# Patient Record
Sex: FEMALE | Race: BLACK OR AFRICAN AMERICAN | Employment: PART TIME | URBAN - METROPOLITAN AREA
[De-identification: names, ages, dates, MRNs, and addresses within clinical notes are randomized per-mention and may not be internally consistent; named-entity substitution may affect disease eponyms.]

---

## 2017-04-03 RX ORDER — METOPROLOL TARTRATE 50 MG/1
50 TABLET, FILM COATED ORAL 2 TIMES DAILY
COMMUNITY

## 2017-04-03 RX ORDER — TRAMADOL HYDROCHLORIDE 50 MG/1
50 TABLET ORAL EVERY 6 HOURS PRN
COMMUNITY
End: 2017-04-06

## 2017-04-06 ENCOUNTER — HOSPITAL ENCOUNTER (OUTPATIENT)
Dept: RADIATION ONCOLOGY | Age: 62
Discharge: HOME | End: 2017-04-06
Payer: COMMERCIAL

## 2017-04-06 VITALS
RESPIRATION RATE: 16 BRPM | WEIGHT: 229 LBS | HEART RATE: 74 BPM | TEMPERATURE: 97.1 F | SYSTOLIC BLOOD PRESSURE: 135 MMHG | BODY MASS INDEX: 38.15 KG/M2 | OXYGEN SATURATION: 98 % | DIASTOLIC BLOOD PRESSURE: 83 MMHG | HEIGHT: 65 IN

## 2017-04-06 PROCEDURE — 99212 OFFICE O/P EST SF 10 MIN: CPT | Performed by: RADIOLOGY

## 2018-04-26 ENCOUNTER — HOSPITAL ENCOUNTER (OUTPATIENT)
Dept: RADIATION ONCOLOGY | Age: 63
Discharge: HOME | End: 2018-04-26
Payer: COMMERCIAL

## 2018-04-26 VITALS
HEIGHT: 65 IN | DIASTOLIC BLOOD PRESSURE: 88 MMHG | SYSTOLIC BLOOD PRESSURE: 145 MMHG | WEIGHT: 233 LBS | TEMPERATURE: 97.9 F | HEART RATE: 80 BPM | OXYGEN SATURATION: 97 % | RESPIRATION RATE: 16 BRPM | BODY MASS INDEX: 38.82 KG/M2

## 2018-04-26 PROCEDURE — 99212 OFFICE O/P EST SF 10 MIN: CPT | Performed by: RADIOLOGY

## 2023-10-18 PROBLEM — M19.049 CMC ARTHRITIS: Status: ACTIVE | Noted: 2023-10-18

## 2023-10-18 RX ORDER — ONDANSETRON 4 MG/1
1 TABLET, ORALLY DISINTEGRATING ORAL EVERY 8 HOURS PRN
COMMUNITY
Start: 2021-11-21 | End: 2023-10-30 | Stop reason: ALTCHOICE

## 2023-10-18 RX ORDER — OXYCODONE AND ACETAMINOPHEN 5; 325 MG/1; MG/1
1 TABLET ORAL EVERY 6 HOURS PRN
COMMUNITY
Start: 2021-11-21 | End: 2023-10-24 | Stop reason: ALTCHOICE

## 2023-10-23 PROBLEM — E78.5 HYPERLIPIDEMIA: Status: ACTIVE | Noted: 2023-10-23

## 2023-10-23 PROBLEM — G47.00 INSOMNIA: Status: ACTIVE | Noted: 2023-10-23

## 2023-10-23 PROBLEM — I10 ESSENTIAL HYPERTENSION: Status: ACTIVE | Noted: 2023-10-23

## 2023-10-23 PROBLEM — F43.21 ADJUSTMENT DISORDER WITH DEPRESSED MOOD: Status: ACTIVE | Noted: 2023-10-23

## 2023-10-23 RX ORDER — EZETIMIBE 10 MG/1
10 TABLET ORAL DAILY
COMMUNITY
Start: 2023-08-06

## 2023-10-23 RX ORDER — FLUTICASONE PROPIONATE 50 MCG
SPRAY, SUSPENSION (ML) NASAL
COMMUNITY
Start: 2023-10-03

## 2023-10-23 RX ORDER — METFORMIN HYDROCHLORIDE 500 MG/1
500 TABLET ORAL
COMMUNITY
Start: 2023-10-03

## 2023-10-23 RX ORDER — DULOXETIN HYDROCHLORIDE 60 MG/1
60 CAPSULE, DELAYED RELEASE ORAL DAILY
COMMUNITY
Start: 2023-10-03

## 2023-10-23 RX ORDER — TRAZODONE HYDROCHLORIDE 50 MG/1
TABLET ORAL
COMMUNITY
Start: 2023-10-03

## 2023-10-23 RX ORDER — METOPROLOL TARTRATE 50 MG/1
50 TABLET ORAL
COMMUNITY
Start: 2023-10-03

## 2023-10-23 RX ORDER — NICOTINE 11MG/24HR
PATCH, TRANSDERMAL 24 HOURS TRANSDERMAL
COMMUNITY
Start: 2023-10-03

## 2023-10-24 ENCOUNTER — OFFICE VISIT (OUTPATIENT)
Dept: GASTROENTEROLOGY | Facility: CLINIC | Age: 68
End: 2023-10-24
Payer: MEDICARE

## 2023-10-24 VITALS
OXYGEN SATURATION: 95 % | BODY MASS INDEX: 40.45 KG/M2 | HEART RATE: 68 BPM | DIASTOLIC BLOOD PRESSURE: 86 MMHG | HEIGHT: 65 IN | SYSTOLIC BLOOD PRESSURE: 135 MMHG | RESPIRATION RATE: 18 BRPM | WEIGHT: 242.8 LBS

## 2023-10-24 DIAGNOSIS — Z12.11 COLON CANCER SCREENING: Primary | ICD-10-CM

## 2023-10-24 PROCEDURE — 3075F SYST BP GE 130 - 139MM HG: CPT | Performed by: NURSE PRACTITIONER

## 2023-10-24 PROCEDURE — 99202 OFFICE O/P NEW SF 15 MIN: CPT | Performed by: NURSE PRACTITIONER

## 2023-10-24 PROCEDURE — 1159F MED LIST DOCD IN RCRD: CPT | Performed by: NURSE PRACTITIONER

## 2023-10-24 PROCEDURE — 1160F RVW MEDS BY RX/DR IN RCRD: CPT | Performed by: NURSE PRACTITIONER

## 2023-10-24 PROCEDURE — 1036F TOBACCO NON-USER: CPT | Performed by: NURSE PRACTITIONER

## 2023-10-24 PROCEDURE — 3079F DIAST BP 80-89 MM HG: CPT | Performed by: NURSE PRACTITIONER

## 2023-10-24 NOTE — ASSESSMENT & PLAN NOTE
-Colonoscopy for CRC screening.  The procedure was discussed at length, including all possible risks.  Importance of the bowel prep was stressed to the patient for optimal results.  Sent to [facility] from [facility] to schedule a colonoscopy.  No red flag symptoms noted.  No increased risk factors.

## 2023-10-24 NOTE — PROGRESS NOTES
Gastroenterology Office Visit     History of Present Illness:   Myrna Calhoun is a 68 y.o. female who presents to GI clinic for colon cancer screening.  Patient presents to the GI clinic to schedule a screening colonoscopy.  No previous endoscopic investigations.  She denies any family history of colon cancer or IBD.  She denies bowel habit changes or change in stool caliber.  She has no complaints of constipation or diarrhea.  No melena or hematochezia.  Weight and appetite are stable.  She is a non-smoker and no NSAID use.  Occasional alcohol.  She denies GERD or dysphagia.  She denies any cardiac or pulmonary history.  She does not take any blood thinners or antiplatelets.    Last colonoscopy: N/A  Last endosopy:N/A    No history of abdominal surgeries    Review of Systems  Review of Systems   All other systems reviewed and are negative.      Past Medical History   has a past medical history of Breast cancer (CMS/HCC) (2019), Diabetes (CMS/[facility]), and Hypertension.     Past Surgical History  Past Surgical History:   Procedure Laterality Date    TOTAL KNEE ARTHROPLASTY Bilateral        Social History   reports that she has never smoked. She has never used smokeless tobacco. She reports that she does not currently use alcohol. She reports that she does not use drugs.     Family History  family history includes No Known Problems in her mother.   No family history of stomach or colon cancer    Allergies  Allergies   Allergen Reactions    Simvastatin Cough       Medications  Current Outpatient Medications   Medication Instructions    DULoxetine (CYMBALTA) 60 mg, oral, Daily    ezetimibe (ZETIA) 10 mg, oral, Daily    fluticasone (Flonase) 50 mcg/actuation nasal spray SPRAY 1 - 2 SPRAY BY INTRANASAL ROUTE EVERY DAY IN EACH NOSTRIL AS NEEDED    metFORMIN (GLUCOPHAGE) 500 mg, oral, 2 times daily with meals    metoprolol tartrate (LOPRESSOR) 50 mg, oral, 2 times daily with meals    ondansetron ODT (Zofran-ODT) 4 mg  disintegrating tablet 1 tablet, oral, Every 8 hours PRN    traZODone (Desyrel) 50 mg tablet TAKE 1-2 TABLETS BY ORAL ROUTE EVERY EVENING AS NEEDED    Vitamin D3 50 mcg (2,000 unit) capsule TAKE 1 CAPSULE BY ORAL ROUTE EVERY DAY WITH A MEAL (ABSORBED WITH FAT)        Objective   Visit Vitals  /86 (BP Location: Left arm, Patient Position: Sitting, BP Cuff Size: Large adult)   Pulse 68   Resp 18        Physical Exam  Vitals reviewed.   Constitutional:       General: She is awake. She is not in acute distress.     Appearance: Normal appearance. She is obese. She is not ill-appearing, toxic-appearing or diaphoretic.   HENT:      Head: Normocephalic and atraumatic.   Eyes:      Extraocular Movements: Extraocular movements intact.      Pupils: Pupils are equal, round, and reactive to light.   Cardiovascular:      Rate and Rhythm: Normal rate and regular rhythm.      Heart sounds: Normal heart sounds. No murmur heard.  Pulmonary:      Effort: Pulmonary effort is normal. No respiratory distress.      Breath sounds: Normal breath sounds.   Abdominal:      General: Abdomen is protuberant. Bowel sounds are normal.      Palpations: Abdomen is soft. There is no hepatomegaly.      Tenderness: There is no abdominal tenderness.   Musculoskeletal:         General: Normal range of motion.      Cervical back: Normal range of motion.      Right lower leg: No edema.      Left lower leg: No edema.   Skin:     General: Skin is warm and dry.      Coloration: Skin is not jaundiced or pale.   Neurological:      General: No focal deficit present.      Mental Status: She is alert and oriented to person, place, and time.      Motor: No weakness.      Gait: Gait normal.   Psychiatric:         Mood and Affect: Mood normal.         Behavior: Behavior normal. Behavior is cooperative.         Thought Content: Thought content normal.         Judgment: Judgment normal.       Assessment/Plan   Myrna Calhoun is a 68 y.o. female who presents to  GI clinic for    Colon cancer screening  -Colonoscopy for CRC screening.  The procedure was discussed at length, including all possible risks.  Importance of the bowel prep was stressed to the patient for optimal results.  Sent to [facility] from [facility] to schedule a colonoscopy.  No red flag symptoms noted.  No increased risk factors.       Anali Edouard, APRN-CNP

## 2023-10-30 ENCOUNTER — ANESTHESIA EVENT (OUTPATIENT)
Dept: GASTROENTEROLOGY | Facility: EXTERNAL LOCATION | Age: 68
End: 2023-10-30

## 2023-10-30 ENCOUNTER — HOSPITAL ENCOUNTER (OUTPATIENT)
Dept: GASTROENTEROLOGY | Facility: EXTERNAL LOCATION | Age: 68
Discharge: HOME | End: 2023-10-30
Payer: MEDICARE

## 2023-10-30 ENCOUNTER — ANESTHESIA (OUTPATIENT)
Dept: GASTROENTEROLOGY | Facility: EXTERNAL LOCATION | Age: 68
End: 2023-10-30

## 2023-10-30 VITALS
DIASTOLIC BLOOD PRESSURE: 89 MMHG | SYSTOLIC BLOOD PRESSURE: 134 MMHG | HEIGHT: 65 IN | HEART RATE: 90 BPM | WEIGHT: 242 LBS | BODY MASS INDEX: 40.32 KG/M2 | RESPIRATION RATE: 18 BRPM | TEMPERATURE: 97.3 F | OXYGEN SATURATION: 97 %

## 2023-10-30 DIAGNOSIS — Z12.11 COLON CANCER SCREENING: ICD-10-CM

## 2023-10-30 PROCEDURE — 88305 TISSUE EXAM BY PATHOLOGIST: CPT | Performed by: PATHOLOGY

## 2023-10-30 PROCEDURE — 88305 TISSUE EXAM BY PATHOLOGIST: CPT | Mod: TC | Performed by: STUDENT IN AN ORGANIZED HEALTH CARE EDUCATION/TRAINING PROGRAM

## 2023-10-30 PROCEDURE — 45385 COLONOSCOPY W/LESION REMOVAL: CPT | Performed by: STUDENT IN AN ORGANIZED HEALTH CARE EDUCATION/TRAINING PROGRAM

## 2023-10-30 PROCEDURE — 88305 TISSUE EXAM BY PATHOLOGIST: CPT | Mod: TC,SUR | Performed by: STUDENT IN AN ORGANIZED HEALTH CARE EDUCATION/TRAINING PROGRAM

## 2023-10-30 RX ORDER — LIDOCAINE HYDROCHLORIDE 20 MG/ML
INJECTION, SOLUTION INFILTRATION; PERINEURAL AS NEEDED
Status: DISCONTINUED | OUTPATIENT
Start: 2023-10-30 | End: 2023-10-30

## 2023-10-30 RX ORDER — PROPOFOL 10 MG/ML
INJECTION, EMULSION INTRAVENOUS AS NEEDED
Status: DISCONTINUED | OUTPATIENT
Start: 2023-10-30 | End: 2023-10-30

## 2023-10-30 RX ORDER — SODIUM CHLORIDE 9 MG/ML
20 INJECTION, SOLUTION INTRAVENOUS CONTINUOUS
Status: DISCONTINUED | OUTPATIENT
Start: 2023-10-30 | End: 2023-10-31 | Stop reason: HOSPADM

## 2023-10-30 RX ADMIN — PROPOFOL 70 MG: 10 INJECTION, EMULSION INTRAVENOUS at 11:42

## 2023-10-30 RX ADMIN — PROPOFOL 30 MG: 10 INJECTION, EMULSION INTRAVENOUS at 11:55

## 2023-10-30 RX ADMIN — PROPOFOL 30 MG: 10 INJECTION, EMULSION INTRAVENOUS at 11:58

## 2023-10-30 RX ADMIN — PROPOFOL 30 MG: 10 INJECTION, EMULSION INTRAVENOUS at 11:45

## 2023-10-30 RX ADMIN — LIDOCAINE HYDROCHLORIDE 3 ML: 20 INJECTION, SOLUTION INFILTRATION; PERINEURAL at 11:42

## 2023-10-30 RX ADMIN — PROPOFOL 30 MG: 10 INJECTION, EMULSION INTRAVENOUS at 11:49

## 2023-10-30 RX ADMIN — SODIUM CHLORIDE: 9 INJECTION, SOLUTION INTRAVENOUS at 11:38

## 2023-10-30 SDOH — HEALTH STABILITY: MENTAL HEALTH: CURRENT SMOKER: 0

## 2023-10-30 ASSESSMENT — PAIN SCALES - GENERAL
PAINLEVEL_OUTOF10: 0 - NO PAIN
PAIN_LEVEL: 0
PAINLEVEL_OUTOF10: 0 - NO PAIN

## 2023-10-30 ASSESSMENT — PAIN - FUNCTIONAL ASSESSMENT
PAIN_FUNCTIONAL_ASSESSMENT: 0-10

## 2023-10-30 ASSESSMENT — COLUMBIA-SUICIDE SEVERITY RATING SCALE - C-SSRS
1. IN THE PAST MONTH, HAVE YOU WISHED YOU WERE DEAD OR WISHED YOU COULD GO TO SLEEP AND NOT WAKE UP?: NO
6. HAVE YOU EVER DONE ANYTHING, STARTED TO DO ANYTHING, OR PREPARED TO DO ANYTHING TO END YOUR LIFE?: NO
2. HAVE YOU ACTUALLY HAD ANY THOUGHTS OF KILLING YOURSELF?: NO

## 2023-10-30 NOTE — ANESTHESIA PREPROCEDURE EVALUATION
Patient: Myrna Calhoun    Procedure Information       Date/Time: 10/30/23 1000    Scheduled providers: Oneal Valles DO    Procedure: COLONOSCOPY    Location: [facility]            Relevant Problems   Cardiovascular   (+) Essential hypertension   (+) Hyperlipidemia       Clinical information reviewed:   Tobacco  Allergies  Meds  Problems  Med Hx  Surg Hx   Fam Hx  Soc   Hx        NPO Detail:  NPO/Void Status  Date of Last Liquid: 10/30/23  Time of Last Liquid: 0400  Date of Last Solid: 10/28/23  Time of Last Solid: 1600  Last Intake Type: Clear fluids         Physical Exam    Airway  Mallampati: III     Cardiovascular - normal exam  Rhythm: regular  Rate: normal     Dental - normal exam     Pulmonary - normal exam  Breath sounds clear to auscultation     Abdominal   (+) obese  Abdomen: soft         Anesthesia Plan    ASA 3     MAC     The patient is not a current smoker.    intravenous induction   Anesthetic plan and risks discussed with patient.  Use of blood products discussed with who consented to blood products.    Plan discussed with CRNA.

## 2023-10-30 NOTE — ANESTHESIA POSTPROCEDURE EVALUATION
Patient: Myrna Calhoun    Procedure Summary       Date: 10/30/23 Room / Location: [facility]    Anesthesia Start: 1138 Anesthesia Stop: 1205    Procedure: COLONOSCOPY Diagnosis: Colon cancer screening    Scheduled Providers: Oneal Valles DO Responsible Provider: TATIANA Raza    Anesthesia Type: MAC ASA Status: 3            Anesthesia Type: MAC    Vitals Value Taken Time   /75 `   ` 36.3 10/30/23 1205   Pulse 94 10/30/23 1205   Resp 14 10/30/23 1205   SpO2 96 10/30/23 1205       Anesthesia Post Evaluation    Patient location during evaluation: bedside  Patient participation: complete - patient participated  Level of consciousness: awake  Pain score: 0  Pain management: adequate  Airway patency: patent  Cardiovascular status: acceptable  Respiratory status: acceptable  Hydration status: acceptable      There were no known notable events for this encounter.

## 2023-10-30 NOTE — SIGNIFICANT EVENT
Physician at bedside to discuss procedure results with patient  Family updated   Tolerating PO fluids

## 2023-10-30 NOTE — DISCHARGE INSTRUCTIONS
The anesthetics, sedatives and pain killers which were given to you will be acting in your body for the next 24 hours. This may cause you to feel sleepy. This feeling will slowly wear off. For the next 24 hours. Resume to normal activities tomorrow  FOR THE REST OF TODAY, DO NOT:  Drive a car  Operate machinery or power tools  Drink any form of alcohol, beer or wine.  Do not take any over the counter medications with alcohol in them or that can make you groggy  Make any important decisions or sign and legal documents.    **You may eat anything as long as your physician has not warned you to stay away from certain foods. However, it is better to start with liquids,  then progress to softer foods, and gradually work up to solid foods.    **We strongly suggest that a responsible adult be with you for the rest of the day and also the night. This is for your protection and safety since you may not be as alert as usual. You should be especially careful climbing stairs.     **If you experience bleeding in your spit or stool, fever and chills, shortness of breath, chest pain, or extreme abdominal pain, uncontrollable nausea and vomiting go to the nearest Emergency Room.    **Resume home medications unless otherwise instructed      [facility] Phone Number [phone]   [facility] [phone] If you need to speak with a doctor or it's after 5pm on a weekday or all weekend

## 2023-10-30 NOTE — H&P
Outpatient Hospital Procedure H&P    Patient Profile-Procedures  Name Myrna Calhoun  Date of Birth 1955  MRN [id number]  Address   [address] [id number] [address] [address]    Primary Phone Number [phone]  Secondary Phone Number    Vaolrie Valentino    Procedure(s):  Colonoscopy  Primary contact name and number   Extended Emergency Contact Information  Primary Emergency Contact: January Gray  Home Phone: [phone]  Relation: Sibling    General Health  Weight   Vitals:    10/30/23 1103   Weight: 110 kg (242 lb)     BMI Body mass index is 40.27 kg/m².    Allergies  Allergies   Allergen Reactions    Simvastatin Cough       Past Medical History   Past Medical History:   Diagnosis Date    Breast cancer (CMS/[facility]) 2019    Diabetes (CMS/[facility])     Hyperlipidemia     Hypertension        Provider assessment  Diagnosis: Colon cancer screening  Medication Reviewed - yes  Prior to Admission medications    Medication Sig Start Date End Date Taking? Authorizing Provider   DULoxetine (Cymbalta) 60 mg DR capsule Take 1 capsule (60 mg) by mouth once daily. 10/3/23   Historical Provider, MD   ezetimibe (Zetia) 10 mg tablet Take 1 tablet (10 mg) by mouth once daily. 8/6/23   Historical Provider, MD   fluticasone (Flonase) 50 mcg/actuation nasal spray SPRAY 1 - 2 SPRAY BY INTRANASAL ROUTE EVERY DAY IN EACH NOSTRIL AS NEEDED 10/3/23   Historical Provider, MD   metFORMIN (Glucophage) 500 mg tablet Take 1 tablet (500 mg) by mouth 2 times a day with meals. 10/3/23   Historical Provider, MD   metoprolol tartrate (Lopressor) 50 mg tablet Take 1 tablet by mouth 2 times a day with meals. 10/3/23   Historical Provider, MD   ondansetron ODT (Zofran-ODT) 4 mg disintegrating tablet Take 1 tablet (4 mg) by mouth every 8 hours if needed for nausea or vomiting. 11/21/21   Historical Provider, MD   traZODone (Desyrel) 50 mg tablet TAKE 1-2 TABLETS BY ORAL ROUTE EVERY EVENING AS NEEDED 10/3/23   Historical  Provider, MD   Vitamin D3 50 mcg (2,000 unit) capsule TAKE 1 CAPSULE BY ORAL ROUTE EVERY DAY WITH A MEAL (ABSORBED WITH FAT) 10/3/23   Historical Provider, MD   oxyCODONE-acetaminophen (Percocet) 5-325 mg tablet Take 1 tablet by mouth every 6 hours if needed for severe pain (7 - 10). 11/21/21 10/24/23  Historical Provider, MD       Physical Exam  Vitals:    10/30/23 1108   BP: (!) 169/96   Pulse: 90   Resp: 20   Temp:    SpO2: 96%       General: A&Ox3, NAD.  HEENT: AT/NC.   CV: RRR. No murmur.  Resp: CTA bilaterally. No wheezing, rhonchi or rales.   GI: Soft, NT/ND. BSx4.  Extrem: No edema. Pulses intact.  Skin: No Jaundice.   Neuro: No focal deficits.   Psych: Normal mood and affect.        Oropharyngeal Classification III (soft and hard palate and base of uvula visible)  ASA PS Classification 3  Sedation Plan: Deep Sedation.  Procedure Plan - pre-procedural (re)assesment completed by physician:  discharge/transfer patient when discharge criteria met    Oneal Valles DO  10/30/2023 11:38 AM

## 2023-11-10 LAB
LABORATORY COMMENT REPORT: NORMAL
PATH REPORT.FINAL DX SPEC: NORMAL
PATH REPORT.GROSS SPEC: NORMAL
PATH REPORT.TOTAL CANCER: NORMAL

## 2024-02-05 ENCOUNTER — HOSPITAL ENCOUNTER (EMERGENCY)
Facility: HOSPITAL | Age: 69
Discharge: HOME | End: 2024-02-06
Attending: STUDENT IN AN ORGANIZED HEALTH CARE EDUCATION/TRAINING PROGRAM
Payer: MEDICARE

## 2024-02-05 DIAGNOSIS — R10.2 SUPRAPUBIC ABDOMINAL PAIN: Primary | ICD-10-CM

## 2024-02-05 DIAGNOSIS — R30.0 DYSURIA: ICD-10-CM

## 2024-02-05 LAB
ALBUMIN SERPL BCP-MCNC: 4.2 G/DL (ref 3.4–5)
ALP SERPL-CCNC: 55 U/L (ref 33–136)
ALT SERPL W P-5'-P-CCNC: 17 U/L (ref 7–45)
ANION GAP SERPL CALC-SCNC: 11 MMOL/L (ref 10–20)
AST SERPL W P-5'-P-CCNC: 18 U/L (ref 9–39)
BASOPHILS # BLD AUTO: 0.02 X10*3/UL (ref 0–0.1)
BASOPHILS NFR BLD AUTO: 0.4 %
BILIRUB SERPL-MCNC: 0.5 MG/DL (ref 0–1.2)
BUN SERPL-MCNC: 24 MG/DL (ref 6–23)
CALCIUM SERPL-MCNC: 9.5 MG/DL (ref 8.6–10.3)
CARDIAC TROPONIN I PNL SERPL HS: 7 NG/L (ref 0–13)
CHLORIDE SERPL-SCNC: 103 MMOL/L (ref 98–107)
CO2 SERPL-SCNC: 27 MMOL/L (ref 21–32)
CREAT SERPL-MCNC: 1.33 MG/DL (ref 0.5–1.05)
EGFRCR SERPLBLD CKD-EPI 2021: 44 ML/MIN/1.73M*2
EOSINOPHIL # BLD AUTO: 0.06 X10*3/UL (ref 0–0.7)
EOSINOPHIL NFR BLD AUTO: 1.1 %
ERYTHROCYTE [DISTWIDTH] IN BLOOD BY AUTOMATED COUNT: 13.4 % (ref 11.5–14.5)
GLUCOSE SERPL-MCNC: 95 MG/DL (ref 74–99)
HCT VFR BLD AUTO: 43.2 % (ref 36–46)
HGB BLD-MCNC: 14.3 G/DL (ref 12–16)
IMM GRANULOCYTES # BLD AUTO: 0.01 X10*3/UL (ref 0–0.7)
IMM GRANULOCYTES NFR BLD AUTO: 0.2 % (ref 0–0.9)
LACTATE SERPL-SCNC: 2.5 MMOL/L (ref 0.4–2)
LIPASE SERPL-CCNC: 27 U/L (ref 9–82)
LYMPHOCYTES # BLD AUTO: 2.48 X10*3/UL (ref 1.2–4.8)
LYMPHOCYTES NFR BLD AUTO: 46.4 %
MCH RBC QN AUTO: 32.3 PG (ref 26–34)
MCHC RBC AUTO-ENTMCNC: 33.1 G/DL (ref 32–36)
MCV RBC AUTO: 98 FL (ref 80–100)
MONOCYTES # BLD AUTO: 0.57 X10*3/UL (ref 0.1–1)
MONOCYTES NFR BLD AUTO: 10.7 %
NEUTROPHILS # BLD AUTO: 2.21 X10*3/UL (ref 1.2–7.7)
NEUTROPHILS NFR BLD AUTO: 41.2 %
NRBC BLD-RTO: 0 /100 WBCS (ref 0–0)
PLATELET # BLD AUTO: 342 X10*3/UL (ref 150–450)
POTASSIUM SERPL-SCNC: 4.1 MMOL/L (ref 3.5–5.3)
PROT SERPL-MCNC: 7.5 G/DL (ref 6.4–8.2)
RBC # BLD AUTO: 4.43 X10*6/UL (ref 4–5.2)
SODIUM SERPL-SCNC: 137 MMOL/L (ref 136–145)
WBC # BLD AUTO: 5.4 X10*3/UL (ref 4.4–11.3)

## 2024-02-05 PROCEDURE — 80053 COMPREHEN METABOLIC PANEL: CPT | Performed by: STUDENT IN AN ORGANIZED HEALTH CARE EDUCATION/TRAINING PROGRAM

## 2024-02-05 PROCEDURE — 99284 EMERGENCY DEPT VISIT MOD MDM: CPT | Mod: 25 | Performed by: STUDENT IN AN ORGANIZED HEALTH CARE EDUCATION/TRAINING PROGRAM

## 2024-02-05 PROCEDURE — 2500000004 HC RX 250 GENERAL PHARMACY W/ HCPCS (ALT 636 FOR OP/ED): Performed by: STUDENT IN AN ORGANIZED HEALTH CARE EDUCATION/TRAINING PROGRAM

## 2024-02-05 PROCEDURE — 96375 TX/PRO/DX INJ NEW DRUG ADDON: CPT

## 2024-02-05 PROCEDURE — 96361 HYDRATE IV INFUSION ADD-ON: CPT

## 2024-02-05 PROCEDURE — 99285 EMERGENCY DEPT VISIT HI MDM: CPT | Mod: 25

## 2024-02-05 PROCEDURE — 96374 THER/PROPH/DIAG INJ IV PUSH: CPT

## 2024-02-05 PROCEDURE — 36415 COLL VENOUS BLD VENIPUNCTURE: CPT | Performed by: STUDENT IN AN ORGANIZED HEALTH CARE EDUCATION/TRAINING PROGRAM

## 2024-02-05 PROCEDURE — 83690 ASSAY OF LIPASE: CPT | Performed by: STUDENT IN AN ORGANIZED HEALTH CARE EDUCATION/TRAINING PROGRAM

## 2024-02-05 PROCEDURE — 85025 COMPLETE CBC W/AUTO DIFF WBC: CPT | Performed by: STUDENT IN AN ORGANIZED HEALTH CARE EDUCATION/TRAINING PROGRAM

## 2024-02-05 PROCEDURE — 84484 ASSAY OF TROPONIN QUANT: CPT | Performed by: STUDENT IN AN ORGANIZED HEALTH CARE EDUCATION/TRAINING PROGRAM

## 2024-02-05 PROCEDURE — 83605 ASSAY OF LACTIC ACID: CPT | Performed by: STUDENT IN AN ORGANIZED HEALTH CARE EDUCATION/TRAINING PROGRAM

## 2024-02-05 RX ORDER — ONDANSETRON HYDROCHLORIDE 2 MG/ML
4 INJECTION, SOLUTION INTRAVENOUS ONCE
Status: COMPLETED | OUTPATIENT
Start: 2024-02-05 | End: 2024-02-05

## 2024-02-05 RX ORDER — MORPHINE SULFATE 4 MG/ML
4 INJECTION, SOLUTION INTRAMUSCULAR; INTRAVENOUS ONCE
Status: COMPLETED | OUTPATIENT
Start: 2024-02-05 | End: 2024-02-05

## 2024-02-05 RX ADMIN — ONDANSETRON 4 MG: 2 INJECTION INTRAMUSCULAR; INTRAVENOUS at 23:29

## 2024-02-05 RX ADMIN — MORPHINE SULFATE 4 MG: 4 INJECTION, SOLUTION INTRAMUSCULAR; INTRAVENOUS at 23:29

## 2024-02-05 ASSESSMENT — LIFESTYLE VARIABLES
HAVE YOU EVER FELT YOU SHOULD CUT DOWN ON YOUR DRINKING: NO
HAVE PEOPLE ANNOYED YOU BY CRITICIZING YOUR DRINKING: NO
EVER HAD A DRINK FIRST THING IN THE MORNING TO STEADY YOUR NERVES TO GET RID OF A HANGOVER: NO
EVER FELT BAD OR GUILTY ABOUT YOUR DRINKING: NO

## 2024-02-05 ASSESSMENT — PAIN - FUNCTIONAL ASSESSMENT: PAIN_FUNCTIONAL_ASSESSMENT: 0-10

## 2024-02-05 ASSESSMENT — PAIN SCALES - GENERAL: PAINLEVEL_OUTOF10: 10 - WORST POSSIBLE PAIN

## 2024-02-06 ENCOUNTER — APPOINTMENT (OUTPATIENT)
Dept: CARDIOLOGY | Facility: HOSPITAL | Age: 69
End: 2024-02-06
Payer: MEDICARE

## 2024-02-06 ENCOUNTER — APPOINTMENT (OUTPATIENT)
Dept: RADIOLOGY | Facility: HOSPITAL | Age: 69
End: 2024-02-06
Payer: MEDICARE

## 2024-02-06 VITALS
WEIGHT: 235 LBS | SYSTOLIC BLOOD PRESSURE: 154 MMHG | TEMPERATURE: 96.8 F | BODY MASS INDEX: 39.15 KG/M2 | DIASTOLIC BLOOD PRESSURE: 83 MMHG | OXYGEN SATURATION: 96 % | RESPIRATION RATE: 18 BRPM | HEART RATE: 70 BPM | HEIGHT: 65 IN

## 2024-02-06 LAB
ANION GAP SERPL CALC-SCNC: 11 MMOL/L (ref 10–20)
APPEARANCE UR: ABNORMAL
BACTERIA #/AREA URNS AUTO: ABNORMAL /HPF
BILIRUB UR STRIP.AUTO-MCNC: NEGATIVE MG/DL
BUN SERPL-MCNC: 23 MG/DL (ref 6–23)
CALCIUM SERPL-MCNC: 8.9 MG/DL (ref 8.6–10.3)
CARDIAC TROPONIN I PNL SERPL HS: 7 NG/L (ref 0–13)
CHLORIDE SERPL-SCNC: 105 MMOL/L (ref 98–107)
CO2 SERPL-SCNC: 25 MMOL/L (ref 21–32)
COLOR UR: YELLOW
CREAT SERPL-MCNC: 1.27 MG/DL (ref 0.5–1.05)
EGFRCR SERPLBLD CKD-EPI 2021: 46 ML/MIN/1.73M*2
GLUCOSE SERPL-MCNC: 104 MG/DL (ref 74–99)
GLUCOSE UR STRIP.AUTO-MCNC: NEGATIVE MG/DL
HYALINE CASTS #/AREA URNS AUTO: ABNORMAL /LPF
KETONES UR STRIP.AUTO-MCNC: ABNORMAL MG/DL
LACTATE SERPL-SCNC: 1.8 MMOL/L (ref 0.4–2)
LEUKOCYTE ESTERASE UR QL STRIP.AUTO: ABNORMAL
MAGNESIUM SERPL-MCNC: 2 MG/DL (ref 1.6–2.4)
MUCOUS THREADS #/AREA URNS AUTO: ABNORMAL /LPF
NITRITE UR QL STRIP.AUTO: NEGATIVE
PH UR STRIP.AUTO: 5 [PH]
POTASSIUM SERPL-SCNC: 4 MMOL/L (ref 3.5–5.3)
PROT UR STRIP.AUTO-MCNC: ABNORMAL MG/DL
RBC # UR STRIP.AUTO: NEGATIVE /UL
RBC #/AREA URNS AUTO: ABNORMAL /HPF
SODIUM SERPL-SCNC: 137 MMOL/L (ref 136–145)
SP GR UR STRIP.AUTO: 1.03
SQUAMOUS #/AREA URNS AUTO: ABNORMAL /HPF
UROBILINOGEN UR STRIP.AUTO-MCNC: <2 MG/DL
WBC #/AREA URNS AUTO: ABNORMAL /HPF

## 2024-02-06 PROCEDURE — 2500000004 HC RX 250 GENERAL PHARMACY W/ HCPCS (ALT 636 FOR OP/ED): Performed by: STUDENT IN AN ORGANIZED HEALTH CARE EDUCATION/TRAINING PROGRAM

## 2024-02-06 PROCEDURE — 74176 CT ABD & PELVIS W/O CONTRAST: CPT | Performed by: RADIOLOGY

## 2024-02-06 PROCEDURE — 81001 URINALYSIS AUTO W/SCOPE: CPT | Performed by: STUDENT IN AN ORGANIZED HEALTH CARE EDUCATION/TRAINING PROGRAM

## 2024-02-06 PROCEDURE — 80048 BASIC METABOLIC PNL TOTAL CA: CPT | Performed by: STUDENT IN AN ORGANIZED HEALTH CARE EDUCATION/TRAINING PROGRAM

## 2024-02-06 PROCEDURE — 36415 COLL VENOUS BLD VENIPUNCTURE: CPT | Performed by: STUDENT IN AN ORGANIZED HEALTH CARE EDUCATION/TRAINING PROGRAM

## 2024-02-06 PROCEDURE — 83735 ASSAY OF MAGNESIUM: CPT | Performed by: STUDENT IN AN ORGANIZED HEALTH CARE EDUCATION/TRAINING PROGRAM

## 2024-02-06 PROCEDURE — 74176 CT ABD & PELVIS W/O CONTRAST: CPT

## 2024-02-06 PROCEDURE — 93005 ELECTROCARDIOGRAM TRACING: CPT

## 2024-02-06 PROCEDURE — 84484 ASSAY OF TROPONIN QUANT: CPT | Performed by: STUDENT IN AN ORGANIZED HEALTH CARE EDUCATION/TRAINING PROGRAM

## 2024-02-06 PROCEDURE — 83605 ASSAY OF LACTIC ACID: CPT | Performed by: STUDENT IN AN ORGANIZED HEALTH CARE EDUCATION/TRAINING PROGRAM

## 2024-02-06 RX ORDER — OXYCODONE AND ACETAMINOPHEN 5; 325 MG/1; MG/1
1 TABLET ORAL EVERY 6 HOURS PRN
Qty: 12 TABLET | Refills: 0 | Status: SHIPPED | OUTPATIENT
Start: 2024-02-06 | End: 2024-02-09

## 2024-02-06 RX ORDER — CEPHALEXIN 500 MG/1
500 CAPSULE ORAL 2 TIMES DAILY
Qty: 14 CAPSULE | Refills: 0 | Status: SHIPPED | OUTPATIENT
Start: 2024-02-06 | End: 2024-02-13

## 2024-02-06 RX ADMIN — SODIUM CHLORIDE, POTASSIUM CHLORIDE, SODIUM LACTATE AND CALCIUM CHLORIDE 1000 ML: 600; 310; 30; 20 INJECTION, SOLUTION INTRAVENOUS at 00:07

## 2024-02-06 ASSESSMENT — PAIN - FUNCTIONAL ASSESSMENT: PAIN_FUNCTIONAL_ASSESSMENT: 0-10

## 2024-02-06 ASSESSMENT — PAIN SCALES - GENERAL: PAINLEVEL_OUTOF10: 2

## 2024-02-06 NOTE — ED PROVIDER NOTES
"HPI   Chief Complaint   Patient presents with    Abdominal Pain       Patient presents to the emergency department complaints of abdominal pain.  She states that she has had it for multiple weeks now but it worsened approximately last Monday.  She states that 24 hours ago she was pain-free but then she woke up this morning again with abdominal pain.  She states \"it is all over but it hurts mostly in the center and below \".  Her only abdominal/pelvic or surgical history is tubal ligation.  Has a known history of breast cancer in the past for which she had mastectomy and radiation.  Denies nausea or vomiting or diarrhea.  Denies dysuria or blood in the urine and stool.  No complaints of fever, chills or sweats.  Denies chest pain or shortness of breath.  No history of MI, CVA, DVT or PE.  No history of cardiac stenting.  Has not tried any medications for her symptoms stating \"I am on a few prescription medicines and I did not know if I could take anything \".                          [place] Coma Scale Score: 15                  Patient History   Past Medical History:   Diagnosis Date    Breast cancer (CMS/[facility]) 2019    Diabetes (CMS/[facility])     Hyperlipidemia     Hypertension      Past Surgical History:   Procedure Laterality Date    TOTAL KNEE ARTHROPLASTY Bilateral      Family History   Problem Relation Name Age of Onset    No Known Problems Mother       Social History     Tobacco Use    Smoking status: Never    Smokeless tobacco: Never   Vaping Use    Vaping Use: Never used   Substance Use Topics    Alcohol use: Not Currently    Drug use: Never       Physical Exam   ED Triage Vitals [02/05/24 2142]   Temperature Heart Rate Respirations BP   36 °C (96.8 °F) 79 18 (!) 142/95      Pulse Ox Temp src Heart Rate Source Patient Position   99 % -- -- --      BP Location FiO2 (%)     -- --       Physical Exam  Constitutional:       Appearance: She is normal weight.   HENT:      Head: Normocephalic and atraumatic.      Right " Ear: External ear normal.      Left Ear: External ear normal.      Nose: Nose normal.   Eyes:      Extraocular Movements: Extraocular movements intact.      Pupils: Pupils are equal, round, and reactive to light.   Cardiovascular:      Rate and Rhythm: Normal rate and regular rhythm.      Pulses: Normal pulses.   Abdominal:      Tenderness: There is abdominal tenderness in the right lower quadrant, suprapubic area and left lower quadrant. There is no right CVA tenderness, left CVA tenderness or guarding. Negative signs include Turner's sign.   Musculoskeletal:      Right lower leg: No edema.      Left lower leg: No edema.   Skin:     Capillary Refill: Capillary refill takes less than 2 seconds.   Neurological:      General: No focal deficit present.      Mental Status: She is alert and oriented to person, place, and time. Mental status is at baseline.         ED Course & MDM   Diagnoses as of 02/06/24 0238   Suprapubic abdominal pain   Dysuria       Medical Decision Making  Will obtain EKG in addition to labs, urinalysis and a CT of the abdomen pelvis.  Given her previous history of breast cancer, will evaluate for potential metastatic spread as a etiology of her chronic abdominal pain    Morphine and Zofran 4 mg IV for symptomatic control in the meantime.    Pt noted to have Cr elevation from baseline. It is trending down on rpt BMP with also resolution of lactic acid elevation. Normal gap and bicarb   Normal wbc  Trop 7 and 7    EKG interpreted by myself did show runs of trigeminy with heart rate 80, , QRS 82 QTc 495.  Otherwise, shows a sinus rhythm.  Normal axis.  No STEMI criteria noted.  Morphology is similar to previous EKG besides frequent PVCs patient is not complaining of any symptoms related to.  Irregular heartbeat or palpitations.  Added on magnesium level that was unremarkable at 2.  Potassium was 4.    Advised the patient to have her BMP rechecked within the next 24 hours to ensure that her  kidney function continues to improve.  She acknowledges understanding and feels comfortable to current plan of care for discharge tonight.    UA does show 1+ bacteria with moderate leukocyte esterase   Will start on keflex    Procedure  Procedures     Amina Peacock MD  02/06/24 5517

## 2024-02-08 LAB
ATRIAL RATE: 80 BPM
P AXIS: 55 DEGREES
P OFFSET: 181 MS
P ONSET: 125 MS
PR INTERVAL: 188 MS
Q ONSET: 219 MS
QRS COUNT: 13 BEATS
QRS DURATION: 82 MS
QT INTERVAL: 430 MS
QTC CALCULATION(BAZETT): 495 MS
QTC FREDERICIA: 473 MS
R AXIS: 4 DEGREES
T AXIS: 72 DEGREES
T OFFSET: 434 MS
VENTRICULAR RATE: 80 BPM

## 2024-06-10 ENCOUNTER — APPOINTMENT (OUTPATIENT)
Dept: CARDIOLOGY | Facility: HOSPITAL | Age: 69
End: 2024-06-10
Payer: MEDICARE

## 2024-06-10 ENCOUNTER — APPOINTMENT (OUTPATIENT)
Dept: RADIOLOGY | Facility: HOSPITAL | Age: 69
End: 2024-06-10
Payer: MEDICARE

## 2024-06-10 ENCOUNTER — HOSPITAL ENCOUNTER (INPATIENT)
Facility: HOSPITAL | Age: 69
LOS: 2 days | Discharge: HOME | End: 2024-06-13
Attending: STUDENT IN AN ORGANIZED HEALTH CARE EDUCATION/TRAINING PROGRAM | Admitting: HOSPITALIST
Payer: MEDICARE

## 2024-06-10 ENCOUNTER — HOSPITAL ENCOUNTER (EMERGENCY)
Facility: HOSPITAL | Age: 69
Discharge: STILL ADMITTED OR ADMINISTRATIVE | End: 2024-06-10
Attending: STUDENT IN AN ORGANIZED HEALTH CARE EDUCATION/TRAINING PROGRAM
Payer: MEDICARE

## 2024-06-10 VITALS
OXYGEN SATURATION: 96 % | WEIGHT: 227.07 LBS | HEART RATE: 110 BPM | BODY MASS INDEX: 34.41 KG/M2 | RESPIRATION RATE: 4 BRPM | TEMPERATURE: 97.2 F | DIASTOLIC BLOOD PRESSURE: 99 MMHG | SYSTOLIC BLOOD PRESSURE: 148 MMHG | HEIGHT: 68 IN

## 2024-06-10 DIAGNOSIS — I50.31 ACUTE DIASTOLIC CONGESTIVE HEART FAILURE (MULTI): ICD-10-CM

## 2024-06-10 DIAGNOSIS — F10.929 ALCOHOLIC INTOXICATION WITH COMPLICATION (CMS-HCC): ICD-10-CM

## 2024-06-10 DIAGNOSIS — R55 SYNCOPE AND COLLAPSE: ICD-10-CM

## 2024-06-10 DIAGNOSIS — E87.6 HYPOKALEMIA: ICD-10-CM

## 2024-06-10 DIAGNOSIS — R79.89 ELEVATED LACTIC ACID LEVEL: ICD-10-CM

## 2024-06-10 DIAGNOSIS — R41.89 UNRESPONSIVE: Primary | ICD-10-CM

## 2024-06-10 DIAGNOSIS — I49.3 PVC (PREMATURE VENTRICULAR CONTRACTION): ICD-10-CM

## 2024-06-10 LAB
ALBUMIN SERPL BCP-MCNC: 3.1 G/DL (ref 3.4–5)
ALP SERPL-CCNC: 41 U/L (ref 33–136)
ALT SERPL W P-5'-P-CCNC: 36 U/L (ref 7–45)
AMMONIA PLAS-SCNC: 36 UMOL/L (ref 16–53)
AMPHETAMINES UR QL SCN: NORMAL
ANION GAP BLDA CALCULATED.4IONS-SCNC: 15 MMO/L (ref 10–25)
ANION GAP SERPL CALC-SCNC: 12 MMOL/L (ref 10–20)
APAP SERPL-MCNC: <10 UG/ML
APPEARANCE UR: ABNORMAL
AST SERPL W P-5'-P-CCNC: 35 U/L (ref 9–39)
BARBITURATES UR QL SCN: NORMAL
BASE EXCESS BLDA CALC-SCNC: -6.4 MMOL/L (ref -2–3)
BASE EXCESS BLDA CALC-SCNC: -6.4 MMOL/L (ref -2–3)
BASOPHILS # BLD AUTO: 0.02 X10*3/UL (ref 0–0.1)
BASOPHILS NFR BLD AUTO: 0.3 %
BENZODIAZ UR QL SCN: NORMAL
BILIRUB SERPL-MCNC: 0.4 MG/DL (ref 0–1.2)
BILIRUB UR STRIP.AUTO-MCNC: NEGATIVE MG/DL
BNP SERPL-MCNC: 144 PG/ML (ref 0–99)
BODY TEMPERATURE: ABNORMAL
BODY TEMPERATURE: ABNORMAL
BUN SERPL-MCNC: 16 MG/DL (ref 6–23)
BZE UR QL SCN: NORMAL
CA-I BLDA-SCNC: 1.18 MMOL/L (ref 1.1–1.33)
CALCIUM SERPL-MCNC: 6.7 MG/DL (ref 8.6–10.3)
CANNABINOIDS UR QL SCN: NORMAL
CARDIAC TROPONIN I PNL SERPL HS: 8 NG/L (ref 0–13)
CARDIAC TROPONIN I PNL SERPL HS: 8 NG/L (ref 0–13)
CHLORIDE BLDA-SCNC: 107 MMOL/L (ref 98–107)
CHLORIDE SERPL-SCNC: 116 MMOL/L (ref 98–107)
CO2 SERPL-SCNC: 18 MMOL/L (ref 21–32)
COLOR UR: YELLOW
CREAT SERPL-MCNC: 0.72 MG/DL (ref 0.5–1.05)
CRITICAL CALL TIME: 2240
CRITICAL CALL TIME: 2240
CRITICAL CALLED BY: ABNORMAL
CRITICAL CALLED BY: ABNORMAL
CRITICAL CALLED TO: ABNORMAL
CRITICAL CALLED TO: ABNORMAL
CRITICAL READ BACK: ABNORMAL
CRITICAL READ BACK: ABNORMAL
EGFRCR SERPLBLD CKD-EPI 2021: >90 ML/MIN/1.73M*2
EOSINOPHIL # BLD AUTO: 0.03 X10*3/UL (ref 0–0.7)
EOSINOPHIL NFR BLD AUTO: 0.5 %
ERYTHROCYTE [DISTWIDTH] IN BLOOD BY AUTOMATED COUNT: 13.7 % (ref 11.5–14.5)
ETHANOL SERPL-MCNC: 167 MG/DL
FENTANYL+NORFENTANYL UR QL SCN: NORMAL
GLUCOSE BLDA-MCNC: 145 MG/DL (ref 74–99)
GLUCOSE SERPL-MCNC: 124 MG/DL (ref 74–99)
GLUCOSE UR STRIP.AUTO-MCNC: NEGATIVE MG/DL
HCO3 BLDA-SCNC: 20.6 MMOL/L (ref 22–26)
HCO3 BLDA-SCNC: 20.6 MMOL/L (ref 22–26)
HCT VFR BLD AUTO: 41.2 % (ref 36–46)
HCT VFR BLD EST: 37 % (ref 36–46)
HGB BLD-MCNC: 13.3 G/DL (ref 12–16)
HGB BLDA-MCNC: 12.3 G/DL (ref 12–16)
HYALINE CASTS #/AREA URNS AUTO: ABNORMAL /LPF
IMM GRANULOCYTES # BLD AUTO: 0.02 X10*3/UL (ref 0–0.7)
IMM GRANULOCYTES NFR BLD AUTO: 0.3 % (ref 0–0.9)
INHALED O2 CONCENTRATION: 60 %
INHALED O2 CONCENTRATION: 60 %
INR PPP: 1 (ref 0.9–1.1)
KETONES UR STRIP.AUTO-MCNC: NEGATIVE MG/DL
LACTATE BLDA-SCNC: 4.4 MMOL/L (ref 0.4–2)
LACTATE SERPL-SCNC: 4.2 MMOL/L (ref 0.4–2)
LEUKOCYTE ESTERASE UR QL STRIP.AUTO: NEGATIVE
LYMPHOCYTES # BLD AUTO: 3.66 X10*3/UL (ref 1.2–4.8)
LYMPHOCYTES NFR BLD AUTO: 59.3 %
MAGNESIUM SERPL-MCNC: 1.69 MG/DL (ref 1.6–2.4)
MCH RBC QN AUTO: 32.4 PG (ref 26–34)
MCHC RBC AUTO-ENTMCNC: 32.3 G/DL (ref 32–36)
MCV RBC AUTO: 100 FL (ref 80–100)
METHADONE UR QL SCN: NORMAL
MONOCYTES # BLD AUTO: 0.65 X10*3/UL (ref 0.1–1)
MONOCYTES NFR BLD AUTO: 10.5 %
MUCOUS THREADS #/AREA URNS AUTO: ABNORMAL /LPF
NEUTROPHILS # BLD AUTO: 1.79 X10*3/UL (ref 1.2–7.7)
NEUTROPHILS NFR BLD AUTO: 29.1 %
NITRITE UR QL STRIP.AUTO: NEGATIVE
NRBC BLD-RTO: 0 /100 WBCS (ref 0–0)
OPIATES UR QL SCN: NORMAL
OXYCODONE+OXYMORPHONE UR QL SCN: NORMAL
OXYHGB MFR BLDA: 96.8 % (ref 94–98)
OXYHGB MFR BLDA: 96.8 % (ref 94–98)
PCO2 BLDA: 46 MM HG (ref 38–42)
PCO2 BLDA: 46 MM HG (ref 38–42)
PCP UR QL SCN: NORMAL
PH BLDA: 7.26 PH (ref 7.38–7.42)
PH BLDA: 7.26 PH (ref 7.38–7.42)
PH UR STRIP.AUTO: 5 [PH]
PLATELET # BLD AUTO: 296 X10*3/UL (ref 150–450)
PO2 BLDA: 98 MM HG (ref 85–95)
PO2 BLDA: 98 MM HG (ref 85–95)
POTASSIUM BLDA-SCNC: 2.9 MMOL/L (ref 3.5–5.3)
POTASSIUM SERPL-SCNC: 2.5 MMOL/L (ref 3.5–5.3)
PROT SERPL-MCNC: 5.5 G/DL (ref 6.4–8.2)
PROT UR STRIP.AUTO-MCNC: ABNORMAL MG/DL
PROTHROMBIN TIME: 11.7 SECONDS (ref 9.8–12.8)
RBC # BLD AUTO: 4.11 X10*6/UL (ref 4–5.2)
RBC # UR STRIP.AUTO: ABNORMAL /UL
RBC #/AREA URNS AUTO: ABNORMAL /HPF
SALICYLATES SERPL-MCNC: <3 MG/DL
SAO2 % BLDA: 97 % (ref 94–100)
SAO2 % BLDA: 97 % (ref 94–100)
SARS-COV-2 RNA RESP QL NAA+PROBE: NOT DETECTED
SODIUM BLDA-SCNC: 140 MMOL/L (ref 136–145)
SODIUM SERPL-SCNC: 143 MMOL/L (ref 136–145)
SP GR UR STRIP.AUTO: 1.02
SQUAMOUS #/AREA URNS AUTO: ABNORMAL /HPF
UROBILINOGEN UR STRIP.AUTO-MCNC: <2 MG/DL
WBC # BLD AUTO: 6.2 X10*3/UL (ref 4.4–11.3)
WBC #/AREA URNS AUTO: ABNORMAL /HPF

## 2024-06-10 PROCEDURE — 83735 ASSAY OF MAGNESIUM: CPT | Performed by: STUDENT IN AN ORGANIZED HEALTH CARE EDUCATION/TRAINING PROGRAM

## 2024-06-10 PROCEDURE — 84484 ASSAY OF TROPONIN QUANT: CPT | Mod: 91 | Performed by: STUDENT IN AN ORGANIZED HEALTH CARE EDUCATION/TRAINING PROGRAM

## 2024-06-10 PROCEDURE — 80053 COMPREHEN METABOLIC PANEL: CPT | Performed by: STUDENT IN AN ORGANIZED HEALTH CARE EDUCATION/TRAINING PROGRAM

## 2024-06-10 PROCEDURE — 84132 ASSAY OF SERUM POTASSIUM: CPT | Mod: 91 | Performed by: STUDENT IN AN ORGANIZED HEALTH CARE EDUCATION/TRAINING PROGRAM

## 2024-06-10 PROCEDURE — 82746 ASSAY OF FOLIC ACID SERUM: CPT | Performed by: HOSPITALIST

## 2024-06-10 PROCEDURE — 83605 ASSAY OF LACTIC ACID: CPT | Performed by: STUDENT IN AN ORGANIZED HEALTH CARE EDUCATION/TRAINING PROGRAM

## 2024-06-10 PROCEDURE — 94002 VENT MGMT INPAT INIT DAY: CPT

## 2024-06-10 PROCEDURE — 4500999001 HC ED NO CHARGE

## 2024-06-10 PROCEDURE — 80143 DRUG ASSAY ACETAMINOPHEN: CPT | Performed by: STUDENT IN AN ORGANIZED HEALTH CARE EDUCATION/TRAINING PROGRAM

## 2024-06-10 PROCEDURE — 51702 INSERT TEMP BLADDER CATH: CPT

## 2024-06-10 PROCEDURE — 87635 SARS-COV-2 COVID-19 AMP PRB: CPT | Performed by: STUDENT IN AN ORGANIZED HEALTH CARE EDUCATION/TRAINING PROGRAM

## 2024-06-10 PROCEDURE — 82805 BLOOD GASES W/O2 SATURATION: CPT | Performed by: STUDENT IN AN ORGANIZED HEALTH CARE EDUCATION/TRAINING PROGRAM

## 2024-06-10 PROCEDURE — 31500 INSERT EMERGENCY AIRWAY: CPT | Performed by: STUDENT IN AN ORGANIZED HEALTH CARE EDUCATION/TRAINING PROGRAM

## 2024-06-10 PROCEDURE — 80143 DRUG ASSAY ACETAMINOPHEN: CPT | Mod: 91 | Performed by: HOSPITALIST

## 2024-06-10 PROCEDURE — 70450 CT HEAD/BRAIN W/O DYE: CPT | Performed by: RADIOLOGY

## 2024-06-10 PROCEDURE — 36415 COLL VENOUS BLD VENIPUNCTURE: CPT | Performed by: STUDENT IN AN ORGANIZED HEALTH CARE EDUCATION/TRAINING PROGRAM

## 2024-06-10 PROCEDURE — 85025 COMPLETE CBC W/AUTO DIFF WBC: CPT | Performed by: STUDENT IN AN ORGANIZED HEALTH CARE EDUCATION/TRAINING PROGRAM

## 2024-06-10 PROCEDURE — 71045 X-RAY EXAM CHEST 1 VIEW: CPT | Performed by: SURGERY

## 2024-06-10 PROCEDURE — 99291 CRITICAL CARE FIRST HOUR: CPT | Mod: CS | Performed by: STUDENT IN AN ORGANIZED HEALTH CARE EDUCATION/TRAINING PROGRAM

## 2024-06-10 PROCEDURE — 96361 HYDRATE IV INFUSION ADD-ON: CPT | Mod: 59

## 2024-06-10 PROCEDURE — 71045 X-RAY EXAM CHEST 1 VIEW: CPT

## 2024-06-10 PROCEDURE — 85610 PROTHROMBIN TIME: CPT | Performed by: STUDENT IN AN ORGANIZED HEALTH CARE EDUCATION/TRAINING PROGRAM

## 2024-06-10 PROCEDURE — 72125 CT NECK SPINE W/O DYE: CPT

## 2024-06-10 PROCEDURE — 80179 DRUG ASSAY SALICYLATE: CPT | Mod: 91 | Performed by: HOSPITALIST

## 2024-06-10 PROCEDURE — 82140 ASSAY OF AMMONIA: CPT | Performed by: STUDENT IN AN ORGANIZED HEALTH CARE EDUCATION/TRAINING PROGRAM

## 2024-06-10 PROCEDURE — 80307 DRUG TEST PRSMV CHEM ANLYZR: CPT | Performed by: STUDENT IN AN ORGANIZED HEALTH CARE EDUCATION/TRAINING PROGRAM

## 2024-06-10 PROCEDURE — 96365 THER/PROPH/DIAG IV INF INIT: CPT | Mod: 59

## 2024-06-10 PROCEDURE — 2500000005 HC RX 250 GENERAL PHARMACY W/O HCPCS: Performed by: STUDENT IN AN ORGANIZED HEALTH CARE EDUCATION/TRAINING PROGRAM

## 2024-06-10 PROCEDURE — 2500000004 HC RX 250 GENERAL PHARMACY W/ HCPCS (ALT 636 FOR OP/ED): Performed by: STUDENT IN AN ORGANIZED HEALTH CARE EDUCATION/TRAINING PROGRAM

## 2024-06-10 PROCEDURE — 93005 ELECTROCARDIOGRAM TRACING: CPT

## 2024-06-10 PROCEDURE — 81001 URINALYSIS AUTO W/SCOPE: CPT | Performed by: STUDENT IN AN ORGANIZED HEALTH CARE EDUCATION/TRAINING PROGRAM

## 2024-06-10 PROCEDURE — 0BH17EZ INSERTION OF ENDOTRACHEAL AIRWAY INTO TRACHEA, VIA NATURAL OR ARTIFICIAL OPENING: ICD-10-PCS | Performed by: INTERNAL MEDICINE

## 2024-06-10 PROCEDURE — 83880 ASSAY OF NATRIURETIC PEPTIDE: CPT | Performed by: STUDENT IN AN ORGANIZED HEALTH CARE EDUCATION/TRAINING PROGRAM

## 2024-06-10 PROCEDURE — 82607 VITAMIN B-12: CPT | Performed by: HOSPITALIST

## 2024-06-10 PROCEDURE — 84132 ASSAY OF SERUM POTASSIUM: CPT | Performed by: STUDENT IN AN ORGANIZED HEALTH CARE EDUCATION/TRAINING PROGRAM

## 2024-06-10 PROCEDURE — 5A1935Z RESPIRATORY VENTILATION, LESS THAN 24 CONSECUTIVE HOURS: ICD-10-PCS | Performed by: INTERNAL MEDICINE

## 2024-06-10 PROCEDURE — 84484 ASSAY OF TROPONIN QUANT: CPT | Performed by: STUDENT IN AN ORGANIZED HEALTH CARE EDUCATION/TRAINING PROGRAM

## 2024-06-10 PROCEDURE — 70450 CT HEAD/BRAIN W/O DYE: CPT

## 2024-06-10 PROCEDURE — 96366 THER/PROPH/DIAG IV INF ADDON: CPT | Mod: 59

## 2024-06-10 PROCEDURE — 72125 CT NECK SPINE W/O DYE: CPT | Performed by: RADIOLOGY

## 2024-06-10 PROCEDURE — 96374 THER/PROPH/DIAG INJ IV PUSH: CPT | Mod: 59

## 2024-06-10 RX ORDER — FENTANYL CITRATE-0.9 % NACL/PF 10 MCG/ML
0-300 PLASTIC BAG, INJECTION (ML) INTRAVENOUS CONTINUOUS
Status: DISCONTINUED | OUTPATIENT
Start: 2024-06-10 | End: 2024-06-11

## 2024-06-10 RX ORDER — ROCURONIUM BROMIDE 10 MG/ML
100 INJECTION, SOLUTION INTRAVENOUS ONCE
Status: DISCONTINUED | OUTPATIENT
Start: 2024-06-10 | End: 2024-06-11 | Stop reason: HOSPADM

## 2024-06-10 RX ORDER — ROCURONIUM BROMIDE 10 MG/ML
100 INJECTION, SOLUTION INTRAVENOUS ONCE
Status: COMPLETED | OUTPATIENT
Start: 2024-06-10 | End: 2024-06-10

## 2024-06-10 RX ORDER — ETOMIDATE 2 MG/ML
20 INJECTION INTRAVENOUS ONCE
Status: DISCONTINUED | OUTPATIENT
Start: 2024-06-10 | End: 2024-06-11 | Stop reason: HOSPADM

## 2024-06-10 RX ORDER — POTASSIUM CHLORIDE 14.9 MG/ML
20 INJECTION INTRAVENOUS
Status: DISCONTINUED | OUTPATIENT
Start: 2024-06-10 | End: 2024-06-11

## 2024-06-10 RX ORDER — PROPOFOL 10 MG/ML
5-50 INJECTION, EMULSION INTRAVENOUS CONTINUOUS
Status: DISCONTINUED | OUTPATIENT
Start: 2024-06-10 | End: 2024-06-11

## 2024-06-10 RX ORDER — ETOMIDATE 2 MG/ML
20 INJECTION INTRAVENOUS ONCE
Status: COMPLETED | OUTPATIENT
Start: 2024-06-10 | End: 2024-06-10

## 2024-06-10 RX ORDER — MAGNESIUM SULFATE HEPTAHYDRATE 40 MG/ML
2 INJECTION, SOLUTION INTRAVENOUS ONCE
Status: COMPLETED | OUTPATIENT
Start: 2024-06-10 | End: 2024-06-11

## 2024-06-10 RX ADMIN — ROCURONIUM BROMIDE 100 MG: 50 INJECTION, SOLUTION INTRAVENOUS at 21:23

## 2024-06-10 RX ADMIN — POTASSIUM CHLORIDE 20 MEQ: 14.9 INJECTION, SOLUTION INTRAVENOUS at 22:45

## 2024-06-10 RX ADMIN — SODIUM CHLORIDE 1000 ML: 9 INJECTION, SOLUTION INTRAVENOUS at 22:07

## 2024-06-10 RX ADMIN — ETOMIDATE 20 MG: 20 INJECTION, SOLUTION INTRAVENOUS at 21:23

## 2024-06-10 ASSESSMENT — PAIN - FUNCTIONAL ASSESSMENT
PAIN_FUNCTIONAL_ASSESSMENT: UNABLE TO SELF-REPORT
PAIN_FUNCTIONAL_ASSESSMENT: UNABLE TO SELF-REPORT

## 2024-06-10 NOTE — Clinical Note
Patient Clipped and Prepped: right radial. Prepped with ChloraPrep, a minimum of 3 minute dry time, longer if needed, no pooling noted, patient draped in sterile fashion. Right arm noted with edema per physician

## 2024-06-11 ENCOUNTER — APPOINTMENT (OUTPATIENT)
Dept: RADIOLOGY | Facility: HOSPITAL | Age: 69
End: 2024-06-11
Payer: MEDICARE

## 2024-06-11 ENCOUNTER — APPOINTMENT (OUTPATIENT)
Dept: CARDIOLOGY | Facility: HOSPITAL | Age: 69
End: 2024-06-11
Payer: MEDICARE

## 2024-06-11 PROBLEM — I50.31 ACUTE DIASTOLIC CONGESTIVE HEART FAILURE (MULTI): Status: ACTIVE | Noted: 2024-06-10

## 2024-06-11 PROBLEM — E78.5 HYPERLIPIDEMIA: Status: RESOLVED | Noted: 2023-10-23 | Resolved: 2024-06-11

## 2024-06-11 PROBLEM — I49.3 PVC (PREMATURE VENTRICULAR CONTRACTION): Status: ACTIVE | Noted: 2024-06-10

## 2024-06-11 PROBLEM — R55 SYNCOPE AND COLLAPSE: Status: ACTIVE | Noted: 2024-06-10

## 2024-06-11 PROBLEM — R41.89 UNRESPONSIVE: Status: ACTIVE | Noted: 2024-06-11

## 2024-06-11 LAB
ALBUMIN SERPL BCP-MCNC: 4 G/DL (ref 3.4–5)
ALP SERPL-CCNC: 48 U/L (ref 33–136)
ALT SERPL W P-5'-P-CCNC: 59 U/L (ref 7–45)
ANION GAP BLDA CALCULATED.4IONS-SCNC: 18 MMO/L (ref 10–25)
ANION GAP BLDV CALCULATED.4IONS-SCNC: 18 MMOL/L (ref 10–25)
ANION GAP SERPL CALC-SCNC: 21 MMOL/L (ref 10–20)
AORTIC VALVE MEAN GRADIENT: 6 MMHG
AORTIC VALVE PEAK VELOCITY: 1.61 M/S
APAP SERPL-MCNC: <10 UG/ML
AST SERPL W P-5'-P-CCNC: 59 U/L (ref 9–39)
AV PEAK GRADIENT: 10.4 MMHG
AVA (PEAK VEL): 2.48 CM2
AVA (VTI): 2.55 CM2
BASE EXCESS BLDA CALC-SCNC: -7.3 MMOL/L (ref -2–3)
BASE EXCESS BLDV CALC-SCNC: -5.2 MMOL/L (ref -2–3)
BASOPHILS # BLD AUTO: 0.02 X10*3/UL (ref 0–0.1)
BASOPHILS NFR BLD AUTO: 0.3 %
BILIRUB SERPL-MCNC: 1.2 MG/DL (ref 0–1.2)
BODY TEMPERATURE: ABNORMAL
BODY TEMPERATURE: ABNORMAL
BUN SERPL-MCNC: 19 MG/DL (ref 6–23)
CA-I BLDA-SCNC: 1.15 MMOL/L (ref 1.1–1.33)
CA-I BLDV-SCNC: 1.02 MMOL/L (ref 1.1–1.33)
CALCIUM SERPL-MCNC: 8.7 MG/DL (ref 8.6–10.3)
CHLORIDE BLDA-SCNC: 106 MMOL/L (ref 98–107)
CHLORIDE BLDV-SCNC: 103 MMOL/L (ref 98–107)
CHLORIDE SERPL-SCNC: 107 MMOL/L (ref 98–107)
CHOLEST SERPL-MCNC: 177 MG/DL (ref 0–199)
CHOLESTEROL/HDL RATIO: 3.9
CK SERPL-CCNC: 351 U/L (ref 0–215)
CO2 SERPL-SCNC: 15 MMOL/L (ref 21–32)
CREAT SERPL-MCNC: 0.95 MG/DL (ref 0.5–1.05)
CRITICAL CALL TIME: 412
CRITICAL CALL TIME: 939
CRITICAL CALLED BY: ABNORMAL
CRITICAL CALLED BY: ABNORMAL
CRITICAL CALLED TO: ABNORMAL
CRITICAL CALLED TO: ABNORMAL
CRITICAL READ BACK: ABNORMAL
CRITICAL READ BACK: ABNORMAL
EGFRCR SERPLBLD CKD-EPI 2021: 65 ML/MIN/1.73M*2
EJECTION FRACTION APICAL 4 CHAMBER: 43.1
EOSINOPHIL # BLD AUTO: 0 X10*3/UL (ref 0–0.7)
EOSINOPHIL NFR BLD AUTO: 0 %
ERYTHROCYTE [DISTWIDTH] IN BLOOD BY AUTOMATED COUNT: 13.9 % (ref 11.5–14.5)
FOLATE SERPL-MCNC: 8 NG/ML
GLUCOSE BLD MANUAL STRIP-MCNC: 100 MG/DL (ref 74–99)
GLUCOSE BLD MANUAL STRIP-MCNC: 111 MG/DL (ref 74–99)
GLUCOSE BLD MANUAL STRIP-MCNC: 121 MG/DL (ref 74–99)
GLUCOSE BLD MANUAL STRIP-MCNC: 170 MG/DL (ref 74–99)
GLUCOSE BLD MANUAL STRIP-MCNC: 80 MG/DL (ref 74–99)
GLUCOSE BLD MANUAL STRIP-MCNC: 91 MG/DL (ref 74–99)
GLUCOSE BLDA-MCNC: 134 MG/DL (ref 74–99)
GLUCOSE BLDV-MCNC: 121 MG/DL (ref 74–99)
GLUCOSE SERPL-MCNC: 102 MG/DL (ref 74–99)
HCO3 BLDA-SCNC: 18.8 MMOL/L (ref 22–26)
HCO3 BLDV-SCNC: 20.6 MMOL/L (ref 22–26)
HCT VFR BLD AUTO: 42.8 % (ref 36–46)
HCT VFR BLD EST: 38 % (ref 36–46)
HCT VFR BLD EST: 40 % (ref 36–46)
HDLC SERPL-MCNC: 45.5 MG/DL
HGB BLD-MCNC: 13.5 G/DL (ref 12–16)
HGB BLDA-MCNC: 12.7 G/DL (ref 12–16)
HGB BLDV-MCNC: 13.4 G/DL (ref 12–16)
HOLD SPECIMEN: NORMAL
HOLD SPECIMEN: NORMAL
IMM GRANULOCYTES # BLD AUTO: 0.03 X10*3/UL (ref 0–0.7)
IMM GRANULOCYTES NFR BLD AUTO: 0.4 % (ref 0–0.9)
INHALED O2 CONCENTRATION: 40 %
INHALED O2 CONCENTRATION: 50 %
LACTATE BLDA-SCNC: 4.4 MMOL/L (ref 0.4–2)
LACTATE BLDV-SCNC: 6.2 MMOL/L (ref 0.4–2)
LACTATE SERPL-SCNC: 2.3 MMOL/L (ref 0.4–2)
LACTATE SERPL-SCNC: 2.4 MMOL/L (ref 0.4–2)
LACTATE SERPL-SCNC: 5.4 MMOL/L (ref 0.4–2)
LACTATE SERPL-SCNC: 6.1 MMOL/L (ref 0.4–2)
LDLC SERPL CALC-MCNC: 70 MG/DL
LEFT ATRIUM VOLUME AREA LENGTH INDEX BSA: 26.6 ML/M2
LEFT VENTRICLE INTERNAL DIMENSION DIASTOLE: 5.2 CM (ref 3.5–6)
LEFT VENTRICULAR OUTFLOW TRACT DIAMETER: 2 CM
LV EJECTION FRACTION BIPLANE: 45 %
LYMPHOCYTES # BLD AUTO: 1.48 X10*3/UL (ref 1.2–4.8)
LYMPHOCYTES NFR BLD AUTO: 20.2 %
MAGNESIUM SERPL-MCNC: 3.01 MG/DL (ref 1.6–2.4)
MCH RBC QN AUTO: 32.4 PG (ref 26–34)
MCHC RBC AUTO-ENTMCNC: 31.5 G/DL (ref 32–36)
MCV RBC AUTO: 103 FL (ref 80–100)
MITRAL VALVE E/A RATIO: 0.97
MITRAL VALVE E/E' RATIO: 7.88
MONOCYTES # BLD AUTO: 0.45 X10*3/UL (ref 0.1–1)
MONOCYTES NFR BLD AUTO: 6.1 %
MRSA DNA SPEC QL NAA+PROBE: NOT DETECTED
NEUTROPHILS # BLD AUTO: 5.36 X10*3/UL (ref 1.2–7.7)
NEUTROPHILS NFR BLD AUTO: 73 %
NON HDL CHOLESTEROL: 132 MG/DL (ref 0–149)
NRBC BLD-RTO: 0 /100 WBCS (ref 0–0)
OXYHGB MFR BLDA: 97 % (ref 94–98)
OXYHGB MFR BLDV: 61.9 % (ref 45–75)
PCO2 BLDA: 39 MM HG (ref 38–42)
PCO2 BLDV: 40 MM HG (ref 41–51)
PEEP CMH2O: 5 CM H2O
PEEP CMH2O: 5 CM H2O
PH BLDA: 7.29 PH (ref 7.38–7.42)
PH BLDV: 7.32 PH (ref 7.33–7.43)
PHOSPHATE SERPL-MCNC: 3.5 MG/DL (ref 2.5–4.9)
PLATELET # BLD AUTO: 238 X10*3/UL (ref 150–450)
PO2 BLDA: 119 MM HG (ref 85–95)
PO2 BLDV: 38 MM HG (ref 35–45)
POTASSIUM BLDA-SCNC: 4.7 MMOL/L (ref 3.5–5.3)
POTASSIUM BLDV-SCNC: 4.7 MMOL/L (ref 3.5–5.3)
POTASSIUM SERPL-SCNC: 3 MMOL/L (ref 3.5–5.3)
POTASSIUM SERPL-SCNC: 4.6 MMOL/L (ref 3.5–5.3)
PROCALCITONIN SERPL-MCNC: 0.14 NG/ML
PROT SERPL-MCNC: 7.2 G/DL (ref 6.4–8.2)
RBC # BLD AUTO: 4.17 X10*6/UL (ref 4–5.2)
RIGHT VENTRICLE FREE WALL PEAK S': 12.7 CM/S
RIGHT VENTRICLE PEAK SYSTOLIC PRESSURE: 15.3 MMHG
SALICYLATES SERPL-MCNC: <3 MG/DL
SAO2 % BLDA: 98 % (ref 94–100)
SAO2 % BLDV: 63 % (ref 45–75)
SODIUM BLDA-SCNC: 138 MMOL/L (ref 136–145)
SODIUM BLDV-SCNC: 137 MMOL/L (ref 136–145)
SODIUM SERPL-SCNC: 138 MMOL/L (ref 136–145)
TIDAL VOLUME: 400 ML
TIDAL VOLUME: 400 ML
TRICUSPID ANNULAR PLANE SYSTOLIC EXCURSION: 2.2 CM
TRIGL SERPL-MCNC: 309 MG/DL (ref 0–149)
TSH SERPL-ACNC: 2.61 MIU/L (ref 0.44–3.98)
VANCOMYCIN SERPL-MCNC: 8.3 UG/ML (ref 5–20)
VENTILATOR MODE: ABNORMAL
VENTILATOR RATE: 18 BPM
VENTILATOR RATE: 18 BPM
VIT B12 SERPL-MCNC: 374 PG/ML (ref 211–911)
VLDL: 62 MG/DL (ref 0–40)
WBC # BLD AUTO: 7.3 X10*3/UL (ref 4.4–11.3)

## 2024-06-11 PROCEDURE — 83735 ASSAY OF MAGNESIUM: CPT | Performed by: HOSPITALIST

## 2024-06-11 PROCEDURE — 96374 THER/PROPH/DIAG INJ IV PUSH: CPT

## 2024-06-11 PROCEDURE — 93306 TTE W/DOPPLER COMPLETE: CPT | Performed by: INTERNAL MEDICINE

## 2024-06-11 PROCEDURE — 2500000004 HC RX 250 GENERAL PHARMACY W/ HCPCS (ALT 636 FOR OP/ED): Performed by: HOSPITALIST

## 2024-06-11 PROCEDURE — 80061 LIPID PANEL: CPT | Performed by: NURSE PRACTITIONER

## 2024-06-11 PROCEDURE — 71045 X-RAY EXAM CHEST 1 VIEW: CPT | Performed by: RADIOLOGY

## 2024-06-11 PROCEDURE — 36415 COLL VENOUS BLD VENIPUNCTURE: CPT | Performed by: HOSPITALIST

## 2024-06-11 PROCEDURE — 74177 CT ABD & PELVIS W/CONTRAST: CPT

## 2024-06-11 PROCEDURE — 84100 ASSAY OF PHOSPHORUS: CPT | Performed by: HOSPITALIST

## 2024-06-11 PROCEDURE — 71045 X-RAY EXAM CHEST 1 VIEW: CPT

## 2024-06-11 PROCEDURE — 85025 COMPLETE CBC W/AUTO DIFF WBC: CPT | Performed by: HOSPITALIST

## 2024-06-11 PROCEDURE — 83605 ASSAY OF LACTIC ACID: CPT | Mod: 91,MUE | Performed by: HOSPITALIST

## 2024-06-11 PROCEDURE — 83605 ASSAY OF LACTIC ACID: CPT | Mod: 91

## 2024-06-11 PROCEDURE — 2020000001 HC ICU ROOM DAILY

## 2024-06-11 PROCEDURE — 87040 BLOOD CULTURE FOR BACTERIA: CPT | Mod: ELYLAB

## 2024-06-11 PROCEDURE — 82947 ASSAY GLUCOSE BLOOD QUANT: CPT | Mod: 91

## 2024-06-11 PROCEDURE — 2500000004 HC RX 250 GENERAL PHARMACY W/ HCPCS (ALT 636 FOR OP/ED)

## 2024-06-11 PROCEDURE — 93306 TTE W/DOPPLER COMPLETE: CPT

## 2024-06-11 PROCEDURE — 93880 EXTRACRANIAL BILAT STUDY: CPT

## 2024-06-11 PROCEDURE — C9113 INJ PANTOPRAZOLE SODIUM, VIA: HCPCS | Performed by: HOSPITALIST

## 2024-06-11 PROCEDURE — 93880 EXTRACRANIAL BILAT STUDY: CPT | Performed by: RADIOLOGY

## 2024-06-11 PROCEDURE — 2550000001 HC RX 255 CONTRASTS: Performed by: STUDENT IN AN ORGANIZED HEALTH CARE EDUCATION/TRAINING PROGRAM

## 2024-06-11 PROCEDURE — 36600 WITHDRAWAL OF ARTERIAL BLOOD: CPT

## 2024-06-11 PROCEDURE — 87641 MR-STAPH DNA AMP PROBE: CPT

## 2024-06-11 PROCEDURE — 2500000004 HC RX 250 GENERAL PHARMACY W/ HCPCS (ALT 636 FOR OP/ED): Performed by: STUDENT IN AN ORGANIZED HEALTH CARE EDUCATION/TRAINING PROGRAM

## 2024-06-11 PROCEDURE — 2500000005 HC RX 250 GENERAL PHARMACY W/O HCPCS: Performed by: HOSPITALIST

## 2024-06-11 PROCEDURE — 99291 CRITICAL CARE FIRST HOUR: CPT | Performed by: HOSPITALIST

## 2024-06-11 PROCEDURE — 2500000002 HC RX 250 W HCPCS SELF ADMINISTERED DRUGS (ALT 637 FOR MEDICARE OP, ALT 636 FOR OP/ED): Performed by: HOSPITALIST

## 2024-06-11 PROCEDURE — 84132 ASSAY OF SERUM POTASSIUM: CPT | Performed by: HOSPITALIST

## 2024-06-11 PROCEDURE — 2500000001 HC RX 250 WO HCPCS SELF ADMINISTERED DRUGS (ALT 637 FOR MEDICARE OP)

## 2024-06-11 PROCEDURE — 99223 1ST HOSP IP/OBS HIGH 75: CPT | Performed by: INTERNAL MEDICINE

## 2024-06-11 PROCEDURE — 74177 CT ABD & PELVIS W/CONTRAST: CPT | Performed by: RADIOLOGY

## 2024-06-11 PROCEDURE — 2500000001 HC RX 250 WO HCPCS SELF ADMINISTERED DRUGS (ALT 637 FOR MEDICARE OP): Performed by: NURSE PRACTITIONER

## 2024-06-11 PROCEDURE — 84443 ASSAY THYROID STIM HORMONE: CPT | Performed by: HOSPITALIST

## 2024-06-11 PROCEDURE — 84145 PROCALCITONIN (PCT): CPT | Mod: ELYLAB

## 2024-06-11 PROCEDURE — 84132 ASSAY OF SERUM POTASSIUM: CPT | Mod: 91 | Performed by: HOSPITALIST

## 2024-06-11 PROCEDURE — 71260 CT THORAX DX C+: CPT | Performed by: RADIOLOGY

## 2024-06-11 PROCEDURE — 82550 ASSAY OF CK (CPK): CPT | Performed by: STUDENT IN AN ORGANIZED HEALTH CARE EDUCATION/TRAINING PROGRAM

## 2024-06-11 PROCEDURE — 94003 VENT MGMT INPAT SUBQ DAY: CPT

## 2024-06-11 PROCEDURE — 80202 ASSAY OF VANCOMYCIN: CPT | Performed by: STUDENT IN AN ORGANIZED HEALTH CARE EDUCATION/TRAINING PROGRAM

## 2024-06-11 RX ORDER — PANTOPRAZOLE SODIUM 40 MG/10ML
40 INJECTION, POWDER, LYOPHILIZED, FOR SOLUTION INTRAVENOUS DAILY
Status: DISCONTINUED | OUTPATIENT
Start: 2024-06-11 | End: 2024-06-13 | Stop reason: HOSPADM

## 2024-06-11 RX ORDER — DEXTROSE, SODIUM CHLORIDE, SODIUM LACTATE, POTASSIUM CHLORIDE, AND CALCIUM CHLORIDE 5; .6; .31; .03; .02 G/100ML; G/100ML; G/100ML; G/100ML; G/100ML
50 INJECTION, SOLUTION INTRAVENOUS CONTINUOUS
Status: DISCONTINUED | OUTPATIENT
Start: 2024-06-11 | End: 2024-06-12

## 2024-06-11 RX ORDER — MAGNESIUM SULFATE HEPTAHYDRATE 40 MG/ML
2 INJECTION, SOLUTION INTRAVENOUS ONCE
Status: COMPLETED | OUTPATIENT
Start: 2024-06-11 | End: 2024-06-11

## 2024-06-11 RX ORDER — ASPIRIN 81 MG/1
81 TABLET ORAL DAILY
Status: DISCONTINUED | OUTPATIENT
Start: 2024-06-11 | End: 2024-06-13 | Stop reason: HOSPADM

## 2024-06-11 RX ORDER — SODIUM CHLORIDE 0.9 % (FLUSH) 0.9 %
10 SYRINGE (ML) INJECTION ONCE
Status: COMPLETED | OUTPATIENT
Start: 2024-06-11 | End: 2024-06-11

## 2024-06-11 RX ORDER — ACETAMINOPHEN 160 MG/5ML
650 SOLUTION ORAL EVERY 6 HOURS PRN
Status: DISCONTINUED | OUTPATIENT
Start: 2024-06-11 | End: 2024-06-13 | Stop reason: HOSPADM

## 2024-06-11 RX ORDER — ATORVASTATIN CALCIUM 20 MG/1
40 TABLET, FILM COATED ORAL NIGHTLY
Status: DISCONTINUED | OUTPATIENT
Start: 2024-06-11 | End: 2024-06-13 | Stop reason: HOSPADM

## 2024-06-11 RX ORDER — VANCOMYCIN HYDROCHLORIDE 1 G/200ML
1000 INJECTION, SOLUTION INTRAVENOUS EVERY 12 HOURS
Status: DISCONTINUED | OUTPATIENT
Start: 2024-06-11 | End: 2024-06-12

## 2024-06-11 RX ORDER — POTASSIUM CHLORIDE 14.9 MG/ML
20 INJECTION INTRAVENOUS ONCE
Status: COMPLETED | OUTPATIENT
Start: 2024-06-11 | End: 2024-06-11

## 2024-06-11 RX ORDER — DEXMEDETOMIDINE HYDROCHLORIDE 4 UG/ML
.1-1.5 INJECTION, SOLUTION INTRAVENOUS CONTINUOUS
Status: DISCONTINUED | OUTPATIENT
Start: 2024-06-11 | End: 2024-06-11

## 2024-06-11 RX ORDER — INSULIN LISPRO 100 [IU]/ML
0-5 INJECTION, SOLUTION INTRAVENOUS; SUBCUTANEOUS EVERY 4 HOURS
Status: DISCONTINUED | OUTPATIENT
Start: 2024-06-11 | End: 2024-06-11

## 2024-06-11 RX ORDER — DEXTROSE 50 % IN WATER (D50W) INTRAVENOUS SYRINGE
12.5
Status: DISCONTINUED | OUTPATIENT
Start: 2024-06-11 | End: 2024-06-13 | Stop reason: HOSPADM

## 2024-06-11 RX ORDER — ENOXAPARIN SODIUM 100 MG/ML
40 INJECTION SUBCUTANEOUS EVERY 24 HOURS
Status: DISCONTINUED | OUTPATIENT
Start: 2024-06-11 | End: 2024-06-13 | Stop reason: HOSPADM

## 2024-06-11 RX ORDER — SODIUM CHLORIDE, SODIUM LACTATE, POTASSIUM CHLORIDE, CALCIUM CHLORIDE 600; 310; 30; 20 MG/100ML; MG/100ML; MG/100ML; MG/100ML
125 INJECTION, SOLUTION INTRAVENOUS CONTINUOUS
Status: DISCONTINUED | OUTPATIENT
Start: 2024-06-11 | End: 2024-06-11

## 2024-06-11 RX ORDER — VANCOMYCIN HYDROCHLORIDE 1 G/20ML
INJECTION, POWDER, LYOPHILIZED, FOR SOLUTION INTRAVENOUS DAILY PRN
Status: DISCONTINUED | OUTPATIENT
Start: 2024-06-11 | End: 2024-06-12

## 2024-06-11 RX ORDER — METOPROLOL TARTRATE 25 MG/1
25 TABLET, FILM COATED ORAL 2 TIMES DAILY
Status: DISCONTINUED | OUTPATIENT
Start: 2024-06-11 | End: 2024-06-12

## 2024-06-11 RX ORDER — DEXTROSE 50 % IN WATER (D50W) INTRAVENOUS SYRINGE
25
Status: DISCONTINUED | OUTPATIENT
Start: 2024-06-11 | End: 2024-06-13 | Stop reason: HOSPADM

## 2024-06-11 RX ORDER — INSULIN LISPRO 100 [IU]/ML
0-10 INJECTION, SOLUTION INTRAVENOUS; SUBCUTANEOUS
Status: DISCONTINUED | OUTPATIENT
Start: 2024-06-12 | End: 2024-06-13 | Stop reason: HOSPADM

## 2024-06-11 RX ORDER — DEXMEDETOMIDINE HYDROCHLORIDE 4 UG/ML
INJECTION, SOLUTION INTRAVENOUS
Status: COMPLETED
Start: 2024-06-11 | End: 2024-06-11

## 2024-06-11 RX ADMIN — DEXMEDETOMIDINE HYDROCHLORIDE 0.2 MCG/KG/HR: 4 INJECTION, SOLUTION INTRAVENOUS at 10:15

## 2024-06-11 RX ADMIN — MAGNESIUM SULFATE HEPTAHYDRATE 2 G: 40 INJECTION, SOLUTION INTRAVENOUS at 17:38

## 2024-06-11 RX ADMIN — INSULIN LISPRO 1 UNITS: 100 INJECTION, SOLUTION INTRAVENOUS; SUBCUTANEOUS at 17:08

## 2024-06-11 RX ADMIN — MAGNESIUM SULFATE HEPTAHYDRATE 2 G: 40 INJECTION, SOLUTION INTRAVENOUS at 00:03

## 2024-06-11 RX ADMIN — SODIUM CHLORIDE, POTASSIUM CHLORIDE, SODIUM LACTATE AND CALCIUM CHLORIDE 125 ML/HR: 600; 310; 30; 20 INJECTION, SOLUTION INTRAVENOUS at 18:07

## 2024-06-11 RX ADMIN — Medication 3 L/MIN: at 14:00

## 2024-06-11 RX ADMIN — PERFLUTREN 3 ML OF DILUTION: 6.52 INJECTION, SUSPENSION INTRAVENOUS at 09:25

## 2024-06-11 RX ADMIN — PANTOPRAZOLE SODIUM 40 MG: 40 INJECTION, POWDER, FOR SOLUTION INTRAVENOUS at 08:58

## 2024-06-11 RX ADMIN — MAGNESIUM SULFATE HEPTAHYDRATE 2 G: 40 INJECTION, SOLUTION INTRAVENOUS at 01:22

## 2024-06-11 RX ADMIN — SODIUM CHLORIDE, POTASSIUM CHLORIDE, SODIUM LACTATE AND CALCIUM CHLORIDE 125 ML/HR: 600; 310; 30; 20 INJECTION, SOLUTION INTRAVENOUS at 02:40

## 2024-06-11 RX ADMIN — Medication 3 L/MIN: at 16:00

## 2024-06-11 RX ADMIN — ASPIRIN 81 MG: 81 TABLET, COATED ORAL at 15:27

## 2024-06-11 RX ADMIN — Medication 50 PERCENT: at 02:00

## 2024-06-11 RX ADMIN — ATORVASTATIN CALCIUM 40 MG: 20 TABLET, FILM COATED ORAL at 20:19

## 2024-06-11 RX ADMIN — ACETAMINOPHEN 650 MG: 325 SOLUTION ORAL at 13:29

## 2024-06-11 RX ADMIN — Medication 2 L/MIN: at 18:00

## 2024-06-11 RX ADMIN — POTASSIUM CHLORIDE 20 MEQ: 14.9 INJECTION, SOLUTION INTRAVENOUS at 02:52

## 2024-06-11 RX ADMIN — SODIUM CHLORIDE, SODIUM LACTATE, POTASSIUM CHLORIDE, CALCIUM CHLORIDE AND DEXTROSE MONOHYDRATE 50 ML/HR: 5; 600; 310; 30; 20 INJECTION, SOLUTION INTRAVENOUS at 21:30

## 2024-06-11 RX ADMIN — METOPROLOL TARTRATE 25 MG: 25 TABLET, FILM COATED ORAL at 15:27

## 2024-06-11 RX ADMIN — PIPERACILLIN SODIUM AND TAZOBACTAM SODIUM 3.38 G: 3; .375 INJECTION, SOLUTION INTRAVENOUS at 14:33

## 2024-06-11 RX ADMIN — PROPOFOL 25 MCG/KG/MIN: 10 INJECTION, EMULSION INTRAVENOUS at 04:53

## 2024-06-11 RX ADMIN — IOHEXOL 75 ML: 350 INJECTION, SOLUTION INTRAVENOUS at 10:49

## 2024-06-11 RX ADMIN — PROPOFOL 20 MCG/KG/MIN: 10 INJECTION, EMULSION INTRAVENOUS at 11:04

## 2024-06-11 RX ADMIN — ENOXAPARIN SODIUM 40 MG: 40 INJECTION SUBCUTANEOUS at 05:16

## 2024-06-11 RX ADMIN — POTASSIUM CHLORIDE 20 MEQ: 14.9 INJECTION, SOLUTION INTRAVENOUS at 01:10

## 2024-06-11 RX ADMIN — SODIUM CHLORIDE, POTASSIUM CHLORIDE, SODIUM LACTATE AND CALCIUM CHLORIDE 1000 ML: 600; 310; 30; 20 INJECTION, SOLUTION INTRAVENOUS at 12:18

## 2024-06-11 RX ADMIN — Medication 10 ML: at 14:33

## 2024-06-11 RX ADMIN — SODIUM CHLORIDE, PRESERVATIVE FREE 10 ML: 5 INJECTION INTRAVENOUS at 20:21

## 2024-06-11 RX ADMIN — PIPERACILLIN SODIUM AND TAZOBACTAM SODIUM 3.38 G: 3; .375 INJECTION, SOLUTION INTRAVENOUS at 20:20

## 2024-06-11 RX ADMIN — VANCOMYCIN HYDROCHLORIDE 1000 MG: 1 INJECTION, SOLUTION INTRAVENOUS at 15:28

## 2024-06-11 RX ADMIN — DEXMEDETOMIDINE HYDROCHLORIDE 0.2 MCG/KG/HR: 400 INJECTION INTRAVENOUS at 10:15

## 2024-06-11 RX ADMIN — Medication 2 L/MIN: at 20:00

## 2024-06-11 SDOH — SOCIAL STABILITY: SOCIAL INSECURITY: HAVE YOU HAD THOUGHTS OF HARMING ANYONE ELSE?: NO

## 2024-06-11 SDOH — SOCIAL STABILITY: SOCIAL INSECURITY: WERE YOU ABLE TO COMPLETE ALL THE BEHAVIORAL HEALTH SCREENINGS?: YES

## 2024-06-11 SDOH — SOCIAL STABILITY: SOCIAL INSECURITY: ARE THERE ANY APPARENT SIGNS OF INJURIES/BEHAVIORS THAT COULD BE RELATED TO ABUSE/NEGLECT?: NO

## 2024-06-11 SDOH — SOCIAL STABILITY: SOCIAL INSECURITY: HAS ANYONE EVER THREATENED TO HURT YOUR FAMILY OR YOUR PETS?: NO

## 2024-06-11 SDOH — SOCIAL STABILITY: SOCIAL INSECURITY: ARE YOU OR HAVE YOU BEEN THREATENED OR ABUSED PHYSICALLY, EMOTIONALLY, OR SEXUALLY BY ANYONE?: NO

## 2024-06-11 SDOH — SOCIAL STABILITY: SOCIAL INSECURITY: DO YOU FEEL UNSAFE GOING BACK TO THE PLACE WHERE YOU ARE LIVING?: NO

## 2024-06-11 SDOH — SOCIAL STABILITY: SOCIAL INSECURITY: DO YOU FEEL ANYONE HAS EXPLOITED OR TAKEN ADVANTAGE OF YOU FINANCIALLY OR OF YOUR PERSONAL PROPERTY?: NO

## 2024-06-11 SDOH — SOCIAL STABILITY: SOCIAL INSECURITY: ABUSE: ADULT

## 2024-06-11 SDOH — SOCIAL STABILITY: SOCIAL INSECURITY: DOES ANYONE TRY TO KEEP YOU FROM HAVING/CONTACTING OTHER FRIENDS OR DOING THINGS OUTSIDE YOUR HOME?: NO

## 2024-06-11 ASSESSMENT — COGNITIVE AND FUNCTIONAL STATUS - GENERAL
WALKING IN HOSPITAL ROOM: TOTAL
STANDING UP FROM CHAIR USING ARMS: TOTAL
MOBILITY SCORE: 24
DRESSING REGULAR LOWER BODY CLOTHING: TOTAL
DAILY ACTIVITIY SCORE: 24
HELP NEEDED FOR BATHING: TOTAL
MOVING TO AND FROM BED TO CHAIR: TOTAL
MOVING FROM LYING ON BACK TO SITTING ON SIDE OF FLAT BED WITH BEDRAILS: TOTAL
PERSONAL GROOMING: TOTAL
CLIMB 3 TO 5 STEPS WITH RAILING: TOTAL
DAILY ACTIVITIY SCORE: 6
PATIENT BASELINE BEDBOUND: NO
EATING MEALS: TOTAL
TOILETING: TOTAL
TURNING FROM BACK TO SIDE WHILE IN FLAT BAD: TOTAL
MOBILITY SCORE: 6
DRESSING REGULAR UPPER BODY CLOTHING: TOTAL

## 2024-06-11 ASSESSMENT — PATIENT HEALTH QUESTIONNAIRE - PHQ9
SUM OF ALL RESPONSES TO PHQ9 QUESTIONS 1 & 2: 3
1. LITTLE INTEREST OR PLEASURE IN DOING THINGS: NOT AT ALL
2. FEELING DOWN, DEPRESSED OR HOPELESS: NEARLY EVERY DAY

## 2024-06-11 ASSESSMENT — ACTIVITIES OF DAILY LIVING (ADL)
BATHING: INDEPENDENT
TOILETING: INDEPENDENT
JUDGMENT_ADEQUATE_SAFELY_COMPLETE_DAILY_ACTIVITIES: YES
FEEDING YOURSELF: INDEPENDENT
HEARING - RIGHT EAR: FUNCTIONAL
PATIENT'S MEMORY ADEQUATE TO SAFELY COMPLETE DAILY ACTIVITIES?: YES
ADEQUATE_TO_COMPLETE_ADL: YES
HEARING - LEFT EAR: FUNCTIONAL
WALKS IN HOME: INDEPENDENT
GROOMING: INDEPENDENT
LACK_OF_TRANSPORTATION: NO
DRESSING YOURSELF: INDEPENDENT

## 2024-06-11 ASSESSMENT — LIFESTYLE VARIABLES
AUDIT-C TOTAL SCORE: 1
HOW OFTEN DO YOU HAVE A DRINK CONTAINING ALCOHOL: MONTHLY OR LESS
HOW OFTEN DO YOU HAVE 6 OR MORE DRINKS ON ONE OCCASION: NEVER
AUDIT-C TOTAL SCORE: 1
SKIP TO QUESTIONS 9-10: 1
HOW MANY STANDARD DRINKS CONTAINING ALCOHOL DO YOU HAVE ON A TYPICAL DAY: 1 OR 2

## 2024-06-11 ASSESSMENT — PAIN - FUNCTIONAL ASSESSMENT
PAIN_FUNCTIONAL_ASSESSMENT: CPOT (CRITICAL CARE PAIN OBSERVATION TOOL)
PAIN_FUNCTIONAL_ASSESSMENT: CPOT (CRITICAL CARE PAIN OBSERVATION TOOL)
PAIN_FUNCTIONAL_ASSESSMENT: 0-10
PAIN_FUNCTIONAL_ASSESSMENT: CPOT (CRITICAL CARE PAIN OBSERVATION TOOL)
PAIN_FUNCTIONAL_ASSESSMENT: CPOT (CRITICAL CARE PAIN OBSERVATION TOOL)
PAIN_FUNCTIONAL_ASSESSMENT: 0-10
PAIN_FUNCTIONAL_ASSESSMENT: CPOT (CRITICAL CARE PAIN OBSERVATION TOOL)
PAIN_FUNCTIONAL_ASSESSMENT: 0-10

## 2024-06-11 ASSESSMENT — PAIN SCALES - GENERAL
PAINLEVEL_OUTOF10: 0 - NO PAIN

## 2024-06-11 NOTE — CONSULTS
Inpatient consult to Cardiology  Consult performed by: ANDREW Rogers  Consult ordered by: BRE Gong-CNP  Reason for consult: chf                            Date:   6/11/2024  Patient name:  Myrna Calhoun  Date of admission:  6/10/2024  9:40 PM  MRN:   [id number]  YOB: 1955  Time of Consult:  12:18 PM    Consulting Cardiologist: BRE Simms, CNP  Primary Cardiologist:  None  Referring Provider: Dr. Oseguera      Admission Diagnosis:     Unresponsive      History of Present Illness:      69-year-old female with past medical history of bilateral IDC status post bilateral lumpectomy with SLNB and XRT treated with anastrozole, obesity, hypertension, hyperlipidemia, diabetes mellitus type 2 and osteoarthritis who presented to [facility] emergency department early this morning unresponsive.  There is next history and what really happened but seems as though the patient was out with some friends and then drove home and when she got out of the car was walking in the grass and she collapsed.  Unclear whether or not she got dizzy or tripped.  EMS was called and they initially started bag mask ventilations and she was given a total of 6 mg of Narcan with no response.  In the emergency department on arrival her GCS was 3 she was intubated.  She did have nonpurposeful movements in the emergency department.  Initial lab work showed a glucose of 124, potassium with just 2.5 but hemolyzed and repeat came back at 3.0, chloride 116, bicarb 18, albumin 3.1, lactate 4.2, mag 1.69, troponin 8, glucose 124.  Urinalysis negative.  Toxicology screen negative.  She remains intubated and sedated currently.  Carotid ultrasound negative.  CT chest abdomen pelvis showed bilateral airspace opacities dependent portion of the lower chest compatible with pneumonia potentially aspiration and potential gallbladder sludge.  CT head and C-spine negative  for any acute intracranial process.  Echocardiogram was performed which showed mildly decreased LV function with a 45% estimated ejection fraction, trace tricuspid regurgitation.  No wall motion abnormalities were identified.  Bubble study is negative.  The patient was admitted to the ICU intubated and sedated and general cardiology was consulted for decreased EF.  Today the patient is alert and following commands with plans for extubation.    Allergies:     Allergies   Allergen Reactions    Simvastatin Cough         Past Medical History:     Past Medical History:   Diagnosis Date    Adjustment disorder with depressed mood 10/23/2023    Diabetes (Multi)     Hyperlipidemia     Hypertension     Invasive ductal carcinoma of breast, female (Multi) 2016    Bilateral       Past Surgical History:     Past Surgical History:   Procedure Laterality Date    BREAST LUMPECTOMY Bilateral 2016    COLONOSCOPY  10/2023    SENTINEL LYMPH NODE BIOPSY Bilateral 03/2016    TOTAL KNEE ARTHROPLASTY Bilateral     TUBAL LIGATION         Family History:     Family History   Problem Relation Name Age of Onset    No Known Problems Mother      Hypertension Sister         Social History:     Social History     Tobacco Use    Smoking status: Never    Smokeless tobacco: Never   Vaping Use    Vaping status: Never Used   Substance Use Topics    Alcohol use: Yes     Comment: rare, social    Drug use: Never       CURRENT INPATIENT MEDICATIONS    enoxaparin, 40 mg, subcutaneous, q24h  insulin lispro, 0-5 Units, subcutaneous, q4h  lactated Ringer's, 1,000 mL, intravenous, Once  oxygen, , inhalation, Continuous - Inhalation  pantoprazole, 40 mg, intravenous, Daily      dexmedeTOMIDine, 0.1-1.5 mcg/kg/hr, Last Rate: 0.31 mcg/kg/hr (06/11/24 1200)  lactated Ringer's, 125 mL/hr, Last Rate: Stopped (06/11/24 0710)  propofol, 5-50 mcg/kg/min, Last Rate: 15 mcg/kg/min (06/11/24 1130)      Current Outpatient Medications   Medication Instructions    DULoxetine  (CYMBALTA) 60 mg, oral, Daily    ezetimibe (ZETIA) 10 mg, oral, Daily    fluticasone (Flonase) 50 mcg/actuation nasal spray Administer 1 spray into each nostril once daily as needed for allergies.    metFORMIN (GLUCOPHAGE) 500 mg, oral, 2 times daily, With morning and evening meals    metoprolol tartrate (LOPRESSOR) 50 mg, oral, 2 times daily, With meals    traZODone (Desyrel) 50 mg tablet Take 1 tablet (50 mg) by mouth as needed at bedtime.    Vitamin D3 50 mcg (2,000 unit) capsule Take 1 capsule (50 mcg) by mouth early in the morning.. With a meal        Review of Systems:      12 point review of systems was obtained in detail and is negative other than that detailed above.    Vital Signs:     Vitals:    06/11/24 1051 06/11/24 1059 06/11/24 1100 06/11/24 1137   BP: 100/54 99/52  106/61   BP Location:       Patient Position:       Pulse: 84 82 84 86   Resp: 12 (!) 33 26 23   Temp:   36.4 °C (97.5 °F)    TempSrc:   Temporal    SpO2: 99% 98% 98% 98%   Weight:       Height:           Intake/Output Summary (Last 24 hours) at 6/11/2024 1218  Last data filed at 6/11/2024 1100  Gross per 24 hour   Intake 806.54 ml   Output 1415 ml   Net -608.46 ml       Wt Readings from Last 4 Encounters:   06/11/24 109 kg (239 lb 13.8 oz)   02/05/24 107 kg (235 lb)   10/30/23 110 kg (242 lb)   10/24/23 110 kg (242 lb 12.8 oz)       Physical Examination:     Physical Exam  Vitals and nursing note reviewed.   Constitutional:       Appearance: Normal appearance.      Interventions: She is intubated.   HENT:      Head: Normocephalic.      Mouth/Throat:      Mouth: Mucous membranes are moist.      Comments: ETT  Eyes:      Pupils: Pupils are equal, round, and reactive to light.   Cardiovascular:      Rate and Rhythm: Normal rate and regular rhythm.      Heart sounds: Normal heart sounds, S1 normal and S2 normal.      Comments: Occasional PVCs  Pulmonary:      Effort: Pulmonary effort is normal. She is intubated.      Breath sounds: Decreased  air movement present.   Abdominal:      Palpations: Abdomen is soft.   Skin:     General: Skin is warm and dry.      Capillary Refill: Capillary refill takes less than 2 seconds.   Neurological:      Mental Status: She is alert.      Comments: Follows commands   Psychiatric:         Mood and Affect: Mood normal.         Behavior: Behavior is cooperative.           Lab:     CBC:   Results from last 7 days   Lab Units 06/11/24  0354 06/10/24  2147   WBC AUTO x10*3/uL 7.3 6.2   RBC AUTO x10*6/uL 4.17 4.11   HEMOGLOBIN g/dL 13.5 13.3   HEMATOCRIT % 42.8 41.2   MCV fL 103* 100   MCH pg 32.4 32.4   MCHC g/dL 31.5* 32.3   RDW % 13.9 13.7   PLATELETS AUTO x10*3/uL 238 296     CMP:    Results from last 7 days   Lab Units 06/11/24  0354 06/10/24  2238 06/10/24  2147   SODIUM mmol/L 138  --  143   POTASSIUM mmol/L 4.6 3.0* 2.5*   CHLORIDE mmol/L 107  --  116*   CO2 mmol/L 15*  --  18*   BUN mg/dL 19  --  16   CREATININE mg/dL 0.95  --  0.72   GLUCOSE mg/dL 102*  --  124*   PROTEIN TOTAL g/dL 7.2  --  5.5*   CALCIUM mg/dL 8.7  --  6.7*   BILIRUBIN TOTAL mg/dL 1.2  --  0.4   ALK PHOS U/L 48  --  41   AST U/L 59*  --  35   ALT U/L 59*  --  36     BMP:    Results from last 7 days   Lab Units 06/11/24  0354 06/10/24  2238 06/10/24  2147   SODIUM mmol/L 138  --  143   POTASSIUM mmol/L 4.6 3.0* 2.5*   CHLORIDE mmol/L 107  --  116*   CO2 mmol/L 15*  --  18*   BUN mg/dL 19  --  16   CREATININE mg/dL 0.95  --  0.72   CALCIUM mg/dL 8.7  --  6.7*   GLUCOSE mg/dL 102*  --  124*     Magnesium:  Results from last 7 days   Lab Units 06/11/24  0354 06/10/24  2147   MAGNESIUM mg/dL 3.01* 1.69     Troponin:    Results from last 7 days   Lab Units 06/10/24  2238 06/10/24  2147   TROPHS ng/L 8 8     BNP:   Results from last 7 days   Lab Units 06/10/24  2147   BNP pg/mL 144*     Lipid Panel:         Diagnostic Studies:   [place] [id number]   Tel [phone] Fax [phone]     TRANSTHORACIC ECHOCARDIOGRAM  REPORT        Patient Name:      GLADYS PATTERSON      [place] Physician:    [id number] Jeremías Bustos DO  Study Date:        6/11/2024             Ordering Provider:    [id number] NATHANAEL FAN  MRN/PID:           [id number]              Fellow:  Accession#:        [id number]          Nurse:  Date of Birth/Age: 1955 / 69 years   Sonographer:          Tram MASSEY  Gender:            F                     Additional Staff:  Height:            172.72 cm             Admit Date:           6/10/2024  Weight:            108.41 kg             Admission Status:     Inpatient -                                                                 Routine  BSA / BMI:         2.20 m2 / 36.34 kg/m2 Department Location:  [facility]  Blood Pressure: 109 /63 mmHg     Study Type:    TRANSTHORACIC ECHO (TTE) COMPLETE  Diagnosis/ICD: Syncope and collapse-R55  Indication:    UNRESPONSIVE, SYNCOPE AND COLLAPSE  CPT Codes:     Echo Complete w Full [id number]     Patient History:  Diabetes:          Yes  Pertinent History: HTN and Hyperlipidemia.     Study Detail: The following Echo studies were performed: 2D, M-Mode, Doppler and                color flow. Technically challenging study due to poor acoustic                windows, prominent lung artifact, patient lying in supine position                and body habitus. The patient is intubated. Definity used as a                contrast agent for endocardial border definition and agitated                saline used as a contrast agent for intraseptal flow evaluation.                Total contrast used for this procedure was 3 mL via IV push.                Unable to obtain suprasternal notch view. The patient was sedated.        PHYSICIAN INTERPRETATION:  Left Ventricle: Left ventricular  systolic function is mildly decreased, with an estimated ejection fraction of 45%. There are no regional wall motion abnormalities. The left ventricular cavity size is normal. There is mild to moderate concentric left ventricular hypertrophy. Spectral Doppler shows an impaired relaxation pattern of left ventricular diastolic filling.  Left Atrium: The left atrium is normal in size. A bubble study using agitated saline was performed. Bubble study is negative.  Right Ventricle: The right ventricle is normal in size. There is normal right ventricular global systolic function.  Right Atrium: The right atrium is normal in size.  Aortic Valve: The aortic valve appears structurally normal. The aortic valve appears tricuspid. There is no evidence of aortic valve stenosis.  There is no evidence of aortic valve regurgitation. The peak instantaneous gradient of the aortic valve is 10.4 mmHg. The mean gradient of the aortic valve is 6.0 mmHg.  Mitral Valve: The mitral valve is normal in structure. There is no evidence of mitral valve stenosis. There is normal mitral valve leaflet mobility. There is no evidence of mitral valve regurgitation.  Tricuspid Valve: The tricuspid valve is structurally normal. There is normal tricuspid valve leaflet mobility. There is trace tricuspid regurgitation.  Pulmonic Valve: The pulmonic valve is structurally normal. There is no indication of pulmonic valve regurgitation.  Pericardium: There is no pericardial effusion noted.  Aorta: The aortic root is normal.  Pulmonary Artery: The pulmonary artery is not well visualized. The tricuspid regurgitant velocity is 1.75 m/s, and with an estimated right atrial pressure of 3 mmHg, the estimated pulmonary artery pressure is normal with the RVSP at 15.2 mmHg.  Systemic Veins: The inferior vena cava appears to be of normal size.  In comparison to the previous echocardiogram(s): There are no prior studies on this patient for comparison purposes.         CONCLUSIONS:   1. Left ventricular systolic function is mildly decreased with a 45% estimated ejection fraction.   2. Spectral Doppler shows an impaired relaxation pattern of left ventricular diastolic filling.   3. There is no evidence of mitral valve stenosis.   4. No evidence of mitral valve regurgitation.   5. Trace tricuspid regurgitation is visualized.   6. Aortic valve stenosis is not present.   7. The pulmonary artery is not well visualized.    Carotid duplex bilateral    Result Date: 6/11/2024  Interpreted By:  Ru Jackson, STUDY: [facility] CAROTID ARTERY DUPLEX BILATERAL;  6/11/2024 8:15 am   INDICATION: Signs/Symptoms:syncope & collapse.   COMPARISON: None.   ACCESSION NUMBER(S): [id number]   ORDERING CLINICIAN: NATHANAEL FAN   TECHNIQUE: Vascular ultrasound of the extracranial carotid system was performed bilaterally.  Gray scale, color Doppler and spectral Doppler waveform analysis was performed.   FINDINGS: Mild atherosclerotic disease.   RIGHT:   RIGHT SIDE PEAK SYSTOLIC VELOCITY TABLE: CCA  108 cm/s. ICA  118 cm/s. ECA  140 cm/s.     The ratio of the peak systolic velocity of the right ICA/CCA is  1.09.   RIGHT VERTEBRAL ARTERY: The right vertebral artery demonstrates proximal anterograde flow.   LEFT:   LEFT SIDE PEAK SYSTOLIC VELOCITY TABLE: CCA 97 cm/s.  cm/s.  cm/s.     The ratio of the peak systolic velocity of the left ICA/CCA is 1.26.   LEFT VERTEBRAL ARTERY: The left vertebral artery demonstrates proximal anterograde flow.       Utilizing the peak systolic velocities, the estimated degree of stenosis within the internal carotid arteries is less than 50% bilaterally.   MACRO: None.     Signed by: Ru Jackson 6/11/2024 10:51 AM Dictation workstation:   [id number]    ECG 12 lead    Result Date: 6/11/2024  Sinus rhythm with frequent Premature ventricular complexes Possible Left atrial enlargement Nonspecific T wave abnormality Prolonged QT Abnormal ECG When compared with ECG of  10-WOLF-2024 21:55, (unconfirmed) No significant change was found See ED provider note for full interpretation and clinical correlation    ECG 12 lead    Result Date: 6/11/2024  Sinus rhythm with frequent Premature ventricular complexes Biatrial enlargement Nonspecific T wave abnormality Prolonged QT Abnormal ECG When compared with ECG of 06-FEB-2024 00:13, Nonspecific T wave abnormality, worse in Lateral leads    CT head wo IV contrast    Result Date: 6/10/2024  Interpreted By:  Finkelstein, Evan, STUDY: CT HEAD WO IV CONTRAST; CT CERVICAL SPINE WO IV CONTRAST;  6/10/2024 10:27 pm   INDICATION: Signs/Symptoms:ams, ?fall.   COMPARISON: None.   ACCESSION NUMBER(S): [id number]; [id number]   ORDERING CLINICIAN: ALESSIA CHAHAL   TECHNIQUE: Noncontrast CT images of the head with coronal and sagittal reconstructions. Axial noncontrast CT images of the cervical spine with coronal and sagittal reconstructed images.   FINDINGS: EXTRACRANIAL SOFT TISSUES: Unremarkable.   CALVARIUM: No depressed skull fracture. No destructive osseous lesion.   PARANASAL SINUSES/MASTOIDS: Fluid levels in the maxillary sinuses bilaterally. Mastoid air cells are aerated.   HEMORRHAGE: No acute intracranial hemorrhage.   BRAIN PARENCHYMA: Gray-white matter interfaces are preserved. No mass effect or midline shift. There are nonspecific scattered white matter hypodensities.   VENTRICLES and EXTRA-AXIAL SPACES: Normal size.   OTHER FINDINGS: None.   CERVICAL SPINE:   ALIGNMENT: Reversal of the normal cervical lordosis, which may be positional or related to spasm. VERTEBRAE: No acute loss of vertebral body height. DISC SPACE: Disc space narrowing C5/C6 SPINAL CANAL: Multilevel facet and uncovertebral arthropathy with varying degrees of neural foraminal stenosis. Prominent posterior disc osteophyte complex at C5/C6 and likely ossification along the posterior longitudinal ligament contributes to severe central narrowing. PREVERTEBRAL SOFT TISSUES:  No prevertebral soft tissue swelling. LUNG APICES: Partially visualized endotracheal and gastric tube.   OTHER FINDINGS: None.       No acute intracranial hemorrhage, mass effect or midline shift.   Nonspecific scattered white matter hypodensities favored to represent sequela of small vessel ischemia. Fluid levels in the maxillary sinuses bilaterally. Correlate for symptoms of sinusitis.   Cervical spondylosis without acute loss of vertebral body height or traumatic malalignment. Prominent posterior disc osteophyte complex at C5/C6 and likely ossification along the posterior longitudinal ligament contributes to severe central narrowing at this level.     MACRO: None.   Signed by: Evan Finkelstein 6/10/2024 11:36 PM Dictation workstation:   [id number]    CT cervical spine wo IV contrast    Result Date: 6/10/2024  Interpreted By:  Finkelstein, Evan, STUDY: CT HEAD WO IV CONTRAST; CT CERVICAL SPINE WO IV CONTRAST;  6/10/2024 10:27 pm   INDICATION: Signs/Symptoms:ams, ?fall.   COMPARISON: None.   ACCESSION NUMBER(S): [id number]; [id number]   ORDERING CLINICIAN: ALESSIA CHAHAL   TECHNIQUE: Noncontrast CT images of the head with coronal and sagittal reconstructions. Axial noncontrast CT images of the cervical spine with coronal and sagittal reconstructed images.   FINDINGS: EXTRACRANIAL SOFT TISSUES: Unremarkable.   CALVARIUM: No depressed skull fracture. No destructive osseous lesion.   PARANASAL SINUSES/MASTOIDS: Fluid levels in the maxillary sinuses bilaterally. Mastoid air cells are aerated.   HEMORRHAGE: No acute intracranial hemorrhage.   BRAIN PARENCHYMA: Gray-white matter interfaces are preserved. No mass effect or midline shift. There are nonspecific scattered white matter hypodensities.   VENTRICLES and EXTRA-AXIAL SPACES: Normal size.   OTHER FINDINGS: None.   CERVICAL SPINE:   ALIGNMENT: Reversal of the normal cervical lordosis, which may be positional or related to spasm. VERTEBRAE: No acute loss of  vertebral body height. DISC SPACE: Disc space narrowing C5/C6 SPINAL CANAL: Multilevel facet and uncovertebral arthropathy with varying degrees of neural foraminal stenosis. Prominent posterior disc osteophyte complex at C5/C6 and likely ossification along the posterior longitudinal ligament contributes to severe central narrowing. PREVERTEBRAL SOFT TISSUES: No prevertebral soft tissue swelling. LUNG APICES: Partially visualized endotracheal and gastric tube.   OTHER FINDINGS: None.       No acute intracranial hemorrhage, mass effect or midline shift.   Nonspecific scattered white matter hypodensities favored to represent sequela of small vessel ischemia. Fluid levels in the maxillary sinuses bilaterally. Correlate for symptoms of sinusitis.   Cervical spondylosis without acute loss of vertebral body height or traumatic malalignment. Prominent posterior disc osteophyte complex at C5/C6 and likely ossification along the posterior longitudinal ligament contributes to severe central narrowing at this level.     MACRO: None.   Signed by: Evan Finkelstein 6/10/2024 11:36 PM Dictation workstation:   [id number]    XR chest 1 view    Result Date: 6/10/2024  Interpreted By:  Mundo Arroyo, STUDY: XR CHEST 1 VIEW;  6/10/2024 9:51 pm   INDICATION: Signs/Symptoms:Intubation.   COMPARISON: 11/20/2020.   ACCESSION NUMBER(S): [id number]   ORDERING CLINICIAN: ALESSIA CHAHAL   FINDINGS: AP supine radiograph of the chest was provided.   Leads and pacer pad overlie the chest, partially obscuring the field-of-view.   Relation of the endotracheal tube to the patrice is difficult to assess but endotracheal tube appears likely low lying, suspected to reside roughly 1.8 cm above the patrice and retraction by roughly 2 cm suggested for optimal placement. Enteric tube approximates the esophagus and extends inferior to the diaphragm and extends beyond the inferior border of the field of view, with tip and side-port not seen.    CARDIOMEDIASTINAL SILHOUETTE: Cardiomediastinal silhouette is normal in size and configuration.   LUNGS: Lungs appear clear. No sizable pleural effusion. No pneumothorax.   ABDOMEN: No remarkable upper abdominal findings.   BONES: No acute osseous changes.       1.  No definite evidence of acute cardiopulmonary process. 2. Relation of the endotracheal tube to the patrice is difficult to assess but endotracheal tube appears likely low lying, suspected to reside roughly 1.8 cm above the patrice and retraction by roughly 2 cm suggested for optimal placement. Enteric tube approximates the esophagus and extends inferior to the diaphragm and extends beyond the inferior border of the field of view, with tip and side-port not seen.       MACRO: None   Signed by: Mundo Arroyo 6/10/2024 10:27 PM Dictation workstation:   [id number]        Radiology:     Transthoracic Echo (TTE) Complete   Final Result      Carotid duplex bilateral   Final Result   Utilizing the peak systolic velocities, the estimated degree of   stenosis within the internal carotid arteries is less than 50%   bilaterally.        MACRO:   None.             Signed by: Ru Jackson 6/11/2024 10:51 AM   Dictation workstation:   [id number]      CT head wo IV contrast   Final Result   No acute intracranial hemorrhage, mass effect or midline shift.        Nonspecific scattered white matter hypodensities favored to represent   sequela of small vessel ischemia. Fluid levels in the maxillary   sinuses bilaterally. Correlate for symptoms of sinusitis.        Cervical spondylosis without acute loss of vertebral body height or   traumatic malalignment. Prominent posterior disc osteophyte complex   at C5/C6 and likely ossification along the posterior longitudinal   ligament contributes to severe central narrowing at this level.             MACRO:   None.        Signed by: Evan Finkelstein 6/10/2024 11:36 PM   Dictation workstation:   [id number]      CT cervical spine wo IV  contrast   Final Result   No acute intracranial hemorrhage, mass effect or midline shift.        Nonspecific scattered white matter hypodensities favored to represent   sequela of small vessel ischemia. Fluid levels in the maxillary   sinuses bilaterally. Correlate for symptoms of sinusitis.        Cervical spondylosis without acute loss of vertebral body height or   traumatic malalignment. Prominent posterior disc osteophyte complex   at C5/C6 and likely ossification along the posterior longitudinal   ligament contributes to severe central narrowing at this level.             MACRO:   None.        Signed by: Evan Finkelstein 6/10/2024 11:36 PM   Dictation workstation:   [id number]      XR chest 1 view   Final Result   1.  No definite evidence of acute cardiopulmonary process.   2. Relation of the endotracheal tube to the patrice is difficult to   assess but endotracheal tube appears likely low lying, suspected to   reside roughly 1.8 cm above the patrice and retraction by roughly 2 cm   suggested for optimal placement. Enteric tube approximates the   esophagus and extends inferior to the diaphragm and extends beyond   the inferior border of the field of view, with tip and side-port not   seen.                  MACRO:   None        Signed by: Mundo Arroyo 6/10/2024 10:27 PM   Dictation workstation:   [id number]      XR chest 1 view    (Results Pending)   CT chest abdomen pelvis w IV contrast    (Results Pending)       Problem List:     Patient Active Problem List   Diagnosis    CMC arthritis    Adjustment disorder with depressed mood    Essential hypertension    Insomnia    Colon cancer screening    Unresponsive       Assessment:   Unresponsive  Hypertension  EtOH  Hyperlipidemia  Elevated lactate  Acute diastolic congestive heart failure      Plan:   Admitted to ICU, remains intubated and sedated.  Does wake up and follows simple commands appropriately.  Purposeful.  Pupils equal and reactive.  May need to rule out  seizure and get neurology workup.  Remains on telemetry.  Telemetry shows normal sinus rhythm with occasional PVCs.  Continue supplemental O2, keep SpO2 greater than 92%.  Plans for extubation today.  Monitor electrolytes, keep potassium greater than 4 and magnesium greater than 2  Gentle hydration, monitor strict I's and O's and daily weights  Echocardiogram shows slightly decreased LV function with an EF of 45% and mild tricuspid regurgitation.  No wall motion abnormalities were identified.  Low suspicion for ACS with negative troponins however I am concerned with the amount of PVCs seen on EKG dating back to February of this year.  I think we need to proceed with left heart catheterization to fully rule out any type of cardiac event.  Discussed with family.  Patient's family is agreeable to proceed with left heart catheterization.  N.p.o.  Plan for LHC tomorrow  If LHC is negative we will most likely DC with LifeVest versus Holter monitor on discharge.  Continue aspirin, statin and beta-blocker for now.  Check lipid panel.  Will eventually need GDMT for heart failure, will initiate slowly  Further recommendations to follow      Masood Corral [facility]  Adult Gerontology Acute Care Nurse Practitioner  [facility] [place]   [facility]  [phone]      Of note, this documentation is completed using the Dragon Dictation system (voice recognition software). There may be spelling and/or grammatical errors that were not corrected prior to final submission.      Electronically signed by ANDREW Rogers, on 6/11/2024 at 12:18 PM     Patient seen and examined in conjunction with BRE Logan/CNP and agree with the evaluation as noted above.  69-year-old lady with a history of bilateral breast cancer s/p lumpectomy and radiation therapy and chemotherapy, hypertension dyslipidemia diabetes who was admitted with an episode of unresponsiveness.  She  apparently drove home and was she got out of her car, she collapsed and was found unresponsive.  She was bagged and given some Narcan with no response and she was eventually intubated.  She had a quick turnaround and is currently extubated and awake and alert.  She had a relatively low potassium of 2.5 on hemolyzed blood as well as borderline low magnesium.  Alcohol level was noted to be elevated she denied any excessive alcohol consumption recently.  She also denied any recreational drug use.  She has no prior history of coronary artery disease.    Echocardiogram this admission did not show moderate LV dysfunction with EF of 45% with no gross valvular abnormalities.  EKG shows sinus rhythm with frequent PVCs, she also has frequent PVCs on the telemetry.    Agree with exam as noted above cardiac exam reveals regular first and second heart sound, there were no murmurs appreciated.  Chest reveals reduced breath sounds at the bases bilaterally.    ASSESSMENT AND PLAN:  1.  Sudden unresponsiveness: Etiology is unclear at this time whether she had a primary cardiac event.  However it is concerning that she has frequent PVCs on EKG dating back to February 2024.  In view of LV dysfunction and this finding, will recommend cardiac catheterization to rule out any significant obstructive CAD rule out ischemia as underlying substrate for her episodes.  Will make further recommendations based on the findings.  2.  Hypertension: Will continue to optimize blood pressure control.  3.  Frequent PVCs: If the cardiac catheterization is normal, we will consider EP evaluation as she may need further EP study to see if she has any inducible ventricular arrhythmias  AKA

## 2024-06-11 NOTE — PROGRESS NOTES
Respiratory Therapy Note  Weaning parameters at this time, patient did well and was extubated to 3LPM nasal cannula with voice intact. Tory MASON at bedside.

## 2024-06-11 NOTE — PROGRESS NOTES
[facility] Critical Care Medicine       Date:  6/11/2024  Patient:  Myrna Calhoun  YOB: 1955  MRN:  [id number]   Admit Date:  6/10/2024  ========================================================================================================    Chief Complaint   Patient presents with    Altered Mental Status     Unresponsive +ETOH. Unknown drug use. Bagged by EMS en route and upon arrival to ER pt still unresponsive. 2mg narcan IN given by EMS. 4mg narcan IM given by EMS.        History of Present Illness:  Myrna Calhoun is a 69 y.o. 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  Currently EMS report is not available.  Information in this H&P obtained from ED provider and family as patient is currently intubated and sedated.  According to patient's sister and niece, they were just in [place] with her 1 day PTA in [place] and patient seemed in her normal state of health.  They said patient never complained of any ailments or recent acute illness.  The story that transpired prior to patient's incident is not entirely clear but it looks like patient may have been out with some friends and she drove home and when she got out of her car and was walking in her grass she collapsed.  It is unclear if patient had a syncopal episode and then collapsed.  Patient was being bagged by EMS on route and on arrival, also given total 6mg Narcan with no response.  ED provider said on arrival GCS was 3 so patient was intubated.  She did have some nonpurposeful movements so she was placed on propofol.  Patient does have 2 sons, no other children, she is not .  As far as we are aware there is no advanced directives or healthcare power of .     Workup is essentially unremarkable aside from hypokalemia and none AG metabolic acidosis.   EtOH level +167, urine drug screen negative, initial head CT and cervical spine CT shows nothing acute, initial chest x-ray shows no acute cardiopulmonary process, no evidence of infection and UA.        Interval ICU Events:  6/10: Admitted to ICU on mechanical ventilator, patient is hemodynamically stable not requiring any vasopressor support.  Patient is throwing a lot of PVCs including bigeminy and trigeminy, currently potassium and magnesium is infusing.  Patient is only on 25 mics of propofol.  Workup ongoing to figure out why this patient went from being in his normal state of health to unresponsive.    6/11: Continued on ventilator management, sedated with propofol and precedex. Pending CT chest , abdomen and pelvis       Medical History:  Past Medical History:   Diagnosis Date    Adjustment disorder with depressed mood 10/23/2023    Diabetes (Multi)     Hyperlipidemia     Hypertension     Invasive ductal carcinoma of breast, female (Multi) 2016    Bilateral     Past Surgical History:   Procedure Laterality Date    BREAST LUMPECTOMY Bilateral 2016    COLONOSCOPY  10/2023    SENTINEL LYMPH NODE BIOPSY Bilateral 03/2016    TOTAL KNEE ARTHROPLASTY Bilateral     TUBAL LIGATION       Medications Prior to Admission   Medication Sig Dispense Refill Last Dose    DULoxetine (Cymbalta) 60 mg DR capsule Take 1 capsule (60 mg) by mouth once daily.   Unknown    ezetimibe (Zetia) 10 mg tablet Take 1 tablet (10 mg) by mouth once daily.   Unknown    fluticasone (Flonase) 50 mcg/actuation nasal spray Administer 1 spray into each nostril once daily as needed for allergies.   Unknown    metFORMIN (Glucophage) 500 mg tablet Take 1 tablet (500 mg) by mouth 2 times a day. With morning and evening meals   Unknown    metoprolol tartrate (Lopressor) 50 mg tablet Take 1 tablet by mouth 2 times a day. With meals   Unknown    traZODone (Desyrel) 50 mg tablet Take 1 tablet (50 mg) by mouth as needed at bedtime.   Unknown    Vitamin D3 50  "mcg (2,000 unit) capsule Take 1 capsule (50 mcg) by mouth early in the morning.. With a meal   Unknown     Simvastatin  Social History     Tobacco Use    Smoking status: Never    Smokeless tobacco: Never   Vaping Use    Vaping status: Never Used   Substance Use Topics    Alcohol use: Yes     Comment: rare, social    Drug use: Never     Family History   Problem Relation Name Age of Onset    No Known Problems Mother      Hypertension Sister         Review of Systems:  14 point review of systems was completed and negative except for those specially mention in my HPI    Physical Exam:    Heart Rate:  []   Temp:  [35.3 °C (95.5 °F)-36.5 °C (97.7 °F)]   Resp:  [0-33]   BP: ()/()   Height:  [172.7 cm (5' 7.99\")-172.7 cm (5' 8\")]   Weight:  [107 kg (235 lb)-109 kg (239 lb 13.8 oz)]   SpO2:  [93 %-100 %]     Physical Exam  Constitutional:       Appearance: She is overweight.      Interventions: She is sedated and intubated.   HENT:      Head: Normocephalic and atraumatic.      Mouth/Throat:      Mouth: Mucous membranes are dry.   Cardiovascular:      Rate and Rhythm: Normal rate and regular rhythm.      Pulses: Normal pulses.      Heart sounds: Normal heart sounds. Heart sounds not distant. No murmur heard.     No friction rub. No gallop.   Pulmonary:      Effort: Pulmonary effort is normal. She is intubated.      Breath sounds: Normal breath sounds. No stridor, decreased air movement or transmitted upper airway sounds. No wheezing or rhonchi.   Abdominal:      General: Abdomen is protuberant. Bowel sounds are normal.      Palpations: Abdomen is soft.   Musculoskeletal:      Cervical back: Full passive range of motion without pain.   Skin:     General: Skin is warm.      Capillary Refill: Capillary refill takes less than 2 seconds.   Neurological:      Mental Status: She is easily aroused.      Comments: Off sedation, following commands          Objective:  Carotid duplex bilateral    Result Date: " 6/11/2024  Interpreted By:  Ru Jackson, STUDY: [facility] US CAROTID ARTERY DUPLEX BILATERAL;  6/11/2024 8:15 am   INDICATION: Signs/Symptoms:syncope & collapse.   COMPARISON: None.   ACCESSION NUMBER(S): [id number]   ORDERING CLINICIAN: NATHANAEL FAN   TECHNIQUE: Vascular ultrasound of the extracranial carotid system was performed bilaterally.  Gray scale, color Doppler and spectral Doppler waveform analysis was performed.   FINDINGS: Mild atherosclerotic disease.   RIGHT:   RIGHT SIDE PEAK SYSTOLIC VELOCITY TABLE: CCA  108 cm/s. ICA  118 cm/s. ECA  140 cm/s.     The ratio of the peak systolic velocity of the right ICA/CCA is  1.09.   RIGHT VERTEBRAL ARTERY: The right vertebral artery demonstrates proximal anterograde flow.   LEFT:   LEFT SIDE PEAK SYSTOLIC VELOCITY TABLE: CCA 97 cm/s.  cm/s.  cm/s.     The ratio of the peak systolic velocity of the left ICA/CCA is 1.26.   LEFT VERTEBRAL ARTERY: The left vertebral artery demonstrates proximal anterograde flow.       Utilizing the peak systolic velocities, the estimated degree of stenosis within the internal carotid arteries is less than 50% bilaterally.   MACRO: None.     Signed by: Ru Jackson 6/11/2024 10:51 AM Dictation workstation:   [id number]    Transthoracic Echo (TTE) Complete    Result Date: 6/11/2024          [place] [id number]  Tel [phone] Fax [phone] TRANSTHORACIC ECHOCARDIOGRAM REPORT  Patient Name:      GLADYS Chacko Physician:    [id number] Jeremías Bustos DO Study Date:        6/11/2024             Ordering Provider:    [id number] NATHANAEL FAN MRN/PID:           [id number]              Fellow: Accession#:        [id number]          Nurse: Date of Birth/Age: 1955 / 69 years   Sonographer:          Tram Alfaro                                                                 [facility] Gender:            F                     Additional Staff: Height:            172.72 cm             Admit Date:           6/10/2024 Weight:            108.41 kg             Admission Status:     Inpatient -                                                                Routine BSA / BMI:         2.20 m2 / 36.34 kg/m2 Department Location:  [facility] Blood Pressure: 109 /63 mmHg Study Type:    TRANSTHORACIC ECHO (TTE) COMPLETE Diagnosis/ICD: Syncope and collapse-R55 Indication:    UNRESPONSIVE, SYNCOPE AND COLLAPSE CPT Codes:     Echo Complete w Full [id number] Patient History: Diabetes:          Yes Pertinent History: HTN and Hyperlipidemia. Study Detail: The following Echo studies were performed: 2D, M-Mode, Doppler and               color flow. Technically challenging study due to poor acoustic               windows, prominent lung artifact, patient lying in supine position               and body habitus. The patient is intubated. Definity used as a               contrast agent for endocardial border definition and agitated               saline used as a contrast agent for intraseptal flow evaluation.               Total contrast used for this procedure was 3 mL via IV push.               Unable to obtain suprasternal notch view. The patient was sedated.  PHYSICIAN INTERPRETATION: Left Ventricle: Left ventricular systolic function is mildly decreased, with an estimated ejection fraction of 45%. There are no regional wall motion abnormalities. The left ventricular cavity size is normal. There is mild to moderate concentric left ventricular hypertrophy. Spectral Doppler shows an impaired relaxation pattern of left ventricular diastolic filling. Left Atrium: The left atrium is normal in size. A bubble study using agitated saline was performed. Bubble study is negative. Right Ventricle: The right ventricle is normal in size. There is normal right ventricular global systolic function.  Right Atrium: The right atrium is normal in size. Aortic Valve: The aortic valve appears structurally normal. The aortic valve appears tricuspid. There is no evidence of aortic valve stenosis. There is no evidence of aortic valve regurgitation. The peak instantaneous gradient of the aortic valve is 10.4 mmHg. The mean gradient of the aortic valve is 6.0 mmHg. Mitral Valve: The mitral valve is normal in structure. There is no evidence of mitral valve stenosis. There is normal mitral valve leaflet mobility. There is no evidence of mitral valve regurgitation. Tricuspid Valve: The tricuspid valve is structurally normal. There is normal tricuspid valve leaflet mobility. There is trace tricuspid regurgitation. Pulmonic Valve: The pulmonic valve is structurally normal. There is no indication of pulmonic valve regurgitation. Pericardium: There is no pericardial effusion noted. Aorta: The aortic root is normal. Pulmonary Artery: The pulmonary artery is not well visualized. The tricuspid regurgitant velocity is 1.75 m/s, and with an estimated right atrial pressure of 3 mmHg, the estimated pulmonary artery pressure is normal with the RVSP at 15.2 mmHg. Systemic Veins: The inferior vena cava appears to be of normal size. In comparison to the previous echocardiogram(s): There are no prior studies on this patient for comparison purposes.  CONCLUSIONS:  1. Left ventricular systolic function is mildly decreased with a 45% estimated ejection fraction.  2. Spectral Doppler shows an impaired relaxation pattern of left ventricular diastolic filling.  3. There is no evidence of mitral valve stenosis.  4. No evidence of mitral valve regurgitation.  5. Trace tricuspid regurgitation is visualized.  6. Aortic valve stenosis is not present.  7. The pulmonary artery is not well visualized. RECOMMENDATIONS: Technically suboptimal and limited study, therefore accuracy of above interpretation could be substantially diminished. Clinical  correlation is advised. Consider additional imaging modalities if clinically indicated. Transthoracic echo has low negative predictive value for mass, vegetation, or embolic source. Consider PALAK or MRI if clinically indicated.  QUANTITATIVE DATA SUMMARY: 2D MEASUREMENTS:                           Normal Ranges: Ao Root d:     3.10 cm    (2.0-3.7cm) LAs:           2.70 cm    (2.7-4.0cm) IVSd:          1.30 cm    (0.6-1.1cm) LVPWd:         1.30 cm    (0.6-1.1cm) LVIDd:         5.20 cm    (3.9-5.9cm) LVIDs:         4.00 cm LV Mass Index: 126.3 g/m2 LV % FS        23.1 % LA VOLUME:                               Normal Ranges: LA Vol A4C:        55.2 ml    (22+/-6mL/m2) LA Vol A2C:        61.4 ml LA Vol BP:         58.7 ml LA Vol Index A4C:  25.0ml/m2 LA Vol Index A2C:  27.8 ml/m2 LA Vol Index BP:   26.6 ml/m2 LA Area A4C:       19.6 cm2 LA Area A2C:       20.5 cm2 LA Major Axis A4C: 5.9 cm LA Major Axis A2C: 5.8 cm LA Volume Index:   20.8 ml/m2 RA VOLUME BY A/L METHOD:                               Normal Ranges: RA Vol A4C:        45.0 ml    (8.3-19.5ml) RA Vol Index A4C:  20.4 ml/m2 RA Area A4C:       16.6 cm2 RA Major Axis A4C: 5.2 cm LV SYSTOLIC FUNCTION BY 2D PLANIMETRY (MOD):                     Normal Ranges: EF-A4C View: 43.1 % (>=55%) EF-A2C View: 45.9 % EF-Biplane:  45.3 % LV DIASTOLIC FUNCTION:                               Normal Ranges: MV Peak E:        1.08 m/s    (0.7-1.2 m/s) MV Peak A:        1.11 m/s    (0.42-0.7 m/s) E/A Ratio:        0.97        (1.0-2.2) MV e'             0.14 m/s    (>8.0) MV lateral e'     0.08 m/s MV medial e'      0.11 m/s E/e' Ratio:       7.88        (<8.0) PulmV Sys Tex:    50.60 cm/s PulmV Flynn Tex:   56.80 cm/s PulmV S/D Tex:    0.90 PulmV A Revs Tex: 40.30 cm/s PulmV A Revs Dur: 137.00 msec MITRAL VALVE:                 Normal Ranges: MV DT: 224 msec (150-240msec) AORTIC VALVE:                                    Normal Ranges: AoV Vmax:                1.61 m/s  (<=1.7m/s)  AoV Peak PG:             10.4 mmHg (<20mmHg) AoV Mean P.0 mmHg  (1.7-11.5mmHg) LVOT Max Tex:            1.27 m/s  (<=1.1m/s) AoV VTI:                 24.00 cm  (18-25cm) LVOT VTI:                19.50 cm LVOT Diameter:           2.00 cm   (1.8-2.4cm) AoV Area, VTI:           2.55 cm2  (2.5-5.5cm2) AoV Area,Vmax:           2.48 cm2  (2.5-4.5cm2) AoV Dimensionless Index: 0.81  RIGHT VENTRICLE: RV Basal 5.10 cm RV Mid   3.50 cm RV Major 9.5 cm TAPSE:   22.3 mm RV s'    0.13 m/s TRICUSPID VALVE/RVSP:                             Normal Ranges: Peak TR Velocity: 1.75 m/s RV Syst Pressure: 15.2 mmHg (< 30mmHg) IVC Diam:         1.40 cm PULMONIC VALVE:                         Normal Ranges: PV Accel Time: 71 msec  (>120ms) PV Max Tex:    1.1 m/s  (0.6-0.9m/s) PV Max P.6 mmHg Pulmonary Veins: PulmV A Revs Dur: 137.00 msec PulmV A Revs Tex: 40.30 cm/s PulmV Flynn Tex:   56.80 cm/s PulmV S/D Tex:    0.90 PulmV Sys Tex:    50.60 cm/s  [id number] Jeremías Bustos DO Electronically signed on 2024 at 10:51:31 AM  ** Final **     ECG 12 lead    Result Date: 2024  Sinus rhythm with frequent Premature ventricular complexes Possible Left atrial enlargement Nonspecific T wave abnormality Prolonged QT Abnormal ECG When compared with ECG of 10-WOLF-2024 21:55, (unconfirmed) No significant change was found See ED provider note for full interpretation and clinical correlation    ECG 12 lead    Result Date: 2024  Sinus rhythm with frequent Premature ventricular complexes Biatrial enlargement Nonspecific T wave abnormality Prolonged QT Abnormal ECG When compared with ECG of 2024 00:13, Nonspecific T wave abnormality, worse in Lateral leads    CT head wo IV contrast    Result Date: 6/10/2024  Interpreted By:  Finkelstein, Evan, STUDY: CT HEAD WO IV CONTRAST; CT CERVICAL SPINE WO IV CONTRAST;  6/10/2024 10:27 pm   INDICATION: Signs/Symptoms:ams, ?fall.   COMPARISON: None.   ACCESSION NUMBER(S):  [id number]; [id number]   ORDERING CLINICIAN: ALESSIA CHAHAL   TECHNIQUE: Noncontrast CT images of the head with coronal and sagittal reconstructions. Axial noncontrast CT images of the cervical spine with coronal and sagittal reconstructed images.   FINDINGS: EXTRACRANIAL SOFT TISSUES: Unremarkable.   CALVARIUM: No depressed skull fracture. No destructive osseous lesion.   PARANASAL SINUSES/MASTOIDS: Fluid levels in the maxillary sinuses bilaterally. Mastoid air cells are aerated.   HEMORRHAGE: No acute intracranial hemorrhage.   BRAIN PARENCHYMA: Gray-white matter interfaces are preserved. No mass effect or midline shift. There are nonspecific scattered white matter hypodensities.   VENTRICLES and EXTRA-AXIAL SPACES: Normal size.   OTHER FINDINGS: None.   CERVICAL SPINE:   ALIGNMENT: Reversal of the normal cervical lordosis, which may be positional or related to spasm. VERTEBRAE: No acute loss of vertebral body height. DISC SPACE: Disc space narrowing C5/C6 SPINAL CANAL: Multilevel facet and uncovertebral arthropathy with varying degrees of neural foraminal stenosis. Prominent posterior disc osteophyte complex at C5/C6 and likely ossification along the posterior longitudinal ligament contributes to severe central narrowing. PREVERTEBRAL SOFT TISSUES: No prevertebral soft tissue swelling. LUNG APICES: Partially visualized endotracheal and gastric tube.   OTHER FINDINGS: None.       No acute intracranial hemorrhage, mass effect or midline shift.   Nonspecific scattered white matter hypodensities favored to represent sequela of small vessel ischemia. Fluid levels in the maxillary sinuses bilaterally. Correlate for symptoms of sinusitis.   Cervical spondylosis without acute loss of vertebral body height or traumatic malalignment. Prominent posterior disc osteophyte complex at C5/C6 and likely ossification along the posterior longitudinal ligament contributes to severe central narrowing at this level.     MACRO:  None.   Signed by: Evan Finkelstein 6/10/2024 11:36 PM Dictation workstation:   [id number]    CT cervical spine wo IV contrast    Result Date: 6/10/2024  Interpreted By:  Finkelstein, Evan, STUDY: CT HEAD WO IV CONTRAST; CT CERVICAL SPINE WO IV CONTRAST;  6/10/2024 10:27 pm   INDICATION: Signs/Symptoms:ams, ?fall.   COMPARISON: None.   ACCESSION NUMBER(S): [id number]; [id number]   ORDERING CLINICIAN: ALESSIA CHAHAL   TECHNIQUE: Noncontrast CT images of the head with coronal and sagittal reconstructions. Axial noncontrast CT images of the cervical spine with coronal and sagittal reconstructed images.   FINDINGS: EXTRACRANIAL SOFT TISSUES: Unremarkable.   CALVARIUM: No depressed skull fracture. No destructive osseous lesion.   PARANASAL SINUSES/MASTOIDS: Fluid levels in the maxillary sinuses bilaterally. Mastoid air cells are aerated.   HEMORRHAGE: No acute intracranial hemorrhage.   BRAIN PARENCHYMA: Gray-white matter interfaces are preserved. No mass effect or midline shift. There are nonspecific scattered white matter hypodensities.   VENTRICLES and EXTRA-AXIAL SPACES: Normal size.   OTHER FINDINGS: None.   CERVICAL SPINE:   ALIGNMENT: Reversal of the normal cervical lordosis, which may be positional or related to spasm. VERTEBRAE: No acute loss of vertebral body height. DISC SPACE: Disc space narrowing C5/C6 SPINAL CANAL: Multilevel facet and uncovertebral arthropathy with varying degrees of neural foraminal stenosis. Prominent posterior disc osteophyte complex at C5/C6 and likely ossification along the posterior longitudinal ligament contributes to severe central narrowing. PREVERTEBRAL SOFT TISSUES: No prevertebral soft tissue swelling. LUNG APICES: Partially visualized endotracheal and gastric tube.   OTHER FINDINGS: None.       No acute intracranial hemorrhage, mass effect or midline shift.   Nonspecific scattered white matter hypodensities favored to represent sequela of small vessel ischemia. Fluid  levels in the maxillary sinuses bilaterally. Correlate for symptoms of sinusitis.   Cervical spondylosis without acute loss of vertebral body height or traumatic malalignment. Prominent posterior disc osteophyte complex at C5/C6 and likely ossification along the posterior longitudinal ligament contributes to severe central narrowing at this level.     MACRO: None.   Signed by: Evan Finkelstein 6/10/2024 11:36 PM Dictation workstation:   [id number]    XR chest 1 view    Result Date: 6/10/2024  Interpreted By:  Mundo Arroyo, STUDY: XR CHEST 1 VIEW;  6/10/2024 9:51 pm   INDICATION: Signs/Symptoms:Intubation.   COMPARISON: 11/20/2020.   ACCESSION NUMBER(S): [id number]   ORDERING CLINICIAN: ALESSIA CHAHAL   FINDINGS: AP supine radiograph of the chest was provided.   Leads and pacer pad overlie the chest, partially obscuring the field-of-view.   Relation of the endotracheal tube to the patrice is difficult to assess but endotracheal tube appears likely low lying, suspected to reside roughly 1.8 cm above the patrice and retraction by roughly 2 cm suggested for optimal placement. Enteric tube approximates the esophagus and extends inferior to the diaphragm and extends beyond the inferior border of the field of view, with tip and side-port not seen.   CARDIOMEDIASTINAL SILHOUETTE: Cardiomediastinal silhouette is normal in size and configuration.   LUNGS: Lungs appear clear. No sizable pleural effusion. No pneumothorax.   ABDOMEN: No remarkable upper abdominal findings.   BONES: No acute osseous changes.       1.  No definite evidence of acute cardiopulmonary process. 2. Relation of the endotracheal tube to the patrice is difficult to assess but endotracheal tube appears likely low lying, suspected to reside roughly 1.8 cm above the patrice and retraction by roughly 2 cm suggested for optimal placement. Enteric tube approximates the esophagus and extends inferior to the diaphragm and extends beyond the inferior border of  the field of view, with tip and side-port not seen.       MACRO: None   Signed by: Mundo Arroyo 6/10/2024 10:27 PM Dictation workstation:   [id number]      Results for orders placed or performed during the hospital encounter of 06/10/24 (from the past 24 hour(s))   CBC and Auto Differential   Result Value Ref Range    WBC 6.2 4.4 - 11.3 x10*3/uL    nRBC 0.0 0.0 - 0.0 /100 WBCs    RBC 4.11 4.00 - 5.20 x10*6/uL    Hemoglobin 13.3 12.0 - 16.0 g/dL    Hematocrit 41.2 36.0 - 46.0 %     80 - 100 fL    MCH 32.4 26.0 - 34.0 pg    MCHC 32.3 32.0 - 36.0 g/dL    RDW 13.7 11.5 - 14.5 %    Platelets 296 150 - 450 x10*3/uL    Neutrophils % 29.1 40.0 - 80.0 %    Immature Granulocytes %, Automated 0.3 0.0 - 0.9 %    Lymphocytes % 59.3 13.0 - 44.0 %    Monocytes % 10.5 2.0 - 10.0 %    Eosinophils % 0.5 0.0 - 6.0 %    Basophils % 0.3 0.0 - 2.0 %    Neutrophils Absolute 1.79 1.20 - 7.70 x10*3/uL    Immature Granulocytes Absolute, Automated 0.02 0.00 - 0.70 x10*3/uL    Lymphocytes Absolute 3.66 1.20 - 4.80 x10*3/uL    Monocytes Absolute 0.65 0.10 - 1.00 x10*3/uL    Eosinophils Absolute 0.03 0.00 - 0.70 x10*3/uL    Basophils Absolute 0.02 0.00 - 0.10 x10*3/uL   Comprehensive Metabolic Panel   Result Value Ref Range    Glucose 124 (H) 74 - 99 mg/dL    Sodium 143 136 - 145 mmol/L    Potassium 2.5 (LL) 3.5 - 5.3 mmol/L    Chloride 116 (H) 98 - 107 mmol/L    Bicarbonate 18 (L) 21 - 32 mmol/L    Anion Gap 12 10 - 20 mmol/L    Urea Nitrogen 16 6 - 23 mg/dL    Creatinine 0.72 0.50 - 1.05 mg/dL    eGFR >90 >60 mL/min/1.73m*2    Calcium 6.7 (L) 8.6 - 10.3 mg/dL    Albumin 3.1 (L) 3.4 - 5.0 g/dL    Alkaline Phosphatase 41 33 - 136 U/L    Total Protein 5.5 (L) 6.4 - 8.2 g/dL    AST 35 9 - 39 U/L    Bilirubin, Total 0.4 0.0 - 1.2 mg/dL    ALT 36 7 - 45 U/L   Magnesium   Result Value Ref Range    Magnesium 1.69 1.60 - 2.40 mg/dL   Lactate   Result Value Ref Range    Lactate 4.2 (HH) 0.4 - 2.0 mmol/L   Protime-INR   Result Value Ref Range     Protime 11.7 9.8 - 12.8 seconds    INR 1.0 0.9 - 1.1   B-Type Natriuretic Peptide   Result Value Ref Range     (H) 0 - 99 pg/mL   Acute Toxicology Panel, Blood   Result Value Ref Range    Acetaminophen <10.0 10.0 - 30.0 ug/mL    Salicylate  <3 4 - 20 mg/dL    Alcohol 167 (H) <=10 mg/dL   Troponin I, High Sensitivity, Initial   Result Value Ref Range    Troponin I, High Sensitivity 8 0 - 13 ng/L   Drug Screen, Urine With Reflex to Confirmation   Result Value Ref Range    Amphetamine Screen, Urine Presumptive Negative Presumptive Negative    Barbiturate Screen, Urine Presumptive Negative Presumptive Negative    Benzodiazepines Screen, Urine Presumptive Negative Presumptive Negative    Cannabinoid Screen, Urine Presumptive Negative Presumptive Negative    Cocaine Metabolite Screen, Urine Presumptive Negative Presumptive Negative    Fentanyl Screen, Urine Presumptive Negative Presumptive Negative    Opiate Screen, Urine Presumptive Negative Presumptive Negative    Oxycodone Screen, Urine Presumptive Negative Presumptive Negative    PCP Screen, Urine Presumptive Negative Presumptive Negative    Methadone Screen, Urine Presumptive Negative Presumptive Negative   Urinalysis with Reflex Culture and Microscopic   Result Value Ref Range    Color, Urine Yellow Straw, Yellow    Appearance, Urine Hazy (N) Clear    Specific Gravity, Urine 1.016 1.005 - 1.035    pH, Urine 5.0 5.0, 5.5, 6.0, 6.5, 7.0, 7.5, 8.0    Protein, Urine 100 (2+) (N) NEGATIVE mg/dL    Glucose, Urine NEGATIVE NEGATIVE mg/dL    Blood, Urine SMALL (1+) (A) NEGATIVE    Ketones, Urine NEGATIVE NEGATIVE mg/dL    Bilirubin, Urine NEGATIVE NEGATIVE    Urobilinogen, Urine <2.0 <2.0 mg/dL    Nitrite, Urine NEGATIVE NEGATIVE    Leukocyte Esterase, Urine NEGATIVE NEGATIVE   Extra Urine Gray Tube   Result Value Ref Range    Extra Tube Hold for add-ons.    Urinalysis Microscopic   Result Value Ref Range    WBC, Urine 1-5 1-5, NONE /HPF    RBC, Urine 3-5 NONE, 1-2,  3-5 /HPF    Squamous Epithelial Cells, Urine 10-25 (FEW) Reference range not established. /HPF    Mucus, Urine 2+ Reference range not established. /LPF    Hyaline Casts, Urine 4+ (A) NONE /LPF   Sars-CoV-2 PCR   Result Value Ref Range    Coronavirus 2019, PCR Not Detected Not Detected   ECG 12 lead   Result Value Ref Range    Ventricular Rate 94 BPM    Atrial Rate 94 BPM    NJ Interval 196 ms    QRS Duration 94 ms    QT Interval 408 ms    QTC Calculation(Bazett) 510 ms    P Axis 80 degrees    R Axis 40 degrees    T Axis 79 degrees    QRS Count 16 beats    Q Onset 219 ms    P Onset 121 ms    P Offset 188 ms    T Offset 423 ms    QTC Fredericia 473 ms   Blood Gas, Arterial   Result Value Ref Range    POCT pH, Arterial 7.26 (L) 7.38 - 7.42 pH    POCT pCO2, Arterial 46 (H) 38 - 42 mm Hg    POCT pO2, Arterial 98 (H) 85 - 95 mm Hg    POCT SO2, Arterial 97 94 - 100 %    POCT Oxy Hemoglobin, Arterial 96.8 94.0 - 98.0 %    POCT Base Excess, Arterial -6.4 (L) -2.0 - 3.0 mmol/L    POCT HCO3 Calculated, Arterial 20.6 (L) 22.0 - 26.0 mmol/L    Patient Temperature      FiO2 60 %    Critical Called By RT KD     Critical Called To DR CHAHAL     Critical Call Time 2240     Critical Read Back Y    BLOOD GAS ARTERIAL FULL PANEL   Result Value Ref Range    POCT pH, Arterial 7.26 (L) 7.38 - 7.42 pH    POCT pCO2, Arterial 46 (H) 38 - 42 mm Hg    POCT pO2, Arterial 98 (H) 85 - 95 mm Hg    POCT SO2, Arterial 97 94 - 100 %    POCT Oxy Hemoglobin, Arterial 96.8 94.0 - 98.0 %    POCT Hematocrit Calculated, Arterial 37.0 36.0 - 46.0 %    POCT Sodium, Arterial 140 136 - 145 mmol/L    POCT Potassium, Arterial 2.9 (LL) 3.5 - 5.3 mmol/L    POCT Chloride, Arterial 107 98 - 107 mmol/L    POCT Ionized Calcium, Arterial 1.18 1.10 - 1.33 mmol/L    POCT Glucose, Arterial 145 (H) 74 - 99 mg/dL    POCT Lactate, Arterial 4.4 (HH) 0.4 - 2.0 mmol/L    POCT Base Excess, Arterial -6.4 (L) -2.0 - 3.0 mmol/L    POCT HCO3 Calculated, Arterial 20.6 (L) 22.0 -  26.0 mmol/L    POCT Hemoglobin, Arterial 12.3 12.0 - 16.0 g/dL    POCT Anion Gap, Arterial 15 10 - 25 mmo/L    Patient Temperature      FiO2 60 %    Critical Called By RT CATHY     Critical Called To DR CHAHAL     Critical Call Time 2240     Critical Read Back Y    Ammonia   Result Value Ref Range    Ammonia 36 16 - 53 umol/L   Troponin, High Sensitivity, 1 Hour   Result Value Ref Range    Troponin I, High Sensitivity 8 0 - 13 ng/L   Potassium   Result Value Ref Range    Potassium 3.0 (L) 3.5 - 5.3 mmol/L   Vitamin B12   Result Value Ref Range    Vitamin B12 374 211 - 911 pg/mL   Folate   Result Value Ref Range    Folate, Serum 8.0 >5.0 ng/mL   Acetaminophen   Result Value Ref Range    Acetaminophen <10.0 10.0 - 30.0 ug/mL   Salicylate   Result Value Ref Range    Salicylate  <3 4 - 20 mg/dL   ECG 12 lead   Result Value Ref Range    Ventricular Rate 68 BPM    Atrial Rate 68 BPM    VT Interval 202 ms    QRS Duration 88 ms    QT Interval 500 ms    QTC Calculation(Bazett) 531 ms    P Axis 76 degrees    R Axis 52 degrees    T Axis 72 degrees    QRS Count 11 beats    Q Onset 218 ms    P Onset 117 ms    P Offset 182 ms    T Offset 468 ms    QTC Fredericia 521 ms   POCT GLUCOSE   Result Value Ref Range    POCT Glucose 111 (H) 74 - 99 mg/dL   POCT GLUCOSE   Result Value Ref Range    POCT Glucose 80 74 - 99 mg/dL   Comprehensive Metabolic Panel   Result Value Ref Range    Glucose 102 (H) 74 - 99 mg/dL    Sodium 138 136 - 145 mmol/L    Potassium 4.6 3.5 - 5.3 mmol/L    Chloride 107 98 - 107 mmol/L    Bicarbonate 15 (L) 21 - 32 mmol/L    Anion Gap 21 (H) 10 - 20 mmol/L    Urea Nitrogen 19 6 - 23 mg/dL    Creatinine 0.95 0.50 - 1.05 mg/dL    eGFR 65 >60 mL/min/1.73m*2    Calcium 8.7 8.6 - 10.3 mg/dL    Albumin 4.0 3.4 - 5.0 g/dL    Alkaline Phosphatase 48 33 - 136 U/L    Total Protein 7.2 6.4 - 8.2 g/dL    AST 59 (H) 9 - 39 U/L    Bilirubin, Total 1.2 0.0 - 1.2 mg/dL    ALT 59 (H) 7 - 45 U/L   CBC and Auto Differential   Result  Value Ref Range    WBC 7.3 4.4 - 11.3 x10*3/uL    nRBC 0.0 0.0 - 0.0 /100 WBCs    RBC 4.17 4.00 - 5.20 x10*6/uL    Hemoglobin 13.5 12.0 - 16.0 g/dL    Hematocrit 42.8 36.0 - 46.0 %     (H) 80 - 100 fL    MCH 32.4 26.0 - 34.0 pg    MCHC 31.5 (L) 32.0 - 36.0 g/dL    RDW 13.9 11.5 - 14.5 %    Platelets 238 150 - 450 x10*3/uL    Neutrophils % 73.0 40.0 - 80.0 %    Immature Granulocytes %, Automated 0.4 0.0 - 0.9 %    Lymphocytes % 20.2 13.0 - 44.0 %    Monocytes % 6.1 2.0 - 10.0 %    Eosinophils % 0.0 0.0 - 6.0 %    Basophils % 0.3 0.0 - 2.0 %    Neutrophils Absolute 5.36 1.20 - 7.70 x10*3/uL    Immature Granulocytes Absolute, Automated 0.03 0.00 - 0.70 x10*3/uL    Lymphocytes Absolute 1.48 1.20 - 4.80 x10*3/uL    Monocytes Absolute 0.45 0.10 - 1.00 x10*3/uL    Eosinophils Absolute 0.00 0.00 - 0.70 x10*3/uL    Basophils Absolute 0.02 0.00 - 0.10 x10*3/uL   Magnesium   Result Value Ref Range    Magnesium 3.01 (H) 1.60 - 2.40 mg/dL   Phosphorus   Result Value Ref Range    Phosphorus 3.5 2.5 - 4.9 mg/dL   TSH with reflex to Free T4 if abnormal   Result Value Ref Range    Thyroid Stimulating Hormone 2.61 0.44 - 3.98 mIU/L   Lactate   Result Value Ref Range    Lactate 6.1 (HH) 0.4 - 2.0 mmol/L   Creatine Kinase   Result Value Ref Range    Creatine Kinase 351 (H) 0 - 215 U/L   Blood Gas Arterial Full Panel   Result Value Ref Range    POCT pH, Arterial 7.29 (L) 7.38 - 7.42 pH    POCT pCO2, Arterial 39 38 - 42 mm Hg    POCT pO2, Arterial 119 (H) 85 - 95 mm Hg    POCT SO2, Arterial 98 94 - 100 %    POCT Oxy Hemoglobin, Arterial 97.0 94.0 - 98.0 %    POCT Hematocrit Calculated, Arterial 38.0 36.0 - 46.0 %    POCT Sodium, Arterial 138 136 - 145 mmol/L    POCT Potassium, Arterial 4.7 3.5 - 5.3 mmol/L    POCT Chloride, Arterial 106 98 - 107 mmol/L    POCT Ionized Calcium, Arterial 1.15 1.10 - 1.33 mmol/L    POCT Glucose, Arterial 134 (H) 74 - 99 mg/dL    POCT Lactate, Arterial 4.4 (HH) 0.4 - 2.0 mmol/L    POCT Base Excess,  Arterial -7.3 (L) -2.0 - 3.0 mmol/L    POCT HCO3 Calculated, Arterial 18.8 (L) 22.0 - 26.0 mmol/L    POCT Hemoglobin, Arterial 12.7 12.0 - 16.0 g/dL    POCT Anion Gap, Arterial 18 10 - 25 mmo/L    Patient Temperature      FiO2 50 %    Ventilator Rate 18 bpm    Tidal Volume 400 mL    Peep CHM2O 5.0 cm H2O    Critical Called By DFOX RRT     Critical Called To WILBER MASON     Critical Call Time 412     Critical Read Back Y    POCT GLUCOSE   Result Value Ref Range    POCT Glucose 100 (H) 74 - 99 mg/dL   Transthoracic Echo (TTE) Complete   Result Value Ref Range    AV mn grad 6.0 mmHg    AV pk edith 1.61 m/s    LV Biplane EF 45 %    LVOT diam 2.00 cm    MV E/A ratio 0.97     Tricuspid annular plane systolic excursion 2.2 cm    MV avg E/e' ratio 7.88     LA vol index A/L 26.6 ml/m2    RV free wall pk S' 12.70 cm/s    RVSP 15.3 mmHg    LVIDd 5.20 cm    Aortic Valve Area by Continuity of Peak Velocity 2.48 cm2    AV pk grad 10.4 mmHg    Aortic Valve Area by Continuity of VTI 2.55 cm2    LV A4C EF 43.1    SST TOP   Result Value Ref Range    Extra Tube Hold for add-ons.    Lactate   Result Value Ref Range    Lactate 5.4 (HH) 0.4 - 2.0 mmol/L   Blood Gas Venous Full Panel   Result Value Ref Range    POCT pH, Venous 7.32 (L) 7.33 - 7.43 pH    POCT pCO2, Venous 40 (L) 41 - 51 mm Hg    POCT pO2, Venous 38 35 - 45 mm Hg    POCT SO2, Venous 63 45 - 75 %    POCT Oxy Hemoglobin, Venous 61.9 45.0 - 75.0 %    POCT Hematocrit Calculated, Venous 40.0 36.0 - 46.0 %    POCT Sodium, Venous 137 136 - 145 mmol/L    POCT Potassium, Venous 4.7 3.5 - 5.3 mmol/L    POCT Chloride, Venous 103 98 - 107 mmol/L    POCT Ionized Calicum, Venous 1.02 (L) 1.10 - 1.33 mmol/L    POCT Glucose, Venous 121 (H) 74 - 99 mg/dL    POCT Lactate, Venous 6.2 (HH) 0.4 - 2.0 mmol/L    POCT Base Excess, Venous -5.2 (L) -2.0 - 3.0 mmol/L    POCT HCO3 Calculated, Venous 20.6 (L) 22.0 - 26.0 mmol/L    POCT Hemoglobin, Venous 13.4 12.0 - 16.0 g/dL    POCT Anion Gap, Venous 18.0  10.0 - 25.0 mmol/L    Patient Temperature      FiO2 40 %    Ventilator Mode A/C     Ventilator Rate 18 bpm    Tidal Volume 400 mL    Peep CHM2O 5.0 cm H2O    Critical Called By FEDERICA BAPTISTE     Critical Called To MICHELLE MASON     Critical Call Time 939     Critical Read Back Y    POCT GLUCOSE   Result Value Ref Range    POCT Glucose 121 (H) 74 - 99 mg/dL        Assessment/Plan:     I am currently managing this critically ill patient for the following problems:     Patient presented with what seems like syncope and collapse presented with unresponsiveness GCS 3 required intubation and placed on mechanical ventilation.  So far workup just reveals hypokalemia and some acidosis.  Patient has limited history.  Right now patient requires further workup to investigate cause     Unresponsiveness requiring mechanical ventilation  Syncope & Collapse  Acute respiratory failure with hypoxia   Acute etoh intoxication  Non AG metabolic acidosis  Hypokalemia  Hyperglycemia in type II DM  HTN  HLD  Hx IDC s/p bilateral lumpectomy and XRT     Neuro/Psych/Pain Ctrl/Sedation:  Unresponsiveness requiring mechanical ventilation  Syncope & Collapse  Acute etoh intoxication  -MRI brain  -Carotid ultrasound  -Sedation vacation to assess neuro response, extubate when appropriate  -On minimal sedation with propofol, okay to switch to Precedex or fentanyl if needed to extubate  -Per family not an alcoholic, rarely drinks, EtOH level 167 upon arrival  -No need to start withdrawal protocol     Respiratory/ENT:  Acute respiratory failure with hypoxia  -Initially requiring about 50% FiO2, intubated due to GCS of 3, chest x-ray negative  -VAP protocol  -Wean from vent when able, likely within the next 24 to 48 hours     Cardiovascular:  HTN  HLD  -Continue statin and antihypertensives as long as BP allows  -Continuous cardiac monitoring in ICU  -Echocardiogram     GI:  -N.p.o.  -OG to LIWS     Renal/Volume Status (Intra & Extravascular)/Acid-Base:  Non AG  metabolic acidosis  Hypokalemia- resolved   -Monitor replace electrolytes, keep potassium greater than 4 magnesium greater than 2  -IV fluids with LR at rate of 125, no known history of heart failure, additional bolus of 1 L LR.   -Trend lactate until decreasing q 12   -Monitor pH on ABG/VBG  -No need for sodium bicarb    Endocrine  Hyperglycemia in type II DM  -TSH WNL   -Hold metformin  -Accu-Cheks every 4 hours while n.p.o. with SSI, hypoglycemia protocol  -Check TSH with reflex     Infectious Disease:  -No indication of active infection     Heme/Onc:  Hx IDC   -s/p bilateral lumpectomy and XRT in 2016, on Anastrozole  -Continue anastrozole        OBGYN/MSK:  -Will likely need PT OT     Ethics/Code Status:  Full code     :  DVT Prophylaxis: Lovenox  GI Prophylaxis: Protonix  Bowel Regimen: MiraLAX as needed  Diet: N.p.o.  CVC: N/A  Mya: N/A  [place]: Inserted in ED 6/10  Restraints: Soft wrist bilateral  Dispo: ICU     Critical Care Time:  35 minutes spent in preparing to see patient (I.e.labs,imaging, etc.), documentation, discussion plan of care with patient/family/caregiver, and/ or coordination of care with multidisciplinary team including the attending. Time does not include completion of procedure time.                    BRE Gong-CNP

## 2024-06-11 NOTE — H&P
[facility] Critical Care Medicine       Date:  6/11/2024  Patient:  Myrna Calhoun  YOB: 1955  MRN:  [id number]   Admit Date:  6/10/2024  ========================================================================================================    Chief Complaint   Patient presents with    Altered Mental Status     Unresponsive +ETOH. Unknown drug use. Bagged by EMS en route and upon arrival to ER pt still unresponsive. 2mg narcan IN given by EMS. 4mg narcan IM given by EMS.          History of Present Illness:  Myrna Calhoun is a 69 y.o. 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  Currently EMS report is not available.  Information in this H&P obtained from ED provider and family as patient is currently intubated and sedated.  According to patient's sister and niece, they were just in [place] with her 1 day PTA in [place] and patient seemed in her normal state of health.  They said patient never complained of any ailments or recent acute illness.  The story that transpired prior to patient's incident is not entirely clear but it looks like patient may have been out with some friends and she drove home and when she got out of her car and was walking in her grass she collapsed.  It is unclear if patient had a syncopal episode and then collapsed.  Patient was being bagged by EMS on route and on arrival, also given total 6mg Narcan with no response.  ED provider said on arrival GCS was 3 so patient was intubated.  She did have some nonpurposeful movements so she was placed on propofol.  Patient does have 2 sons, no other children, she is not .  As far as we are aware there is no advanced directives or healthcare power of .    Workup is essentially unremarkable aside from hypokalemia and none AG metabolic acidosis.   EtOH level +167, urine drug screen negative, initial head CT and cervical spine CT shows nothing acute, initial chest x-ray shows no acute cardiopulmonary process, no evidence of infection and UA.      Interval ICU Events:  Admitted to ICU on mechanical ventilator, patient is hemodynamically stable not requiring any vasopressor support.  Patient is throwing a lot of PVCs including bigeminy and trigeminy, currently potassium and magnesium is infusing.  Patient is only on 25 mics of propofol.  Workup ongoing to figure out why this patient went from being in his normal state of health to unresponsive.    Medical History:  Past Medical History:   Diagnosis Date    Adjustment disorder with depressed mood 10/23/2023    Diabetes (Multi)     Hyperlipidemia     Hypertension     Invasive ductal carcinoma of breast, female (Multi) 2016    Bilateral     Past Surgical History:   Procedure Laterality Date    BREAST LUMPECTOMY Bilateral 2016    COLONOSCOPY  10/2023    SENTINEL LYMPH NODE BIOPSY Bilateral 03/2016    TOTAL KNEE ARTHROPLASTY Bilateral     TUBAL LIGATION       Medications Prior to Admission   Medication Sig Dispense Refill Last Dose    DULoxetine (Cymbalta) 60 mg DR capsule Take 1 capsule (60 mg) by mouth once daily.       ezetimibe (Zetia) 10 mg tablet Take 1 tablet (10 mg) by mouth once daily.       fluticasone (Flonase) 50 mcg/actuation nasal spray SPRAY 1 - 2 SPRAY BY INTRANASAL ROUTE EVERY DAY IN EACH NOSTRIL AS NEEDED       metFORMIN (Glucophage) 500 mg tablet Take 1 tablet (500 mg) by mouth 2 times a day with meals.       metoprolol tartrate (Lopressor) 50 mg tablet Take 1 tablet by mouth 2 times a day with meals.       traZODone (Desyrel) 50 mg tablet TAKE 1-2 TABLETS BY ORAL ROUTE EVERY EVENING AS NEEDED       Vitamin D3 50 mcg (2,000 unit) capsule TAKE 1 CAPSULE BY ORAL ROUTE EVERY DAY WITH A MEAL (ABSORBED WITH FAT)        Simvastatin  Social History     Tobacco Use    Smoking status: Never    Smokeless  tobacco: Never   Vaping Use    Vaping status: Never Used   Substance Use Topics    Alcohol use: Yes     Comment: rare, social    Drug use: Never     Family History   Problem Relation Name Age of Onset    No Known Problems Mother      Hypertension Sister         Hospital Medications:    propofol, 5-50 mcg/kg/min, Last Rate: 20 mcg/kg/min (06/10/24 1833)          Current Facility-Administered Medications:     dextrose 50 % injection 12.5 g, 12.5 g, intravenous, q15 min PRN, Anali E Pentito, APRN-CNP    dextrose 50 % injection 25 g, 25 g, intravenous, q15 min PRN, Anali E Pentito, APRN-CNP    enoxaparin (Lovenox) syringe 40 mg, 40 mg, subcutaneous, q24h, Anali E Pentito, APRN-CNP    glucagon (Glucagen) injection 1 mg, 1 mg, intramuscular, q15 min PRN, Anali E Pentito, APRN-CNP    glucagon (Glucagen) injection 1 mg, 1 mg, intramuscular, q15 min PRN, Anali E Pentito, APRN-CNP    insulin lispro (HumaLOG) injection 0-5 Units, 0-5 Units, subcutaneous, q4h, Anali E Pentito, APRN-CNP    magnesium sulfate IV 2 g, 2 g, intravenous, Once, Teofilo Gomez DO, Stopped at 06/11/24 0203    magnesium sulfate IV 2 g, 2 g, intravenous, Once, Anali E Pentito, APRN-CNP, Last Rate: 25 mL/hr at 06/11/24 0122, 2 g at 06/11/24 0122    oxygen (O2) therapy, , inhalation, Continuous - Inhalation, Teofilo Gomez DO    pantoprazole (ProtoNix) injection 40 mg, 40 mg, intravenous, Daily, Anali E Pentito, APRN-CNP    potassium chloride 20 mEq in 100 mL IV premix, 20 mEq, intravenous, Once, Anali E Pentito, APRN-CNP, Last Rate: 50 mL/hr at 06/11/24 0110, 20 mEq at 06/11/24 0110    potassium chloride 20 mEq in 100 mL IV premix, 20 mEq, intravenous, Once, Anali E Pentito, APRN-CNP    propofol (Diprivan) infusion, 5-50 mcg/kg/min, intravenous, Continuous, Teofilo N Jason, DO, Last Rate: 12.84 mL/hr at 06/10/24 2329, 20 mcg/kg/min at 06/10/24 2329    Review of Systems:  14 point review of systems was completed and negative except for  "those specially mention in my HPI    Physical Exam:    Heart Rate:  []   Temp:  [35.3 °C (95.5 °F)-36.5 °C (97.7 °F)]   Resp:  [4-18]   BP: (101-162)/()   Height:  [172.7 cm (5' 8\")]   Weight:  [107 kg (235 lb)-109 kg (239 lb 13.8 oz)]   SpO2:  [93 %-98 %]     Physical Exam  Constitutional:       Appearance: She is obese.      Comments: Intubated and sedated   HENT:      Head: Normocephalic.      Right Ear: External ear normal.      Left Ear: External ear normal.      Mouth/Throat:      Mouth: Mucous membranes are moist.      Pharynx: Oropharynx is clear.   Eyes:      Conjunctiva/sclera: Conjunctivae normal.      Pupils: Pupils are equal, round, and reactive to light.   Cardiovascular:      Rate and Rhythm: Normal rate and regular rhythm.      Pulses: Normal pulses.      Heart sounds: No murmur heard.  Pulmonary:      Breath sounds: Normal breath sounds.      Comments: Intubated on mechanical vent  Abdominal:      General: Bowel sounds are normal.      Palpations: Abdomen is soft.   Musculoskeletal:         General: No swelling or signs of injury.   Skin:     General: Skin is warm and dry.      Capillary Refill: Capillary refill takes less than 2 seconds.   Neurological:      Comments: Intubated and sedated, no myoclonus noted   Psychiatric:      Comments: Intubated and sedated         Objective:    I have reviewed all medications, laboratory results, and imaging pertinent for today's encounter.    Vent Mode: Volume control/assist control  FiO2 (%):  [50 %-60 %] 50 %  S RR:  [16-18] 18  S VT:  [400 mL] 400 mL  PEEP/CPAP (cm H2O):  [8 cm H20] 8 cm H20  MAP (cm H2O):  [9-12] 12      Intake/Output Summary (Last 24 hours) at 6/11/2024 0129  Last data filed at 6/11/2024 0100  Gross per 24 hour   Intake --   Output 775 ml   Net -775 ml     Results for orders placed or performed during the hospital encounter of 06/10/24 (from the past 24 hour(s))   CBC and Auto Differential   Result Value Ref Range    WBC 6.2 " 4.4 - 11.3 x10*3/uL    nRBC 0.0 0.0 - 0.0 /100 WBCs    RBC 4.11 4.00 - 5.20 x10*6/uL    Hemoglobin 13.3 12.0 - 16.0 g/dL    Hematocrit 41.2 36.0 - 46.0 %     80 - 100 fL    MCH 32.4 26.0 - 34.0 pg    MCHC 32.3 32.0 - 36.0 g/dL    RDW 13.7 11.5 - 14.5 %    Platelets 296 150 - 450 x10*3/uL    Neutrophils % 29.1 40.0 - 80.0 %    Immature Granulocytes %, Automated 0.3 0.0 - 0.9 %    Lymphocytes % 59.3 13.0 - 44.0 %    Monocytes % 10.5 2.0 - 10.0 %    Eosinophils % 0.5 0.0 - 6.0 %    Basophils % 0.3 0.0 - 2.0 %    Neutrophils Absolute 1.79 1.20 - 7.70 x10*3/uL    Immature Granulocytes Absolute, Automated 0.02 0.00 - 0.70 x10*3/uL    Lymphocytes Absolute 3.66 1.20 - 4.80 x10*3/uL    Monocytes Absolute 0.65 0.10 - 1.00 x10*3/uL    Eosinophils Absolute 0.03 0.00 - 0.70 x10*3/uL    Basophils Absolute 0.02 0.00 - 0.10 x10*3/uL   Comprehensive Metabolic Panel   Result Value Ref Range    Glucose 124 (H) 74 - 99 mg/dL    Sodium 143 136 - 145 mmol/L    Potassium 2.5 (LL) 3.5 - 5.3 mmol/L    Chloride 116 (H) 98 - 107 mmol/L    Bicarbonate 18 (L) 21 - 32 mmol/L    Anion Gap 12 10 - 20 mmol/L    Urea Nitrogen 16 6 - 23 mg/dL    Creatinine 0.72 0.50 - 1.05 mg/dL    eGFR >90 >60 mL/min/1.73m*2    Calcium 6.7 (L) 8.6 - 10.3 mg/dL    Albumin 3.1 (L) 3.4 - 5.0 g/dL    Alkaline Phosphatase 41 33 - 136 U/L    Total Protein 5.5 (L) 6.4 - 8.2 g/dL    AST 35 9 - 39 U/L    Bilirubin, Total 0.4 0.0 - 1.2 mg/dL    ALT 36 7 - 45 U/L   Magnesium   Result Value Ref Range    Magnesium 1.69 1.60 - 2.40 mg/dL   Lactate   Result Value Ref Range    Lactate 4.2 (HH) 0.4 - 2.0 mmol/L   Protime-INR   Result Value Ref Range    Protime 11.7 9.8 - 12.8 seconds    INR 1.0 0.9 - 1.1   B-Type Natriuretic Peptide   Result Value Ref Range     (H) 0 - 99 pg/mL   Acute Toxicology Panel, Blood   Result Value Ref Range    Acetaminophen <10.0 10.0 - 30.0 ug/mL    Salicylate  <3 4 - 20 mg/dL    Alcohol 167 (H) <=10 mg/dL   Troponin I, High Sensitivity,  Initial   Result Value Ref Range    Troponin I, High Sensitivity 8 0 - 13 ng/L   Drug Screen, Urine With Reflex to Confirmation   Result Value Ref Range    Amphetamine Screen, Urine Presumptive Negative Presumptive Negative    Barbiturate Screen, Urine Presumptive Negative Presumptive Negative    Benzodiazepines Screen, Urine Presumptive Negative Presumptive Negative    Cannabinoid Screen, Urine Presumptive Negative Presumptive Negative    Cocaine Metabolite Screen, Urine Presumptive Negative Presumptive Negative    Fentanyl Screen, Urine Presumptive Negative Presumptive Negative    Opiate Screen, Urine Presumptive Negative Presumptive Negative    Oxycodone Screen, Urine Presumptive Negative Presumptive Negative    PCP Screen, Urine Presumptive Negative Presumptive Negative    Methadone Screen, Urine Presumptive Negative Presumptive Negative   Urinalysis with Reflex Culture and Microscopic   Result Value Ref Range    Color, Urine Yellow Straw, Yellow    Appearance, Urine Hazy (N) Clear    Specific Gravity, Urine 1.016 1.005 - 1.035    pH, Urine 5.0 5.0, 5.5, 6.0, 6.5, 7.0, 7.5, 8.0    Protein, Urine 100 (2+) (N) NEGATIVE mg/dL    Glucose, Urine NEGATIVE NEGATIVE mg/dL    Blood, Urine SMALL (1+) (A) NEGATIVE    Ketones, Urine NEGATIVE NEGATIVE mg/dL    Bilirubin, Urine NEGATIVE NEGATIVE    Urobilinogen, Urine <2.0 <2.0 mg/dL    Nitrite, Urine NEGATIVE NEGATIVE    Leukocyte Esterase, Urine NEGATIVE NEGATIVE   Urinalysis Microscopic   Result Value Ref Range    WBC, Urine 1-5 1-5, NONE /HPF    RBC, Urine 3-5 NONE, 1-2, 3-5 /HPF    Squamous Epithelial Cells, Urine 10-25 (FEW) Reference range not established. /HPF    Mucus, Urine 2+ Reference range not established. /LPF    Hyaline Casts, Urine 4+ (A) NONE /LPF   Sars-CoV-2 PCR   Result Value Ref Range    Coronavirus 2019, PCR Not Detected Not Detected   Blood Gas, Arterial   Result Value Ref Range    POCT pH, Arterial 7.26 (L) 7.38 - 7.42 pH    POCT pCO2, Arterial 46 (H)  38 - 42 mm Hg    POCT pO2, Arterial 98 (H) 85 - 95 mm Hg    POCT SO2, Arterial 97 94 - 100 %    POCT Oxy Hemoglobin, Arterial 96.8 94.0 - 98.0 %    POCT Base Excess, Arterial -6.4 (L) -2.0 - 3.0 mmol/L    POCT HCO3 Calculated, Arterial 20.6 (L) 22.0 - 26.0 mmol/L    Patient Temperature      FiO2 60 %    Critical Called By RT CATHY     Critical Called To DR CHAHAL     Critical Call Time 2240     Critical Read Back Y    BLOOD GAS ARTERIAL FULL PANEL   Result Value Ref Range    POCT pH, Arterial 7.26 (L) 7.38 - 7.42 pH    POCT pCO2, Arterial 46 (H) 38 - 42 mm Hg    POCT pO2, Arterial 98 (H) 85 - 95 mm Hg    POCT SO2, Arterial 97 94 - 100 %    POCT Oxy Hemoglobin, Arterial 96.8 94.0 - 98.0 %    POCT Hematocrit Calculated, Arterial 37.0 36.0 - 46.0 %    POCT Sodium, Arterial 140 136 - 145 mmol/L    POCT Potassium, Arterial 2.9 (LL) 3.5 - 5.3 mmol/L    POCT Chloride, Arterial 107 98 - 107 mmol/L    POCT Ionized Calcium, Arterial 1.18 1.10 - 1.33 mmol/L    POCT Glucose, Arterial 145 (H) 74 - 99 mg/dL    POCT Lactate, Arterial 4.4 (HH) 0.4 - 2.0 mmol/L    POCT Base Excess, Arterial -6.4 (L) -2.0 - 3.0 mmol/L    POCT HCO3 Calculated, Arterial 20.6 (L) 22.0 - 26.0 mmol/L    POCT Hemoglobin, Arterial 12.3 12.0 - 16.0 g/dL    POCT Anion Gap, Arterial 15 10 - 25 mmo/L    Patient Temperature      FiO2 60 %    Critical Called By RT CATHY     Critical Called To DR CHAHAL     Critical Call Time 2240     Critical Read Back Y    Ammonia   Result Value Ref Range    Ammonia 36 16 - 53 umol/L   Troponin, High Sensitivity, 1 Hour   Result Value Ref Range    Troponin I, High Sensitivity 8 0 - 13 ng/L   Potassium   Result Value Ref Range    Potassium 3.0 (L) 3.5 - 5.3 mmol/L   POCT GLUCOSE   Result Value Ref Range    POCT Glucose 111 (H) 74 - 99 mg/dL      CT head wo IV contrast, CT cervical spine wo IV contrast  Narrative: Interpreted By:  Finkelstein, Evan,   STUDY:  CT HEAD WO IV CONTRAST; CT CERVICAL SPINE WO IV CONTRAST;   6/10/2024  10:27 pm      INDICATION:  Signs/Symptoms:ams, ?fall.      COMPARISON:  None.      ACCESSION NUMBER(S):  [id number]; [id number]      ORDERING CLINICIAN:  ALESSIA CHAHAL      TECHNIQUE:  Noncontrast CT images of the head with coronal and sagittal  reconstructions. Axial noncontrast CT images of the cervical spine  with coronal and sagittal reconstructed images.      FINDINGS:  EXTRACRANIAL SOFT TISSUES: Unremarkable.      CALVARIUM: No depressed skull fracture. No destructive osseous lesion.      PARANASAL SINUSES/MASTOIDS: Fluid levels in the maxillary sinuses  bilaterally. Mastoid air cells are aerated.      HEMORRHAGE: No acute intracranial hemorrhage.      BRAIN PARENCHYMA: Gray-white matter interfaces are preserved. No mass  effect or midline shift. There are nonspecific scattered white matter  hypodensities.      VENTRICLES and EXTRA-AXIAL SPACES: Normal size.      OTHER FINDINGS: None.      CERVICAL SPINE:      ALIGNMENT: Reversal of the normal cervical lordosis, which may be  positional or related to spasm. VERTEBRAE: No acute loss of vertebral  body height. DISC SPACE: Disc space narrowing C5/C6  SPINAL CANAL: Multilevel facet and uncovertebral arthropathy with  varying degrees of neural foraminal stenosis. Prominent posterior  disc osteophyte complex at C5/C6 and likely ossification along the  posterior longitudinal ligament contributes to severe central  narrowing. PREVERTEBRAL SOFT TISSUES: No prevertebral soft tissue  swelling. LUNG APICES: Partially visualized endotracheal and gastric  tube.      OTHER FINDINGS: None.      Impression: No acute intracranial hemorrhage, mass effect or midline shift.      Nonspecific scattered white matter hypodensities favored to represent  sequela of small vessel ischemia. Fluid levels in the maxillary  sinuses bilaterally. Correlate for symptoms of sinusitis.      Cervical spondylosis without acute loss of vertebral body height or  traumatic  malalignment. Prominent posterior disc osteophyte complex  at C5/C6 and likely ossification along the posterior longitudinal  ligament contributes to severe central narrowing at this level.          MACRO:  None.      Signed by: Evan Finkelstein 6/10/2024 11:36 PM  Dictation workstation:   [id number]  XR chest 1 view  Narrative: Interpreted By:  Mundo Arroyo,   STUDY:  XR CHEST 1 VIEW;  6/10/2024 9:51 pm      INDICATION:  Signs/Symptoms:Intubation.      COMPARISON:  11/20/2020.      ACCESSION NUMBER(S):  [id number]      ORDERING CLINICIAN:  ALESSIA CHAHAL      FINDINGS:  AP supine radiograph of the chest was provided.      Leads and pacer pad overlie the chest, partially obscuring the  field-of-view.      Relation of the endotracheal tube to the patrice is difficult to  assess but endotracheal tube appears likely low lying, suspected to  reside roughly 1.8 cm above the patrice and retraction by roughly 2 cm  suggested for optimal placement. Enteric tube approximates the  esophagus and extends inferior to the diaphragm and extends beyond  the inferior border of the field of view, with tip and side-port not  seen.      CARDIOMEDIASTINAL SILHOUETTE:  Cardiomediastinal silhouette is normal in size and configuration.      LUNGS:  Lungs appear clear. No sizable pleural effusion. No pneumothorax.      ABDOMEN:  No remarkable upper abdominal findings.      BONES:  No acute osseous changes.      Impression: 1.  No definite evidence of acute cardiopulmonary process.  2. Relation of the endotracheal tube to the patrice is difficult to  assess but endotracheal tube appears likely low lying, suspected to  reside roughly 1.8 cm above the patrice and retraction by roughly 2 cm  suggested for optimal placement. Enteric tube approximates the  esophagus and extends inferior to the diaphragm and extends beyond  the inferior border of the field of view, with tip and side-port not  seen.              MACRO:  None      Signed by:  Mundo Arroyo 6/10/2024 10:27 PM  Dictation workstation:   [id number]       Assessment/Plan:    I am currently managing this critically ill patient for the following problems:    Patient presented with what seems like syncope and collapse presented with unresponsiveness GCS 3 required intubation and placed on mechanical ventilation.  So far workup just reveals hypokalemia and some acidosis.  Patient has limited history.  Right now patient requires further workup to investigate cause    Unresponsiveness requiring mechanical ventilation  Syncope & Collapse  Acute respiratory failure with hypoxia   Acute etoh intoxication  Non AG metabolic acidosis  Hypokalemia  Hyperglycemia in type II DM  HTN  HLD  Hx IDC s/p bilateral lumpectomy and XRT    Neuro/Psych/Pain Ctrl/Sedation:  Unresponsiveness requiring mechanical ventilation  Syncope & Collapse  Acute etoh intoxication  -MRI brain  -Carotid ultrasound  -Sedation vacation to assess neuro response, extubate when appropriate  -On minimal sedation with propofol, okay to switch to Precedex or fentanyl if needed to extubate  -Per family not an alcoholic, rarely drinks, EtOH level 167 upon arrival  -No need to start withdrawal protocol    Respiratory/ENT:  Acute respiratory failure with hypoxia  -Initially requiring about 50% FiO2, intubated due to GCS of 3, chest x-ray negative  -VAP protocol  -Wean from vent when able, likely within the next 24 to 48 hours    Cardiovascular:  HTN  HLD  -Continue statin and antihypertensives as long as BP allows  -Continuous cardiac monitoring in ICU  -Echocardiogram    GI:  -N.p.o.  -OG to LIWS    Renal/Volume Status (Intra & Extravascular)/Acid-Base:  Non AG metabolic acidosis  Hypokalemia  -Monitor replace electrolytes, keep potassium greater than 4 magnesium greater than 2  -IV fluids with LR at rate of 125, no known history of heart failure  -Trend lactate until decreasing  -Monitor pH on ABG/VBG  -No need for sodium  bicarb    Endocrine  Hyperglycemia in type II DM  -Hold metformin  -Accu-Cheks every 4 hours while n.p.o. with SSI, hypoglycemia protocol  -Check TSH with reflex    Infectious Disease:  -No indication of active infection    Heme/Onc:  Hx IDC   -s/p bilateral lumpectomy and XRT in 2016, on Anastrozole  -Continue anastrozole      OBGYN/MSK:  -Will likely need PT OT    Ethics/Code Status:  Full code    :  DVT Prophylaxis: Lovenox  GI Prophylaxis: Protonix  Bowel Regimen: MiraLAX as needed  Diet: N.p.o.  CVC: N/A  [place]: N/A  [place]: Inserted in ED 6/10  Restraints: Soft wrist bilateral  Dispo: ICU    Critical Care Time:  35 minutes spent in preparing to see patient (I.e.labs,imaging, etc.), documentation, discussion plan of care with patient/family/caregiver, and/ or coordination of care with multidisciplinary team including the attending. Time does not include completion of procedure time.     Anali Golden, APRN-CNP

## 2024-06-11 NOTE — H&P (VIEW-ONLY)
Inpatient consult to Cardiology  Consult performed by: ANDREW Rogers  Consult ordered by: BRE Gong-CNP  Reason for consult: chf                            Date:   6/11/2024  Patient name:  Myrna Calhoun  Date of admission:  6/10/2024  9:40 PM  MRN:   [id number]  YOB: 1955  Time of Consult:  12:18 PM    Consulting Cardiologist: BRE Simms, CNP  Primary Cardiologist:  None  Referring Provider: Dr. Oseguera      Admission Diagnosis:     Unresponsive      History of Present Illness:      69-year-old female with past medical history of bilateral IDC status post bilateral lumpectomy with SLNB and XRT treated with anastrozole, obesity, hypertension, hyperlipidemia, diabetes mellitus type 2 and osteoarthritis who presented to [facility] emergency department early this morning unresponsive.  There is next history and what really happened but seems as though the patient was out with some friends and then drove home and when she got out of the car was walking in the grass and she collapsed.  Unclear whether or not she got dizzy or tripped.  EMS was called and they initially started bag mask ventilations and she was given a total of 6 mg of Narcan with no response.  In the emergency department on arrival her GCS was 3 she was intubated.  She did have nonpurposeful movements in the emergency department.  Initial lab work showed a glucose of 124, potassium with just 2.5 but hemolyzed and repeat came back at 3.0, chloride 116, bicarb 18, albumin 3.1, lactate 4.2, mag 1.69, troponin 8, glucose 124.  Urinalysis negative.  Toxicology screen negative.  She remains intubated and sedated currently.  Carotid ultrasound negative.  CT chest abdomen pelvis showed bilateral airspace opacities dependent portion of the lower chest compatible with pneumonia potentially aspiration and potential gallbladder sludge.  CT head and C-spine negative  for any acute intracranial process.  Echocardiogram was performed which showed mildly decreased LV function with a 45% estimated ejection fraction, trace tricuspid regurgitation.  No wall motion abnormalities were identified.  Bubble study is negative.  The patient was admitted to the ICU intubated and sedated and general cardiology was consulted for decreased EF.  Today the patient is alert and following commands with plans for extubation.    Allergies:     Allergies   Allergen Reactions    Simvastatin Cough         Past Medical History:     Past Medical History:   Diagnosis Date    Adjustment disorder with depressed mood 10/23/2023    Diabetes (Multi)     Hyperlipidemia     Hypertension     Invasive ductal carcinoma of breast, female (Multi) 2016    Bilateral       Past Surgical History:     Past Surgical History:   Procedure Laterality Date    BREAST LUMPECTOMY Bilateral 2016    COLONOSCOPY  10/2023    SENTINEL LYMPH NODE BIOPSY Bilateral 03/2016    TOTAL KNEE ARTHROPLASTY Bilateral     TUBAL LIGATION         Family History:     Family History   Problem Relation Name Age of Onset    No Known Problems Mother      Hypertension Sister         Social History:     Social History     Tobacco Use    Smoking status: Never    Smokeless tobacco: Never   Vaping Use    Vaping status: Never Used   Substance Use Topics    Alcohol use: Yes     Comment: rare, social    Drug use: Never       CURRENT INPATIENT MEDICATIONS    enoxaparin, 40 mg, subcutaneous, q24h  insulin lispro, 0-5 Units, subcutaneous, q4h  lactated Ringer's, 1,000 mL, intravenous, Once  oxygen, , inhalation, Continuous - Inhalation  pantoprazole, 40 mg, intravenous, Daily      dexmedeTOMIDine, 0.1-1.5 mcg/kg/hr, Last Rate: 0.31 mcg/kg/hr (06/11/24 1200)  lactated Ringer's, 125 mL/hr, Last Rate: Stopped (06/11/24 0710)  propofol, 5-50 mcg/kg/min, Last Rate: 15 mcg/kg/min (06/11/24 1130)      Current Outpatient Medications   Medication Instructions    DULoxetine  (CYMBALTA) 60 mg, oral, Daily    ezetimibe (ZETIA) 10 mg, oral, Daily    fluticasone (Flonase) 50 mcg/actuation nasal spray Administer 1 spray into each nostril once daily as needed for allergies.    metFORMIN (GLUCOPHAGE) 500 mg, oral, 2 times daily, With morning and evening meals    metoprolol tartrate (LOPRESSOR) 50 mg, oral, 2 times daily, With meals    traZODone (Desyrel) 50 mg tablet Take 1 tablet (50 mg) by mouth as needed at bedtime.    Vitamin D3 50 mcg (2,000 unit) capsule Take 1 capsule (50 mcg) by mouth early in the morning.. With a meal        Review of Systems:      12 point review of systems was obtained in detail and is negative other than that detailed above.    Vital Signs:     Vitals:    06/11/24 1051 06/11/24 1059 06/11/24 1100 06/11/24 1137   BP: 100/54 99/52  106/61   BP Location:       Patient Position:       Pulse: 84 82 84 86   Resp: 12 (!) 33 26 23   Temp:   36.4 °C (97.5 °F)    TempSrc:   Temporal    SpO2: 99% 98% 98% 98%   Weight:       Height:           Intake/Output Summary (Last 24 hours) at 6/11/2024 1218  Last data filed at 6/11/2024 1100  Gross per 24 hour   Intake 806.54 ml   Output 1415 ml   Net -608.46 ml       Wt Readings from Last 4 Encounters:   06/11/24 109 kg (239 lb 13.8 oz)   02/05/24 107 kg (235 lb)   10/30/23 110 kg (242 lb)   10/24/23 110 kg (242 lb 12.8 oz)       Physical Examination:     Physical Exam  Vitals and nursing note reviewed.   Constitutional:       Appearance: Normal appearance.      Interventions: She is intubated.   HENT:      Head: Normocephalic.      Mouth/Throat:      Mouth: Mucous membranes are moist.      Comments: ETT  Eyes:      Pupils: Pupils are equal, round, and reactive to light.   Cardiovascular:      Rate and Rhythm: Normal rate and regular rhythm.      Heart sounds: Normal heart sounds, S1 normal and S2 normal.      Comments: Occasional PVCs  Pulmonary:      Effort: Pulmonary effort is normal. She is intubated.      Breath sounds: Decreased  air movement present.   Abdominal:      Palpations: Abdomen is soft.   Skin:     General: Skin is warm and dry.      Capillary Refill: Capillary refill takes less than 2 seconds.   Neurological:      Mental Status: She is alert.      Comments: Follows commands   Psychiatric:         Mood and Affect: Mood normal.         Behavior: Behavior is cooperative.           Lab:     CBC:   Results from last 7 days   Lab Units 06/11/24  0354 06/10/24  2147   WBC AUTO x10*3/uL 7.3 6.2   RBC AUTO x10*6/uL 4.17 4.11   HEMOGLOBIN g/dL 13.5 13.3   HEMATOCRIT % 42.8 41.2   MCV fL 103* 100   MCH pg 32.4 32.4   MCHC g/dL 31.5* 32.3   RDW % 13.9 13.7   PLATELETS AUTO x10*3/uL 238 296     CMP:    Results from last 7 days   Lab Units 06/11/24  0354 06/10/24  2238 06/10/24  2147   SODIUM mmol/L 138  --  143   POTASSIUM mmol/L 4.6 3.0* 2.5*   CHLORIDE mmol/L 107  --  116*   CO2 mmol/L 15*  --  18*   BUN mg/dL 19  --  16   CREATININE mg/dL 0.95  --  0.72   GLUCOSE mg/dL 102*  --  124*   PROTEIN TOTAL g/dL 7.2  --  5.5*   CALCIUM mg/dL 8.7  --  6.7*   BILIRUBIN TOTAL mg/dL 1.2  --  0.4   ALK PHOS U/L 48  --  41   AST U/L 59*  --  35   ALT U/L 59*  --  36     BMP:    Results from last 7 days   Lab Units 06/11/24  0354 06/10/24  2238 06/10/24  2147   SODIUM mmol/L 138  --  143   POTASSIUM mmol/L 4.6 3.0* 2.5*   CHLORIDE mmol/L 107  --  116*   CO2 mmol/L 15*  --  18*   BUN mg/dL 19  --  16   CREATININE mg/dL 0.95  --  0.72   CALCIUM mg/dL 8.7  --  6.7*   GLUCOSE mg/dL 102*  --  124*     Magnesium:  Results from last 7 days   Lab Units 06/11/24  0354 06/10/24  2147   MAGNESIUM mg/dL 3.01* 1.69     Troponin:    Results from last 7 days   Lab Units 06/10/24  2238 06/10/24  2147   TROPHS ng/L 8 8     BNP:   Results from last 7 days   Lab Units 06/10/24  2147   BNP pg/mL 144*     Lipid Panel:         Diagnostic Studies:   [place] [id number]   Tel [phone] Fax [phone]     TRANSTHORACIC ECHOCARDIOGRAM  REPORT        Patient Name:      GLADYS PATTERSON      [place] Physician:    [id number] Jeremías Bustos DO  Study Date:        6/11/2024             Ordering Provider:    [id number] NATHANAEL FAN  MRN/PID:           [id number]              Fellow:  Accession#:        [id number]          Nurse:  Date of Birth/Age: 1955 / 69 years   Sonographer:          Tram MASSEY  Gender:            F                     Additional Staff:  Height:            172.72 cm             Admit Date:           6/10/2024  Weight:            108.41 kg             Admission Status:     Inpatient -                                                                 Routine  BSA / BMI:         2.20 m2 / 36.34 kg/m2 Department Location:  [facility]  Blood Pressure: 109 /63 mmHg     Study Type:    TRANSTHORACIC ECHO (TTE) COMPLETE  Diagnosis/ICD: Syncope and collapse-R55  Indication:    UNRESPONSIVE, SYNCOPE AND COLLAPSE  CPT Codes:     Echo Complete w Full [id number]     Patient History:  Diabetes:          Yes  Pertinent History: HTN and Hyperlipidemia.     Study Detail: The following Echo studies were performed: 2D, M-Mode, Doppler and                color flow. Technically challenging study due to poor acoustic                windows, prominent lung artifact, patient lying in supine position                and body habitus. The patient is intubated. Definity used as a                contrast agent for endocardial border definition and agitated                saline used as a contrast agent for intraseptal flow evaluation.                Total contrast used for this procedure was 3 mL via IV push.                Unable to obtain suprasternal notch view. The patient was sedated.        PHYSICIAN INTERPRETATION:  Left Ventricle: Left ventricular  systolic function is mildly decreased, with an estimated ejection fraction of 45%. There are no regional wall motion abnormalities. The left ventricular cavity size is normal. There is mild to moderate concentric left ventricular hypertrophy. Spectral Doppler shows an impaired relaxation pattern of left ventricular diastolic filling.  Left Atrium: The left atrium is normal in size. A bubble study using agitated saline was performed. Bubble study is negative.  Right Ventricle: The right ventricle is normal in size. There is normal right ventricular global systolic function.  Right Atrium: The right atrium is normal in size.  Aortic Valve: The aortic valve appears structurally normal. The aortic valve appears tricuspid. There is no evidence of aortic valve stenosis.  There is no evidence of aortic valve regurgitation. The peak instantaneous gradient of the aortic valve is 10.4 mmHg. The mean gradient of the aortic valve is 6.0 mmHg.  Mitral Valve: The mitral valve is normal in structure. There is no evidence of mitral valve stenosis. There is normal mitral valve leaflet mobility. There is no evidence of mitral valve regurgitation.  Tricuspid Valve: The tricuspid valve is structurally normal. There is normal tricuspid valve leaflet mobility. There is trace tricuspid regurgitation.  Pulmonic Valve: The pulmonic valve is structurally normal. There is no indication of pulmonic valve regurgitation.  Pericardium: There is no pericardial effusion noted.  Aorta: The aortic root is normal.  Pulmonary Artery: The pulmonary artery is not well visualized. The tricuspid regurgitant velocity is 1.75 m/s, and with an estimated right atrial pressure of 3 mmHg, the estimated pulmonary artery pressure is normal with the RVSP at 15.2 mmHg.  Systemic Veins: The inferior vena cava appears to be of normal size.  In comparison to the previous echocardiogram(s): There are no prior studies on this patient for comparison purposes.         CONCLUSIONS:   1. Left ventricular systolic function is mildly decreased with a 45% estimated ejection fraction.   2. Spectral Doppler shows an impaired relaxation pattern of left ventricular diastolic filling.   3. There is no evidence of mitral valve stenosis.   4. No evidence of mitral valve regurgitation.   5. Trace tricuspid regurgitation is visualized.   6. Aortic valve stenosis is not present.   7. The pulmonary artery is not well visualized.    Carotid duplex bilateral    Result Date: 6/11/2024  Interpreted By:  Ru Jackson, STUDY: [facility] [place] CAROTID ARTERY DUPLEX BILATERAL;  6/11/2024 8:15 am   INDICATION: Signs/Symptoms:syncope & collapse.   COMPARISON: None.   ACCESSION NUMBER(S): [id number]   ORDERING CLINICIAN: NATHANAEL FAN   TECHNIQUE: Vascular ultrasound of the extracranial carotid system was performed bilaterally.  Gray scale, color Doppler and spectral Doppler waveform analysis was performed.   FINDINGS: Mild atherosclerotic disease.   RIGHT:   RIGHT SIDE PEAK SYSTOLIC VELOCITY TABLE: CCA  108 cm/s. ICA  118 cm/s. ECA  140 cm/s.     The ratio of the peak systolic velocity of the right ICA/CCA is  1.09.   RIGHT VERTEBRAL ARTERY: The right vertebral artery demonstrates proximal anterograde flow.   LEFT:   LEFT SIDE PEAK SYSTOLIC VELOCITY TABLE: CCA 97 cm/s.  cm/s.  cm/s.     The ratio of the peak systolic velocity of the left ICA/CCA is 1.26.   LEFT VERTEBRAL ARTERY: The left vertebral artery demonstrates proximal anterograde flow.       Utilizing the peak systolic velocities, the estimated degree of stenosis within the internal carotid arteries is less than 50% bilaterally.   MACRO: None.     Signed by: Ru Jackson 6/11/2024 10:51 AM Dictation workstation:   [id number]    ECG 12 lead    Result Date: 6/11/2024  Sinus rhythm with frequent Premature ventricular complexes Possible Left atrial enlargement Nonspecific T wave abnormality Prolonged QT Abnormal ECG When compared with ECG of  10-WOLF-2024 21:55, (unconfirmed) No significant change was found See ED provider note for full interpretation and clinical correlation    ECG 12 lead    Result Date: 6/11/2024  Sinus rhythm with frequent Premature ventricular complexes Biatrial enlargement Nonspecific T wave abnormality Prolonged QT Abnormal ECG When compared with ECG of 06-FEB-2024 00:13, Nonspecific T wave abnormality, worse in Lateral leads    CT head wo IV contrast    Result Date: 6/10/2024  Interpreted By:  Finkelstein, Evan, STUDY: CT HEAD WO IV CONTRAST; CT CERVICAL SPINE WO IV CONTRAST;  6/10/2024 10:27 pm   INDICATION: Signs/Symptoms:ams, ?fall.   COMPARISON: None.   ACCESSION NUMBER(S): [id number]; [id number]   ORDERING CLINICIAN: ALESSIA CHAHAL   TECHNIQUE: Noncontrast CT images of the head with coronal and sagittal reconstructions. Axial noncontrast CT images of the cervical spine with coronal and sagittal reconstructed images.   FINDINGS: EXTRACRANIAL SOFT TISSUES: Unremarkable.   CALVARIUM: No depressed skull fracture. No destructive osseous lesion.   PARANASAL SINUSES/MASTOIDS: Fluid levels in the maxillary sinuses bilaterally. Mastoid air cells are aerated.   HEMORRHAGE: No acute intracranial hemorrhage.   BRAIN PARENCHYMA: Gray-white matter interfaces are preserved. No mass effect or midline shift. There are nonspecific scattered white matter hypodensities.   VENTRICLES and EXTRA-AXIAL SPACES: Normal size.   OTHER FINDINGS: None.   CERVICAL SPINE:   ALIGNMENT: Reversal of the normal cervical lordosis, which may be positional or related to spasm. VERTEBRAE: No acute loss of vertebral body height. DISC SPACE: Disc space narrowing C5/C6 SPINAL CANAL: Multilevel facet and uncovertebral arthropathy with varying degrees of neural foraminal stenosis. Prominent posterior disc osteophyte complex at C5/C6 and likely ossification along the posterior longitudinal ligament contributes to severe central narrowing. PREVERTEBRAL SOFT TISSUES:  No prevertebral soft tissue swelling. LUNG APICES: Partially visualized endotracheal and gastric tube.   OTHER FINDINGS: None.       No acute intracranial hemorrhage, mass effect or midline shift.   Nonspecific scattered white matter hypodensities favored to represent sequela of small vessel ischemia. Fluid levels in the maxillary sinuses bilaterally. Correlate for symptoms of sinusitis.   Cervical spondylosis without acute loss of vertebral body height or traumatic malalignment. Prominent posterior disc osteophyte complex at C5/C6 and likely ossification along the posterior longitudinal ligament contributes to severe central narrowing at this level.     MACRO: None.   Signed by: Evan Finkelstein 6/10/2024 11:36 PM Dictation workstation:   [id number]    CT cervical spine wo IV contrast    Result Date: 6/10/2024  Interpreted By:  Finkelstein, Evan, STUDY: CT HEAD WO IV CONTRAST; CT CERVICAL SPINE WO IV CONTRAST;  6/10/2024 10:27 pm   INDICATION: Signs/Symptoms:ams, ?fall.   COMPARISON: None.   ACCESSION NUMBER(S): [id number]; [id number]   ORDERING CLINICIAN: ALESSIA CHAHAL   TECHNIQUE: Noncontrast CT images of the head with coronal and sagittal reconstructions. Axial noncontrast CT images of the cervical spine with coronal and sagittal reconstructed images.   FINDINGS: EXTRACRANIAL SOFT TISSUES: Unremarkable.   CALVARIUM: No depressed skull fracture. No destructive osseous lesion.   PARANASAL SINUSES/MASTOIDS: Fluid levels in the maxillary sinuses bilaterally. Mastoid air cells are aerated.   HEMORRHAGE: No acute intracranial hemorrhage.   BRAIN PARENCHYMA: Gray-white matter interfaces are preserved. No mass effect or midline shift. There are nonspecific scattered white matter hypodensities.   VENTRICLES and EXTRA-AXIAL SPACES: Normal size.   OTHER FINDINGS: None.   CERVICAL SPINE:   ALIGNMENT: Reversal of the normal cervical lordosis, which may be positional or related to spasm. VERTEBRAE: No acute loss of  vertebral body height. DISC SPACE: Disc space narrowing C5/C6 SPINAL CANAL: Multilevel facet and uncovertebral arthropathy with varying degrees of neural foraminal stenosis. Prominent posterior disc osteophyte complex at C5/C6 and likely ossification along the posterior longitudinal ligament contributes to severe central narrowing. PREVERTEBRAL SOFT TISSUES: No prevertebral soft tissue swelling. LUNG APICES: Partially visualized endotracheal and gastric tube.   OTHER FINDINGS: None.       No acute intracranial hemorrhage, mass effect or midline shift.   Nonspecific scattered white matter hypodensities favored to represent sequela of small vessel ischemia. Fluid levels in the maxillary sinuses bilaterally. Correlate for symptoms of sinusitis.   Cervical spondylosis without acute loss of vertebral body height or traumatic malalignment. Prominent posterior disc osteophyte complex at C5/C6 and likely ossification along the posterior longitudinal ligament contributes to severe central narrowing at this level.     MACRO: None.   Signed by: Evan Finkelstein 6/10/2024 11:36 PM Dictation workstation:   [id number]    XR chest 1 view    Result Date: 6/10/2024  Interpreted By:  Mundo Arroyo, STUDY: XR CHEST 1 VIEW;  6/10/2024 9:51 pm   INDICATION: Signs/Symptoms:Intubation.   COMPARISON: 11/20/2020.   ACCESSION NUMBER(S): [id number]   ORDERING CLINICIAN: ALESSIA CHAHAL   FINDINGS: AP supine radiograph of the chest was provided.   Leads and pacer pad overlie the chest, partially obscuring the field-of-view.   Relation of the endotracheal tube to the patrice is difficult to assess but endotracheal tube appears likely low lying, suspected to reside roughly 1.8 cm above the patrice and retraction by roughly 2 cm suggested for optimal placement. Enteric tube approximates the esophagus and extends inferior to the diaphragm and extends beyond the inferior border of the field of view, with tip and side-port not seen.    CARDIOMEDIASTINAL SILHOUETTE: Cardiomediastinal silhouette is normal in size and configuration.   LUNGS: Lungs appear clear. No sizable pleural effusion. No pneumothorax.   ABDOMEN: No remarkable upper abdominal findings.   BONES: No acute osseous changes.       1.  No definite evidence of acute cardiopulmonary process. 2. Relation of the endotracheal tube to the patrice is difficult to assess but endotracheal tube appears likely low lying, suspected to reside roughly 1.8 cm above the patrice and retraction by roughly 2 cm suggested for optimal placement. Enteric tube approximates the esophagus and extends inferior to the diaphragm and extends beyond the inferior border of the field of view, with tip and side-port not seen.       MACRO: None   Signed by: Mundo Arroyo 6/10/2024 10:27 PM Dictation workstation:   [id number]        Radiology:     Transthoracic Echo (TTE) Complete   Final Result      Carotid duplex bilateral   Final Result   Utilizing the peak systolic velocities, the estimated degree of   stenosis within the internal carotid arteries is less than 50%   bilaterally.        MACRO:   None.             Signed by: Ru Jackson 6/11/2024 10:51 AM   Dictation workstation:   [id number]      CT head wo IV contrast   Final Result   No acute intracranial hemorrhage, mass effect or midline shift.        Nonspecific scattered white matter hypodensities favored to represent   sequela of small vessel ischemia. Fluid levels in the maxillary   sinuses bilaterally. Correlate for symptoms of sinusitis.        Cervical spondylosis without acute loss of vertebral body height or   traumatic malalignment. Prominent posterior disc osteophyte complex   at C5/C6 and likely ossification along the posterior longitudinal   ligament contributes to severe central narrowing at this level.             MACRO:   None.        Signed by: Evan Finkelstein 6/10/2024 11:36 PM   Dictation workstation:   [id number]      CT cervical spine wo IV  contrast   Final Result   No acute intracranial hemorrhage, mass effect or midline shift.        Nonspecific scattered white matter hypodensities favored to represent   sequela of small vessel ischemia. Fluid levels in the maxillary   sinuses bilaterally. Correlate for symptoms of sinusitis.        Cervical spondylosis without acute loss of vertebral body height or   traumatic malalignment. Prominent posterior disc osteophyte complex   at C5/C6 and likely ossification along the posterior longitudinal   ligament contributes to severe central narrowing at this level.             MACRO:   None.        Signed by: Evan Finkelstein 6/10/2024 11:36 PM   Dictation workstation:   [id number]      XR chest 1 view   Final Result   1.  No definite evidence of acute cardiopulmonary process.   2. Relation of the endotracheal tube to the patrice is difficult to   assess but endotracheal tube appears likely low lying, suspected to   reside roughly 1.8 cm above the patrice and retraction by roughly 2 cm   suggested for optimal placement. Enteric tube approximates the   esophagus and extends inferior to the diaphragm and extends beyond   the inferior border of the field of view, with tip and side-port not   seen.                  MACRO:   None        Signed by: Mundo Arroyo 6/10/2024 10:27 PM   Dictation workstation:   [id number]      XR chest 1 view    (Results Pending)   CT chest abdomen pelvis w IV contrast    (Results Pending)       Problem List:     Patient Active Problem List   Diagnosis    CMC arthritis    Adjustment disorder with depressed mood    Essential hypertension    Insomnia    Colon cancer screening    Unresponsive       Assessment:   Unresponsive  Hypertension  EtOH  Hyperlipidemia  Elevated lactate  Acute diastolic congestive heart failure      Plan:   Admitted to ICU, remains intubated and sedated.  Does wake up and follows simple commands appropriately.  Purposeful.  Pupils equal and reactive.  May need to rule out  seizure and get neurology workup.  Remains on telemetry.  Telemetry shows normal sinus rhythm with occasional PVCs.  Continue supplemental O2, keep SpO2 greater than 92%.  Plans for extubation today.  Monitor electrolytes, keep potassium greater than 4 and magnesium greater than 2  Gentle hydration, monitor strict I's and O's and daily weights  Echocardiogram shows slightly decreased LV function with an EF of 45% and mild tricuspid regurgitation.  No wall motion abnormalities were identified.  Low suspicion for ACS with negative troponins however I am concerned with the amount of PVCs seen on EKG dating back to February of this year.  I think we need to proceed with left heart catheterization to fully rule out any type of cardiac event.  Discussed with family.  Patient's family is agreeable to proceed with left heart catheterization.  N.p.o.  Plan for LHC tomorrow  If LHC is negative we will most likely DC with LifeVest versus Holter monitor on discharge.  Continue aspirin, statin and beta-blocker for now.  Check lipid panel.  Will eventually need GDMT for heart failure, will initiate slowly  Further recommendations to follow      Masood Corral [facility]  Adult Gerontology Acute Care Nurse Practitioner  [facility] [place]   [facility]  [phone]      Of note, this documentation is completed using the Dragon Dictation system (voice recognition software). There may be spelling and/or grammatical errors that were not corrected prior to final submission.      Electronically signed by ANDREW Rogers, on 6/11/2024 at 12:18 PM     Patient seen and examined in conjunction with BRE Logan/CNP and agree with the evaluation as noted above.  69-year-old lady with a history of bilateral breast cancer s/p lumpectomy and radiation therapy and chemotherapy, hypertension dyslipidemia diabetes who was admitted with an episode of unresponsiveness.  She  apparently drove home and was she got out of her car, she collapsed and was found unresponsive.  She was bagged and given some Narcan with no response and she was eventually intubated.  She had a quick turnaround and is currently extubated and awake and alert.  She had a relatively low potassium of 2.5 on hemolyzed blood as well as borderline low magnesium.  Alcohol level was noted to be elevated she denied any excessive alcohol consumption recently.  She also denied any recreational drug use.  She has no prior history of coronary artery disease.    Echocardiogram this admission did not show moderate LV dysfunction with EF of 45% with no gross valvular abnormalities.  EKG shows sinus rhythm with frequent PVCs, she also has frequent PVCs on the telemetry.    Agree with exam as noted above cardiac exam reveals regular first and second heart sound, there were no murmurs appreciated.  Chest reveals reduced breath sounds at the bases bilaterally.    ASSESSMENT AND PLAN:  1.  Sudden unresponsiveness: Etiology is unclear at this time whether she had a primary cardiac event.  However it is concerning that she has frequent PVCs on EKG dating back to February 2024.  In view of LV dysfunction and this finding, will recommend cardiac catheterization to rule out any significant obstructive CAD rule out ischemia as underlying substrate for her episodes.  Will make further recommendations based on the findings.  2.  Hypertension: Will continue to optimize blood pressure control.  3.  Frequent PVCs: If the cardiac catheterization is normal, we will consider EP evaluation as she may need further EP study to see if she has any inducible ventricular arrhythmias  AKA

## 2024-06-11 NOTE — CONSULTS
Vancomycin Dosing by Pharmacy- INITIAL    Myrna Calhoun is a 69 y.o. year old female who Pharmacy has been consulted for vancomycin dosing for other sepsis, unknown source . Based on the patient's indication and renal status this patient will be dosed based on a goal AUC of 500-600.     Renal function is currently stable.    Visit Vitals  /58   Pulse 70   Temp (!) 38.3 °C (101 °F) (Rectal)   Resp (!) 27        Lab Results   Component Value Date    CREATININE 0.95 2024    CREATININE 0.72 06/10/2024    CREATININE 1.27 (H) 2024    CREATININE 1.33 (H) 2024        Patient weight is as follows:   Vitals:    24 0532   Weight: 109 kg (239 lb 13.8 oz)       Cultures:  No results found for the encounter in last 14 days.        I/O last 3 completed shifts:  In: 806.5 (7.4 mL/kg) [I.V.:506.5 (4.7 mL/kg); IV Piggyback:300]  Out: 1015 (9.3 mL/kg) [Urine:715 (0.2 mL/kg/hr); Emesis/NG output:300]  Weight: 108.8 kg   I/O during current shift:  I/O this shift:  In: -   Out: 510 [Urine:510]    Temp (24hrs), Av.1 °C (97 °F), Min:35.3 °C (95.5 °F), Max:38.3 °C (101 °F)         Assessment/Plan     Patient will not be given a loading dose.  Will initiate vancomycin maintenance,  1000 mg every 12 hours.    This dosing regimen is predicted by [employer] to result in the following pharmacokinetic parameters:  Loading dose: N/A  Regimen: 1250 mg IV every 12 hours.  Start time: 13:40 on 2024  Exposure target: AUC24 (range)400-600 mg/L.hr   AUC24,ss: 619 mg/L.hr  Probability of AUC24 > 400: 90 %  Ctrough,ss: 21 mg/L  Probability of Ctrough,ss > 20: 55 %  Probability of nephrotoxicity (Lodise JOSH ): 19 %    Follow-up level will be ordered on 24 at 2200 and 24 at 1000, unless clinically indicated sooner.  Will continue to monitor renal function daily while on vancomycin and order serum creatinine at least every 48 hours if not already ordered.  Follow for continued vancomycin needs, clinical  response, and signs/symptoms of toxicity.       Lillie Hall, PharmD

## 2024-06-11 NOTE — ED PROVIDER NOTES
HPI   Chief Complaint   Patient presents with    Altered Mental Status     Unresponsive +ETOH. Unknown drug use. Bagged by EMS en route and upon arrival to ER pt still unresponsive. 2mg narcan IN given by EMS. 4mg narcan IM given by EMS.        Patient is a elderly female presented to the emergency department via EMS with altered mentation.  EMS states they received a call for man down from the patient lying in the her front yard.  Patient was with friends were unable to provide any further information.  Questionable alcohol use.  Possible drug use.  EMS reports that the patient received 6 mg of Narcan.  Patient was being assisted with respirations via BVM on arrival.  On arrival patient unresponsive GCS of 3.      History provided by:  EMS personnel  History limited by:  Patient unresponsive and intubated                      [place]uttle Coma Scale Score: 3                     Patient History   Past Medical History:   Diagnosis Date    Breast cancer (Multi) 2019    Diabetes (Multi)     Hyperlipidemia     Hypertension      Past Surgical History:   Procedure Laterality Date    TOTAL KNEE ARTHROPLASTY Bilateral      Family History   Problem Relation Name Age of Onset    No Known Problems Mother       Social History     Tobacco Use    Smoking status: Never    Smokeless tobacco: Never   Vaping Use    Vaping status: Never Used   Substance Use Topics    Alcohol use: Not Currently    Drug use: Never       Physical Exam   ED Triage Vitals [06/10/24 2143]   Temperature Heart Rate Respirations BP   36.5 °C (97.7 °F) (!) 110 (!) 4 (!) 148/99      Pulse Ox Temp Source Heart Rate Source Patient Position   96 % Temporal Monitor Lying      BP Location FiO2 (%)     Right arm --       Physical Exam  Vitals and nursing note reviewed.   Constitutional:       General: She is in acute distress.      Appearance: Normal appearance. She is ill-appearing.   HENT:      Head: Normocephalic and atraumatic.      Nose: Nose normal.       Mouth/Throat:      Mouth: Mucous membranes are moist.      Pharynx: No oropharyngeal exudate or posterior oropharyngeal erythema.   Eyes:      General: No scleral icterus.     Pupils: Pupils are equal, round, and reactive to light.   Cardiovascular:      Rate and Rhythm: Regular rhythm. Tachycardia present.      Pulses: Normal pulses.      Heart sounds: Normal heart sounds. No murmur heard.     No friction rub. No gallop.   Pulmonary:      Effort: Respiratory distress present.      Breath sounds: No stridor. Rales present. No wheezing or rhonchi.   Chest:      Chest wall: No tenderness.   Abdominal:      General: Abdomen is flat. There is no distension.      Palpations: Abdomen is soft. There is no mass.      Hernia: No hernia is present.   Musculoskeletal:         General: No swelling, deformity or signs of injury.   Skin:     General: Skin is warm and dry.      Capillary Refill: Capillary refill takes less than 2 seconds.      Coloration: Skin is not jaundiced or pale.      Findings: No bruising, erythema, lesion or rash.   Neurological:      General: No focal deficit present.      Mental Status: She is unresponsive.      GCS: GCS eye subscore is 1. GCS verbal subscore is 1. GCS motor subscore is 1.         ED Course & MDM   Diagnoses as of 06/11/24 0011   Unresponsive   Hypokalemia   Elevated lactic acid level   Alcoholic intoxication with complication (CMS-HCC)       Medical Decision Making  Patient is 69-year-old female presenting to the emergency department via EMS unresponsive.  Patient was having respirations assisted via BVM.  Patient had diffuse crackles on auscultation of her lung sounds and sonorous respirations.  Patient was immediately intubated and labs and head CT were ordered. Propofol and fentanyl ordered for pain and sedation.    ABG reviewed showing respiratory acidosis.  Ventilator settings were changed to increased respiratory rate.  Metabolic panel with hypokalemia and replacement was ordered.   CBC without concerning findings.  Patient's lactate was elevated and IV fluids were ordered.  Magnesium levels normal.  Troponin is negative.  Serum tox screen shows an alcohol level of 167 with negative Tylenol and aspirin levels.  Urine drug screen is negative.  Urinalysis not indicative of infection.  Chest x-ray without any acute cardiopulmonary processes.  Family arrived at bedside and stated the patient had reportedly come home got out of her car passed out in her front lawn.  Uncertain what patient was doing prior to arrival at home. CT head negative for acute intracranial processes.  CT C-spine negative for acute fractures or subluxations.  Due to patient's unresponsiveness requiring ventilator support admission to the ICU was recommended.  ICU team was contacted and case discussed the patient was accepted for admission.  Family was updated on lab and imaging findings and current care plan and they are in agreement with this.    Amount and/or Complexity of Data Reviewed  Labs: ordered. Decision-making details documented in ED Course.  Radiology: ordered. Decision-making details documented in ED Course.  ECG/medicine tests: independent interpretation performed.     Details: 2155 hrs.: Sinus rhythm with a ventricular rate of 94 bpm.  QRS in 194 ms.  QTc 510 ms.  Unifocal PVCs noted.  2321 hrs.: Sinus rhythm with a ventricular rate of 68 bpm.  QRS interval 88 ms.  QTc 531 ms.  Normal axis.  PVCs present.      Labs Reviewed   BLOOD GAS ARTERIAL - Abnormal       Result Value    POCT pH, Arterial 7.26 (*)     POCT pCO2, Arterial 46 (*)     POCT pO2, Arterial 98 (*)     POCT SO2, Arterial 97      POCT Oxy Hemoglobin, Arterial 96.8      POCT Base Excess, Arterial -6.4 (*)     POCT HCO3 Calculated, Arterial 20.6 (*)     Patient Temperature        FiO2 60      Critical Called By RT CATHY      Critical Called To DR CHAHAL      Critical Call Time 2240      Critical Read Back Y     BLOOD GAS ARTERIAL FULL PANEL -  Abnormal    POCT pH, Arterial 7.26 (*)     POCT pCO2, Arterial 46 (*)     POCT pO2, Arterial 98 (*)     POCT SO2, Arterial 97      POCT Oxy Hemoglobin, Arterial 96.8      POCT Hematocrit Calculated, Arterial 37.0      POCT Sodium, Arterial 140      POCT Potassium, Arterial 2.9 (*)     POCT Chloride, Arterial 107      POCT Ionized Calcium, Arterial 1.18      POCT Glucose, Arterial 145 (*)     POCT Lactate, Arterial 4.4 (*)     POCT Base Excess, Arterial -6.4 (*)     POCT HCO3 Calculated, Arterial 20.6 (*)     POCT Hemoglobin, Arterial 12.3      POCT Anion Gap, Arterial 15      Patient Temperature        FiO2 60      Critical Called By RT KD      Critical Called To DR CHAHAL      Critical Call Time 2240      Critical Read Back Y     COMPREHENSIVE METABOLIC PANEL - Abnormal    Glucose 124 (*)     Sodium 143      Potassium 2.5 (*)     Chloride 116 (*)     Bicarbonate 18 (*)     Anion Gap 12      Urea Nitrogen 16      Creatinine 0.72      eGFR >90      Calcium 6.7 (*)     Albumin 3.1 (*)     Alkaline Phosphatase 41      Total Protein 5.5 (*)     AST 35      Bilirubin, Total 0.4      ALT 36     LACTATE - Abnormal    Lactate 4.2 (*)     Narrative:     Venipuncture immediately after or during the administration of Metamizole may lead to falsely low results. Testing should be performed immediately  prior to Metamizole dosing.   B-TYPE NATRIURETIC PEPTIDE - Abnormal     (*)     Narrative:        <100 pg/mL - Heart failure unlikely  100-299 pg/mL - Intermediate probability of acute heart                  failure exacerbation. Correlate with clinical                  context and patient history.    >=300 pg/mL - Heart Failure likely. Correlate with clinical                  context and patient history.    BNP testing is performed using different testing methodology at [facility] than at other [facility] hospitals. Direct result comparisons should only be made within the same method.      ACUTE TOXICOLOGY  PANEL, BLOOD - Abnormal    Acetaminophen <10.0      Salicylate  <3      Alcohol 167 (*)    URINALYSIS WITH REFLEX CULTURE AND MICROSCOPIC - Abnormal    Color, Urine Yellow      Appearance, Urine Hazy (*)     Specific Gravity, Urine 1.016      pH, Urine 5.0      Protein, Urine 100 (2+) (*)     Glucose, Urine NEGATIVE      Blood, Urine SMALL (1+) (*)     Ketones, Urine NEGATIVE      Bilirubin, Urine NEGATIVE      Urobilinogen, Urine <2.0      Nitrite, Urine NEGATIVE      Leukocyte Esterase, Urine NEGATIVE     URINALYSIS MICROSCOPIC WITH REFLEX CULTURE - Abnormal    WBC, Urine 1-5      RBC, Urine 3-5      Squamous Epithelial Cells, Urine 10-25 (FEW)      Mucus, Urine 2+      Hyaline Casts, Urine 4+ (*)    MAGNESIUM - Normal    Magnesium 1.69     PROTIME-INR - Normal    Protime 11.7      INR 1.0     DRUG SCREEN, URINE WITH REFLEX TO CONFIRMATION - Normal    Amphetamine Screen, Urine Presumptive Negative      Barbiturate Screen, Urine Presumptive Negative      Benzodiazepines Screen, Urine Presumptive Negative      Cannabinoid Screen, Urine Presumptive Negative      Cocaine Metabolite Screen, Urine Presumptive Negative      Fentanyl Screen, Urine Presumptive Negative      Opiate Screen, Urine Presumptive Negative      Oxycodone Screen, Urine Presumptive Negative      PCP Screen, Urine Presumptive Negative      Methadone Screen, Urine Presumptive Negative      Narrative:     Drug screen results are presumptive and should not be used to assess   compliance with prescribed medication. Definitive confirmatory drug testing   has been added to this sample for any positive screen result and will be  reported separately.     Toxicology screening results are reported qualitatively. The concentration must  be greater than or equal to the cutoff to be reported as positive. The concentration   at which the screening test can detect an individual drug or metabolite varies.   The absence of expected drug(s) and/or drug metabolite(s)  may indicate non-compliance,   inappropriate timing of specimen collection relative to drug administration, poor drug   absorption, diluted/adulterated urine, or limitations of testing. For medical purposes   only; not valid for forensic use.    Interpretive questions should be directed to the laboratory medical directors.   AMMONIA - Normal    Ammonia 36     SARS-COV-2 PCR - Normal    Coronavirus 2019, PCR Not Detected      Narrative:     This assay has received FDA Emergency Use Authorization (EUA) and is only authorized for the duration of time that circumstances exist to justify the authorization of the emergency use of in vitro diagnostic tests for the detection of SARS-CoV-2 virus and/or diagnosis of COVID-19 infection under section 564(b)(1) of the Act, 21 U.S.C. 360bbb-3(b)(1). This assay is an in vitro diagnostic nucleic acid amplification test for the qualitative detection of SARS-CoV-2 from nasopharyngeal specimens and has been validated for use at [facility] [place]. Negative results do not preclude COVID-19 infections and should not be used as the sole basis for diagnosis, treatment, or other management decisions.     SERIAL TROPONIN-INITIAL - Normal    Troponin I, High Sensitivity 8      Narrative:     Less than 99th percentile of normal range cutoff-  Female and children under 18 years old <14 ng/L; Male <21 ng/L: Negative  Repeat testing should be performed if clinically indicated.     Female and children under 18 years old 14-50 ng/L; Male 21-50 ng/L:  Consistent with possible cardiac damage and possible increased clinical   risk. Serial measurements may help to assess extent of myocardial damage.     >50 ng/L: Consistent with cardiac damage, increased clinical risk and  myocardial infarction. Serial measurements may help assess extent of   myocardial damage.      NOTE: Children less than 1 year old may have higher baseline troponin   levels and results should be interpreted in  conjunction with the overall   clinical context.     NOTE: Troponin I testing is performed using a different   testing methodology at [facility] than at other   [facility]. Direct result comparisons should only   be made within the same method.   SERIAL TROPONIN, 1 HOUR - Normal    Troponin I, High Sensitivity 8      Narrative:     Less than 99th percentile of normal range cutoff-  Female and children under 18 years old <14 ng/L; Male <21 ng/L: Negative  Repeat testing should be performed if clinically indicated.     Female and children under 18 years old 14-50 ng/L; Male 21-50 ng/L:  Consistent with possible cardiac damage and possible increased clinical   risk. Serial measurements may help to assess extent of myocardial damage.     >50 ng/L: Consistent with cardiac damage, increased clinical risk and  myocardial infarction. Serial measurements may help assess extent of   myocardial damage.      NOTE: Children less than 1 year old may have higher baseline troponin   levels and results should be interpreted in conjunction with the overall   clinical context.     NOTE: Troponin I testing is performed using a different   testing methodology at [facility] than at other   [facility]. Direct result comparisons should only   be made within the same method.   TROPONIN SERIES- (INITIAL, 1 HR)    Narrative:     The following orders were created for panel order Troponin I Series, High Sensitivity (0, 1 HR).  Procedure                               Abnormality         Status                     ---------                               -----------         ------                     Troponin I, High Sensiti...[[id number]]  Normal              Final result               Troponin, High Sensitivi...[[id number]]  Normal              Final result                 Please view results for these tests on the individual orders.   CBC WITH AUTO DIFFERENTIAL    WBC 6.2      nRBC 0.0      RBC 4.11       Hemoglobin 13.3      Hematocrit 41.2            MCH 32.4      MCHC 32.3      RDW 13.7      Platelets 296      Neutrophils % 29.1      Immature Granulocytes %, Automated 0.3      Lymphocytes % 59.3      Monocytes % 10.5      Eosinophils % 0.5      Basophils % 0.3      Neutrophils Absolute 1.79      Immature Granulocytes Absolute, Automated 0.02      Lymphocytes Absolute 3.66      Monocytes Absolute 0.65      Eosinophils Absolute 0.03      Basophils Absolute 0.02     URINALYSIS WITH REFLEX CULTURE AND MICROSCOPIC    Narrative:     The following orders were created for panel order Urinalysis with Reflex Culture and Microscopic.  Procedure                               Abnormality         Status                     ---------                               -----------         ------                     Urinalysis with Reflex C...[[id number]]  Abnormal            Final result               Extra Urine Gray Tube[[id number]]                            In process                   Please view results for these tests on the individual orders.   EXTRA URINE GRAY TUBE   LACTATE   POTASSIUM   BLOOD GAS LACTIC ACID, VENOUS     CT head wo IV contrast   Final Result   No acute intracranial hemorrhage, mass effect or midline shift.        Nonspecific scattered white matter hypodensities favored to represent   sequela of small vessel ischemia. Fluid levels in the maxillary   sinuses bilaterally. Correlate for symptoms of sinusitis.        Cervical spondylosis without acute loss of vertebral body height or   traumatic malalignment. Prominent posterior disc osteophyte complex   at C5/C6 and likely ossification along the posterior longitudinal   ligament contributes to severe central narrowing at this level.             MACRO:   None.        Signed by: Evan Finkelstein 6/10/2024 11:36 PM   Dictation workstation:   [id number]      CT cervical spine wo IV contrast   Final Result   No acute intracranial hemorrhage, mass effect or  midline shift.        Nonspecific scattered white matter hypodensities favored to represent   sequela of small vessel ischemia. Fluid levels in the maxillary   sinuses bilaterally. Correlate for symptoms of sinusitis.        Cervical spondylosis without acute loss of vertebral body height or   traumatic malalignment. Prominent posterior disc osteophyte complex   at C5/C6 and likely ossification along the posterior longitudinal   ligament contributes to severe central narrowing at this level.             MACRO:   None.        Signed by: Evan Finkelstein 6/10/2024 11:36 PM   Dictation workstation:   [id number]      XR chest 1 view   Final Result   1.  No definite evidence of acute cardiopulmonary process.   2. Relation of the endotracheal tube to the patrice is difficult to   assess but endotracheal tube appears likely low lying, suspected to   reside roughly 1.8 cm above the patrice and retraction by roughly 2 cm   suggested for optimal placement. Enteric tube approximates the   esophagus and extends inferior to the diaphragm and extends beyond   the inferior border of the field of view, with tip and side-port not   seen.                  MACRO:   None        Signed by: Mundo Arroyo 6/10/2024 10:27 PM   Dictation workstation:   [id number]            Procedure  Intubation    Performed by: Teofilo Gomez DO  Authorized by: Teofilo Gomez DO    Consent:     Consent obtained:  Emergent situation  Universal protocol:     Patient identity confirmed:  Anonymous protocol, patient vented/unresponsive  Pre-procedure details:     Indications: airway protection and altered consciousness      Patient status:  Unresponsive    Induction agents:  Etomidate    Paralytics:  Rocuronium  Procedure details:     Preoxygenation:  Bag valve mask    CPR in progress: no      Number of attempts:  1  Successful intubation attempt details:     Intubation method:  Oral    Intubation technique: video assisted      Laryngoscope blade:   Hypercurved    Bougie used: no      Grade view: II      Tube size (mm):  7.5    Tube type:  Cuffed    Tube visualized through cords: yes    Placement assessment:     ETT at teeth/gumline (cm):  23    Tube secured with:  ETT lama    Breath sounds:  Equal    Placement verification: chest rise, colorimetric ETCO2, CXR verification, direct visualization and equal breath sounds      CXR findings:  Low    Tube repositioned: yes    Post-procedure details:     Procedure completion:  Tolerated  Critical Care    Performed by: Teofilo Gomez DO  Authorized by: Teofilo Gomez DO    Critical care provider statement:     Critical care time (minutes):  45    Critical care time was exclusive of:  Separately billable procedures and treating other patients    Critical care was necessary to treat or prevent imminent or life-threatening deterioration of the following conditions:  Toxidrome and CNS failure or compromise    Critical care was time spent personally by me on the following activities:  Development of treatment plan with patient or surrogate, evaluation of patient's response to treatment, examination of patient, ventilator management, review of old charts, re-evaluation of patient's condition, pulse oximetry, ordering and review of radiographic studies, ordering and review of laboratory studies, obtaining history from patient or surrogate and ordering and performing treatments and interventions    Care discussed with: admitting provider         Teofilo Gomez DO  06/11/24 0011

## 2024-06-12 LAB
ALBUMIN SERPL BCP-MCNC: 3.4 G/DL (ref 3.4–5)
ALP SERPL-CCNC: 40 U/L (ref 33–136)
ALT SERPL W P-5'-P-CCNC: 38 U/L (ref 7–45)
ANION GAP SERPL CALC-SCNC: 10 MMOL/L (ref 10–20)
ANION GAP SERPL CALC-SCNC: 9 MMOL/L (ref 10–20)
AST SERPL W P-5'-P-CCNC: 31 U/L (ref 9–39)
BASOPHILS # BLD AUTO: 0.01 X10*3/UL (ref 0–0.1)
BASOPHILS NFR BLD AUTO: 0.1 %
BILIRUB SERPL-MCNC: 1.3 MG/DL (ref 0–1.2)
BUN SERPL-MCNC: 15 MG/DL (ref 6–23)
BUN SERPL-MCNC: 18 MG/DL (ref 6–23)
CALCIUM SERPL-MCNC: 8.3 MG/DL (ref 8.6–10.3)
CALCIUM SERPL-MCNC: 8.5 MG/DL (ref 8.6–10.3)
CHLORIDE SERPL-SCNC: 107 MMOL/L (ref 98–107)
CHLORIDE SERPL-SCNC: 108 MMOL/L (ref 98–107)
CO2 SERPL-SCNC: 24 MMOL/L (ref 21–32)
CO2 SERPL-SCNC: 26 MMOL/L (ref 21–32)
CREAT SERPL-MCNC: 1.03 MG/DL (ref 0.5–1.05)
CREAT SERPL-MCNC: 1.07 MG/DL (ref 0.5–1.05)
EGFRCR SERPLBLD CKD-EPI 2021: 56 ML/MIN/1.73M*2
EGFRCR SERPLBLD CKD-EPI 2021: 59 ML/MIN/1.73M*2
EOSINOPHIL # BLD AUTO: 0 X10*3/UL (ref 0–0.7)
EOSINOPHIL NFR BLD AUTO: 0 %
ERYTHROCYTE [DISTWIDTH] IN BLOOD BY AUTOMATED COUNT: 14.1 % (ref 11.5–14.5)
FLUAV RNA RESP QL NAA+PROBE: NOT DETECTED
FLUBV RNA RESP QL NAA+PROBE: NOT DETECTED
GLUCOSE BLD MANUAL STRIP-MCNC: 111 MG/DL (ref 74–99)
GLUCOSE BLD MANUAL STRIP-MCNC: 113 MG/DL (ref 74–99)
GLUCOSE BLD MANUAL STRIP-MCNC: 138 MG/DL (ref 74–99)
GLUCOSE BLD MANUAL STRIP-MCNC: 140 MG/DL (ref 74–99)
GLUCOSE SERPL-MCNC: 124 MG/DL (ref 74–99)
GLUCOSE SERPL-MCNC: 130 MG/DL (ref 74–99)
HCT VFR BLD AUTO: 34.6 % (ref 36–46)
HGB BLD-MCNC: 11.2 G/DL (ref 12–16)
IMM GRANULOCYTES # BLD AUTO: 0.04 X10*3/UL (ref 0–0.7)
IMM GRANULOCYTES NFR BLD AUTO: 0.4 % (ref 0–0.9)
LYMPHOCYTES # BLD AUTO: 1.71 X10*3/UL (ref 1.2–4.8)
LYMPHOCYTES NFR BLD AUTO: 16.2 %
MAGNESIUM SERPL-MCNC: 2.54 MG/DL (ref 1.6–2.4)
MAGNESIUM SERPL-MCNC: 2.61 MG/DL (ref 1.6–2.4)
MCH RBC QN AUTO: 32.1 PG (ref 26–34)
MCHC RBC AUTO-ENTMCNC: 32.4 G/DL (ref 32–36)
MCV RBC AUTO: 99 FL (ref 80–100)
MONOCYTES # BLD AUTO: 0.94 X10*3/UL (ref 0.1–1)
MONOCYTES NFR BLD AUTO: 8.9 %
NEUTROPHILS # BLD AUTO: 7.88 X10*3/UL (ref 1.2–7.7)
NEUTROPHILS NFR BLD AUTO: 74.4 %
NRBC BLD-RTO: 0 /100 WBCS (ref 0–0)
PHOSPHATE SERPL-MCNC: 2.1 MG/DL (ref 2.5–4.9)
PLATELET # BLD AUTO: 227 X10*3/UL (ref 150–450)
POTASSIUM SERPL-SCNC: 3.9 MMOL/L (ref 3.5–5.3)
POTASSIUM SERPL-SCNC: 4.1 MMOL/L (ref 3.5–5.3)
PROT SERPL-MCNC: 6.1 G/DL (ref 6.4–8.2)
RBC # BLD AUTO: 3.49 X10*6/UL (ref 4–5.2)
SODIUM SERPL-SCNC: 138 MMOL/L (ref 136–145)
SODIUM SERPL-SCNC: 138 MMOL/L (ref 136–145)
VANCOMYCIN SERPL-MCNC: 10.4 UG/ML (ref 5–20)
WBC # BLD AUTO: 10.6 X10*3/UL (ref 4.4–11.3)

## 2024-06-12 PROCEDURE — 1200000002 HC GENERAL ROOM WITH TELEMETRY DAILY

## 2024-06-12 PROCEDURE — C1887 CATHETER, GUIDING: HCPCS | Performed by: STUDENT IN AN ORGANIZED HEALTH CARE EDUCATION/TRAINING PROGRAM

## 2024-06-12 PROCEDURE — 82947 ASSAY GLUCOSE BLOOD QUANT: CPT

## 2024-06-12 PROCEDURE — 2500000004 HC RX 250 GENERAL PHARMACY W/ HCPCS (ALT 636 FOR OP/ED): Performed by: STUDENT IN AN ORGANIZED HEALTH CARE EDUCATION/TRAINING PROGRAM

## 2024-06-12 PROCEDURE — 2500000004 HC RX 250 GENERAL PHARMACY W/ HCPCS (ALT 636 FOR OP/ED): Performed by: NURSE PRACTITIONER

## 2024-06-12 PROCEDURE — 82947 ASSAY GLUCOSE BLOOD QUANT: CPT | Mod: 91

## 2024-06-12 PROCEDURE — 36415 COLL VENOUS BLD VENIPUNCTURE: CPT | Performed by: HOSPITALIST

## 2024-06-12 PROCEDURE — 80202 ASSAY OF VANCOMYCIN: CPT | Performed by: STUDENT IN AN ORGANIZED HEALTH CARE EDUCATION/TRAINING PROGRAM

## 2024-06-12 PROCEDURE — 2550000001 HC RX 255 CONTRASTS: Performed by: STUDENT IN AN ORGANIZED HEALTH CARE EDUCATION/TRAINING PROGRAM

## 2024-06-12 PROCEDURE — 85025 COMPLETE CBC W/AUTO DIFF WBC: CPT | Performed by: HOSPITALIST

## 2024-06-12 PROCEDURE — 93458 L HRT ARTERY/VENTRICLE ANGIO: CPT | Performed by: STUDENT IN AN ORGANIZED HEALTH CARE EDUCATION/TRAINING PROGRAM

## 2024-06-12 PROCEDURE — C9113 INJ PANTOPRAZOLE SODIUM, VIA: HCPCS | Performed by: HOSPITALIST

## 2024-06-12 PROCEDURE — 36415 COLL VENOUS BLD VENIPUNCTURE: CPT | Performed by: STUDENT IN AN ORGANIZED HEALTH CARE EDUCATION/TRAINING PROGRAM

## 2024-06-12 PROCEDURE — 83735 ASSAY OF MAGNESIUM: CPT | Mod: 91 | Performed by: STUDENT IN AN ORGANIZED HEALTH CARE EDUCATION/TRAINING PROGRAM

## 2024-06-12 PROCEDURE — 99152 MOD SED SAME PHYS/QHP 5/>YRS: CPT | Performed by: STUDENT IN AN ORGANIZED HEALTH CARE EDUCATION/TRAINING PROGRAM

## 2024-06-12 PROCEDURE — 84100 ASSAY OF PHOSPHORUS: CPT | Performed by: STUDENT IN AN ORGANIZED HEALTH CARE EDUCATION/TRAINING PROGRAM

## 2024-06-12 PROCEDURE — 99291 CRITICAL CARE FIRST HOUR: CPT

## 2024-06-12 PROCEDURE — B2111ZZ FLUOROSCOPY OF MULTIPLE CORONARY ARTERIES USING LOW OSMOLAR CONTRAST: ICD-10-PCS | Performed by: STUDENT IN AN ORGANIZED HEALTH CARE EDUCATION/TRAINING PROGRAM

## 2024-06-12 PROCEDURE — 2500000001 HC RX 250 WO HCPCS SELF ADMINISTERED DRUGS (ALT 637 FOR MEDICARE OP)

## 2024-06-12 PROCEDURE — 99233 SBSQ HOSP IP/OBS HIGH 50: CPT | Performed by: NURSE PRACTITIONER

## 2024-06-12 PROCEDURE — 82374 ASSAY BLOOD CARBON DIOXIDE: CPT | Performed by: STUDENT IN AN ORGANIZED HEALTH CARE EDUCATION/TRAINING PROGRAM

## 2024-06-12 PROCEDURE — 4A023N7 MEASUREMENT OF CARDIAC SAMPLING AND PRESSURE, LEFT HEART, PERCUTANEOUS APPROACH: ICD-10-PCS | Performed by: STUDENT IN AN ORGANIZED HEALTH CARE EDUCATION/TRAINING PROGRAM

## 2024-06-12 PROCEDURE — 2500000001 HC RX 250 WO HCPCS SELF ADMINISTERED DRUGS (ALT 637 FOR MEDICARE OP): Performed by: NURSE PRACTITIONER

## 2024-06-12 PROCEDURE — 2500000005 HC RX 250 GENERAL PHARMACY W/O HCPCS: Performed by: STUDENT IN AN ORGANIZED HEALTH CARE EDUCATION/TRAINING PROGRAM

## 2024-06-12 PROCEDURE — 80053 COMPREHEN METABOLIC PANEL: CPT | Performed by: HOSPITALIST

## 2024-06-12 PROCEDURE — C1894 INTRO/SHEATH, NON-LASER: HCPCS | Performed by: STUDENT IN AN ORGANIZED HEALTH CARE EDUCATION/TRAINING PROGRAM

## 2024-06-12 PROCEDURE — 2720000007 HC OR 272 NO HCPCS: Performed by: STUDENT IN AN ORGANIZED HEALTH CARE EDUCATION/TRAINING PROGRAM

## 2024-06-12 PROCEDURE — 2500000002 HC RX 250 W HCPCS SELF ADMINISTERED DRUGS (ALT 637 FOR MEDICARE OP, ALT 636 FOR OP/ED): Mod: MUE | Performed by: HOSPITALIST

## 2024-06-12 PROCEDURE — 2500000004 HC RX 250 GENERAL PHARMACY W/ HCPCS (ALT 636 FOR OP/ED): Performed by: HOSPITALIST

## 2024-06-12 PROCEDURE — 2500000002 HC RX 250 W HCPCS SELF ADMINISTERED DRUGS (ALT 637 FOR MEDICARE OP, ALT 636 FOR OP/ED): Mod: MUE | Performed by: NURSE PRACTITIONER

## 2024-06-12 PROCEDURE — 83735 ASSAY OF MAGNESIUM: CPT | Performed by: HOSPITALIST

## 2024-06-12 RX ORDER — SPIRONOLACTONE 25 MG/1
12.5 TABLET ORAL DAILY
Status: DISCONTINUED | OUTPATIENT
Start: 2024-06-12 | End: 2024-06-13 | Stop reason: HOSPADM

## 2024-06-12 RX ORDER — LOSARTAN POTASSIUM 25 MG/1
25 TABLET ORAL DAILY
Status: DISCONTINUED | OUTPATIENT
Start: 2024-06-12 | End: 2024-06-13 | Stop reason: HOSPADM

## 2024-06-12 RX ORDER — FENTANYL CITRATE 50 UG/ML
INJECTION, SOLUTION INTRAMUSCULAR; INTRAVENOUS AS NEEDED
Status: DISCONTINUED | OUTPATIENT
Start: 2024-06-12 | End: 2024-06-12 | Stop reason: HOSPADM

## 2024-06-12 RX ORDER — METOPROLOL SUCCINATE 50 MG/1
50 TABLET, EXTENDED RELEASE ORAL DAILY
Status: DISCONTINUED | OUTPATIENT
Start: 2024-06-12 | End: 2024-06-13 | Stop reason: HOSPADM

## 2024-06-12 RX ORDER — POLYETHYLENE GLYCOL 3350 17 G/17G
17 POWDER, FOR SOLUTION ORAL DAILY PRN
Status: DISCONTINUED | OUTPATIENT
Start: 2024-06-12 | End: 2024-06-13 | Stop reason: HOSPADM

## 2024-06-12 RX ORDER — LIDOCAINE HYDROCHLORIDE 20 MG/ML
INJECTION, SOLUTION INFILTRATION; PERINEURAL AS NEEDED
Status: DISCONTINUED | OUTPATIENT
Start: 2024-06-12 | End: 2024-06-12 | Stop reason: HOSPADM

## 2024-06-12 RX ORDER — SODIUM CHLORIDE 9 MG/ML
100 INJECTION, SOLUTION INTRAVENOUS CONTINUOUS
Status: ACTIVE | OUTPATIENT
Start: 2024-06-12 | End: 2024-06-12

## 2024-06-12 RX ORDER — NICARDIPINE HYDROCHLORIDE 2.5 MG/ML
INJECTION INTRAVENOUS AS NEEDED
Status: DISCONTINUED | OUTPATIENT
Start: 2024-06-12 | End: 2024-06-12 | Stop reason: HOSPADM

## 2024-06-12 RX ORDER — MIDAZOLAM HYDROCHLORIDE 1 MG/ML
INJECTION, SOLUTION INTRAMUSCULAR; INTRAVENOUS AS NEEDED
Status: DISCONTINUED | OUTPATIENT
Start: 2024-06-12 | End: 2024-06-12 | Stop reason: HOSPADM

## 2024-06-12 RX ORDER — POTASSIUM CHLORIDE 20 MEQ/1
20 TABLET, EXTENDED RELEASE ORAL ONCE
Status: COMPLETED | OUTPATIENT
Start: 2024-06-12 | End: 2024-06-12

## 2024-06-12 RX ORDER — MAGNESIUM SULFATE HEPTAHYDRATE 40 MG/ML
2 INJECTION, SOLUTION INTRAVENOUS ONCE
Status: COMPLETED | OUTPATIENT
Start: 2024-06-12 | End: 2024-06-12

## 2024-06-12 RX ADMIN — SODIUM CHLORIDE, PRESERVATIVE FREE 10 ML: 5 INJECTION INTRAVENOUS at 04:48

## 2024-06-12 RX ADMIN — LOSARTAN POTASSIUM 25 MG: 25 TABLET, FILM COATED ORAL at 11:20

## 2024-06-12 RX ADMIN — ASPIRIN 81 MG: 81 TABLET, COATED ORAL at 08:27

## 2024-06-12 RX ADMIN — PIPERACILLIN SODIUM AND TAZOBACTAM SODIUM 3.38 G: 3; .375 INJECTION, SOLUTION INTRAVENOUS at 18:39

## 2024-06-12 RX ADMIN — PANTOPRAZOLE SODIUM 40 MG: 40 INJECTION, POWDER, FOR SOLUTION INTRAVENOUS at 08:27

## 2024-06-12 RX ADMIN — SODIUM CHLORIDE, PRESERVATIVE FREE 10 ML: 5 INJECTION INTRAVENOUS at 11:23

## 2024-06-12 RX ADMIN — VANCOMYCIN HYDROCHLORIDE 1000 MG: 1 INJECTION, SOLUTION INTRAVENOUS at 03:04

## 2024-06-12 RX ADMIN — METOPROLOL TARTRATE 25 MG: 25 TABLET, FILM COATED ORAL at 06:37

## 2024-06-12 RX ADMIN — SPIRONOLACTONE 12.5 MG: 25 TABLET ORAL at 11:20

## 2024-06-12 RX ADMIN — PIPERACILLIN SODIUM AND TAZOBACTAM SODIUM 3.38 G: 3; .375 INJECTION, SOLUTION INTRAVENOUS at 11:21

## 2024-06-12 RX ADMIN — ATORVASTATIN CALCIUM 40 MG: 20 TABLET, FILM COATED ORAL at 20:12

## 2024-06-12 RX ADMIN — EMPAGLIFLOZIN 10 MG: 10 TABLET, FILM COATED ORAL at 11:21

## 2024-06-12 RX ADMIN — METOPROLOL SUCCINATE 50 MG: 50 TABLET, EXTENDED RELEASE ORAL at 16:09

## 2024-06-12 RX ADMIN — SODIUM CHLORIDE 100 ML/HR: 9 INJECTION, SOLUTION INTRAVENOUS at 15:30

## 2024-06-12 RX ADMIN — MAGNESIUM SULFATE HEPTAHYDRATE 2 G: 40 INJECTION, SOLUTION INTRAVENOUS at 04:05

## 2024-06-12 RX ADMIN — PIPERACILLIN SODIUM AND TAZOBACTAM SODIUM 3.38 G: 3; .375 INJECTION, SOLUTION INTRAVENOUS at 04:48

## 2024-06-12 RX ADMIN — POTASSIUM CHLORIDE 20 MEQ: 1500 TABLET, EXTENDED RELEASE ORAL at 07:00

## 2024-06-12 RX ADMIN — SODIUM CHLORIDE, PRESERVATIVE FREE 10 ML: 5 INJECTION INTRAVENOUS at 18:40

## 2024-06-12 RX ADMIN — ENOXAPARIN SODIUM 40 MG: 40 INJECTION SUBCUTANEOUS at 06:37

## 2024-06-12 ASSESSMENT — COGNITIVE AND FUNCTIONAL STATUS - GENERAL
DAILY ACTIVITIY SCORE: 24
MOBILITY SCORE: 24

## 2024-06-12 ASSESSMENT — PAIN SCALES - GENERAL
PAINLEVEL_OUTOF10: 0 - NO PAIN

## 2024-06-12 ASSESSMENT — PAIN - FUNCTIONAL ASSESSMENT
PAIN_FUNCTIONAL_ASSESSMENT: 0-10

## 2024-06-12 ASSESSMENT — ACTIVITIES OF DAILY LIVING (ADL): LACK_OF_TRANSPORTATION: NO

## 2024-06-12 NOTE — PROGRESS NOTES
Vancomycin Dosing by Pharmacy- Cessation of Therapy    Consult to pharmacy for vancomycin dosing has been discontinued by the prescriber, pharmacy will sign off at this time.    Please call pharmacy if there are further questions or re-enter a consult if vancomycin is resumed.     Dora Hogan, [facility]

## 2024-06-12 NOTE — PROGRESS NOTES
06/12/24 1633   Discharge Planning   Living Arrangements Alone   Support Systems Children;Family members   Assistance Needed none, pt states Independent ADLS and IADLS no AD, drives, works PT, 1 fall PTA   Type of Residence Private residence  (2 level home + basement (laundry), pt bedroom/bathroom upstairs)   Number of Stairs Within Residence 12   Do you have animals or pets at home? Yes   Type of Animals or Pets 1 dog and 1 bird   Home or Post Acute Services None   Patient expects to be discharged to: Home   Does the patient need discharge transport arranged? No   Financial Resource Strain   How hard is it for you to pay for the very basics like food, housing, medical care, and heating? Not hard   Housing Stability   In the last 12 months, was there a time when you were not able to pay the mortgage or rent on time? N   In the last 12 months, how many places have you lived? 1   In the last 12 months, was there a time when you did not have a steady place to sleep or slept in a shelter (including now)? N   Transportation Needs   In the past 12 months, has lack of transportation kept you from medical appointments or from getting medications? no   In the past 12 months, has lack of transportation kept you from meetings, work, or from getting things needed for daily living? No     Per rounding report with Interdisciplinary team, pt was admitted after syncopal episode/unresponsiveness, after out with some friends, had a few glasses of wine, and she drove home and when she got out of her car and was walking in her grass she collapsed. Pt was intubated on sedation but is now extubated, awake and following commands. ETOH level was 167, negative tox screen, pt only admits to occasional drinking.  Pt Echo shows EF 40-45%, pt having left heart cath today. Met with pt and her niece who is in room visiting, pt gives permission to speak with family present. Pt from home alone, ind, works, drives, denies other falls. PT/OT evals  are pending. Pt states discharge preference is home upon DC, has family nearby that can assist if needed. Pt in agreement to CT team monitoring case for progression and potential DC needs.

## 2024-06-12 NOTE — POST-PROCEDURE NOTE
Physician Transition of Care Summary  Invasive Cardiovascular Lab    Procedure Date: 6/12/2024  Attending:    * Alberto Lassiter - Primary  Resident/Fellow/Other Assistant: Surgeons and Role:  * No surgeons found with a matching role *    Indications:   Pre-op Diagnosis     * Unresponsive [R41.89]     * Syncope and collapse [R55]     * PVC (premature ventricular contraction) [I49.3]     * Acute diastolic congestive heart failure (Multi) [I50.31]    Post-procedure diagnosis:   Post-op Diagnosis     * Unresponsive [R41.89]     * Syncope and collapse [R55]     * PVC (premature ventricular contraction) [I49.3]     * Acute diastolic congestive heart failure (Multi) [I50.31]    Procedure(s):   Left Heart Cath  73257 - [place] CATH PLMT L HRT & ARTS W/NJX & ANGIO IMG S&I    Procedure Findings:   Normal coronaries  Mildly reduced LV systolic function    Description of the Procedure:   Procedure: Left Heart Catheterization    R radial artery access with 6F sheath. LV and Ao hemodynamic measurements. S/P Left Heart Catheterization that showed non-obstructive coronary artery disease. Mildly reduced LV systolic function. Patient remained stable during the entire procedure. No complications. Hemostasis with TR Band.    Plan: Bed rest for 3 hours. Constant check for bleeding. Maintain compression band (CB) for 45 minutes past procedure. Remove 3mL of air from CB every 15 minutes until fully deflated. If recurrent bleeding occurs, re-inflate with enough air to fully restore hemostasis (2 to 3 mL, maximum of 18 mL). Maintain CB at this same level for 30 minutes before attempting to re-deflate. Once fully deflated, carefully remove CB and place a sterile dressing over access site. Observe for 15 minutes and check for pulse and for signs of bleeding. Instruct patient not to use or bend their wrist for four hours.     Would suggest to keep GDMT at this point.    Complications:   No    Stents/Implants:   Implants       No implant  documentation for this case.            Anticoagulation/Antiplatelet Plan:   ASA    Estimated Blood Loss:   0 mL    Anesthesia: Moderate Sedation Anesthesia Staff: No anesthesia staff entered.    Any Specimen(s) Removed:   No specimens collected during this procedure.    Disposition:   ICU    Electronically signed by: Alberto Lassiter MD, 6/12/2024 1:46 PM

## 2024-06-12 NOTE — PROGRESS NOTES
Vancomycin Dosing by Pharmacy- FOLLOW UP    Myrna Calhoun is a 69 y.o. year old female who Pharmacy has been consulted for vancomycin dosing for other sepsis . Based on the patient's indication and renal status this patient is being dosed based on a goal AUC of 500-600.     Renal function is currently stable.    Current vancomycin dose: 1000 mg given every 12 hours    Estimated vancomycin AUC on current dose: 527 mg/L.hr     Visit Vitals  /75 (BP Location: Right arm, Patient Position: Lying)   Pulse 74   Temp 36.4 °C (97.5 °F) (Temporal)   Resp 25        Lab Results   Component Value Date    CREATININE 1.07 (H) 2024    CREATININE 0.95 2024    CREATININE 0.72 06/10/2024    CREATININE 1.27 (H) 2024        Patient weight is as follows:   Vitals:    24 0600   Weight: 113 kg (250 lb)       Cultures:  No results found for the encounter in last 14 days.       I/O last 3 completed shifts:  In: 4404.5 (38.8 mL/kg) [P.O.:240; I.V.:2279.5 (20.1 mL/kg); IV Piggyback:1885]  Out: 2430 (21.4 mL/kg) [Urine:2080 (0.5 mL/kg/hr); Emesis/NG output:350]  Weight: 113.4 kg   I/O during current shift:  I/O this shift:  In: -   Out: 120 [Urine:120]    Temp (24hrs), Av.9 °C (98.4 °F), Min:36.1 °C (97 °F), Max:38.3 °C (101 °F)      Assessment/Plan    Within goal AUC range. Continue current vancomycin regimen.    This dosing regimen is predicted by [employer] to result in the following pharmacokinetic parameters:  Regimen: 1000 mg IV every 12 hours.  Start time: 15:04 on 2024  Exposure target: AUC24 (range)400-600 mg/L.hr   AUC24,ss: 527 mg/L.hr  Probability of AUC24 > 400: 90 %  Ctrough,ss: 17.5 mg/L  Probability of Ctrough,ss > 20: 33 %  Probability of nephrotoxicity (Lodise JOSH ): 13 %    The next level will be obtained on  at 1000. May be obtained sooner if clinically indicated.   Will continue to monitor renal function daily while on vancomycin and order serum creatinine at least every 48  hours if not already ordered.  Follow for continued vancomycin needs, clinical response, and signs/symptoms of toxicity.       Teresa Marlow, PharmD

## 2024-06-12 NOTE — NURSING NOTE
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 who presented to the urgent care earlier in the day with a chief complaint of abdominal pain and work-up revealed that he has gallstones with likely acute cholecystitis and sent for further work-up         Abdominal pain likely secondary due to cholelithiasis and likely acute cholecystitis  -He has been having abdominal pain for the past 6 days in the right upper quadrant  -His lipase level was normal at 23 and his ALT AST and total bilirubin and alkaline phosphatase were normal  -Ultrasound was done at urgent care of the abdomen which showed cholelithiasis and acute focal findings concerning for acute cholecystitis and nonvisualization of the extrahepatic common duct and moderate right renal cortical atrophy and mild dilatation of the right renal pelvis  -The ED physician did speak with general surgery team and HIDA scan has been ordered and consult has been placed to general surgery  -We will start him on clear liquid diet, IV Dilaudid, nausea and vomiting medications, IV Zosyn and n.p.o. after midnight    Right renal cortical atrophy-chronic  Incidental finding of right renal pelvis dilatation  -Ultrasound of the abdomen did mention that Moderate right renal cortical atrophy and mild dilatation of the right renal pelvis. CT imaging follow-up recommended  -He does have only one functioning kidney and follows with Dr. Poole from [facility] consultant as outpatient and has requested to see them and consult has been placed    Hypertension  -I will continue with PTA losartan and hold his hydrochlorothiazide for now    Gout  -We will continue with PTA allopurinol    Hyperlipidemia  -We we  will hold his PTA pravastatin for now    Cold-induced asthma  -We will continue with PTA Breo         Diet:    DVT Prophylaxis: Pneumatic Compression Devices  Crane Catheter: Not present  Lines: None     Cardiac Monitoring: None  Code Status:  Full code    Clinically Significant Risk Factors Present on Admission                  # Hypertension: home medication list includes antihypertensive(s)      # Obesity: Estimated body mass index is 35.67 kg/m  as calculated from the following:    Height as of this encounter: 1.829 m (6').    Weight as of this encounter: 119.3 kg (263 lb).           Disposition Plan         Gina Savage MD  Hospitalist Service  [facility]  Securely message with [employer] (more info)  Text page via [employer] Paging/Directory     I am on admitting shift and his care in the morning will be taken over by hospital medicine team    Plan of care discussed with patient and his wife and they had questions which were answered to satisfaction  ______________________________________________________________________    Chief Complaint     Abdominal pain    History is obtained from the patient    History of Present Illness   Nash Hauser is a 57 year old male who has medical history which includes hypertension, gout, hyperlipidemia, cold-induced asthma, 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  He denies any constipation or diarrhea.  Patient had ultrasound done at urgent care which was concerning for cholelithiasis and equivocal cholecystitis and he was sent to the ED. patient mentioned that he has right upper quadrant pain and it does flare after eating and he does have nonbilious vomiting without any hematemesis, hematochezia       He had lab work done today which  showed sodium of 139, potassium of 4.4, chloride of 101, bicarb of 27, BUN of 14.5, creatinine of 1.38, GFR of 60, calcium 9.7 with anion gap of 11 and albumin is 4.3, total protein of 7.3, alkaline phosphatase, ALT, AST and total bilirubin were normal and his blood glucose was 110 and lipase was normal at 23.    WBC count was elevated at 13.6, hemoglobin of 14.6 with platelet count of 276    Ultrasound of the gallbladder was done which showed cholelithiasis and a few focal findings for acute cholecystitis, moderate right renal cortical atrophy and mild dilatation of the right renal pelvis and nonvisualization of the extrahepatic common bile duct and the gallbladder wall is diffusely thickened and multiple gallstones are present and there was presence of no pericholecystic fluid and Evans sign  negative by the sonologist    The ED physician did speak with general surgery team with Dr. South and he will be started on antibiotics and n.p.o. after midnight and HIDA scan has been ordered      Past Medical History    Past Medical History:   Diagnosis Date     Hypertension      MARGARITO (obstructive sleep apnea) 7/25/2022     Other and unspecified hyperlipidemia      Other genital herpes     Uses oral Valtrex and topical acyclovir when gets flareup     Renal disease     Only one functioning kidney     Unilateral small kidney     Atrophic rt kidney       Past Surgical History   Past Surgical History:   Procedure Laterality Date     BACK SURGERY  2002    Lumbar microdiskectomy     COLONOSCOPY N/A 11/8/2017    repeat 10 years.Procedure: COLONOSCOPY;  COLONOSCOPY;  Surgeon: Oral Goodman MD;  Location:  GI     ELBOW SURGERY  2002    R Elbow surgery; incl resection of portion of radial head     EXCISE EXOSTOSIS TOE(S)  9/23/2013    Procedure: EXCISE EXOSTOSIS TOE(S);  Left Third Toe Bone Spur removal ;  Surgeon: Stephani Ramirez DPM, Podiatr;  Location:  OR     SURGICAL HISTORY OF -       LASIK right eye; did not work as  has some kind of basement membrane disorder.     [facility] NONSPECIFIC PROCEDURE  1992    tonsillectomy     [facility] NONSPECIFIC PROCEDURE  2004    Another diskectomy       Prior to Admission Medications   Prior to Admission Medications   Prescriptions Last Dose Informant Patient Reported? Taking?   BREO ELLIPTA 200-25 MCG/INH Inhaler   Yes No   Cetirizine HCl (ZYRTEC ALLERGY PO)   Yes No   Sig: Take  by mouth.   VENTOLIN  (90 Base) MCG/ACT inhaler   No No   Sig: INHALE 2 PUFFS INTO THE LUNGS EVERY 4 HOURS AS NEEDED FOR SHORTNESS OF BREATH / DYSPNEA OR WHEEZING   allopurinol (ZYLOPRIM) 300 MG tablet   Yes No   Sig: Take 300 mg by mouth daily   hydrochlorothiazide (HYDRODIURIL) 25 MG tablet   No No   Sig: TAKE 1 TABLET BY MOUTH EVERY DAY   losartan (COZAAR) 100 MG tablet   No No   Sig: TAKE 1 TABLET BY MOUTH EVERY DAY   pravastatin (PRAVACHOL) 20 MG tablet   No No   Sig: Take 1 tablet (20 mg) by mouth daily      Facility-Administered Medications: None           Physical Exam   Vital Signs: Temp: 98  F (36.7  C) Temp src: Oral BP: (!) 159/88     Resp: 16 SpO2: 98 % O2 Device: None (Room air)    Weight: 263 lbs 0 oz        General: Patient appears comfortable and in no acute distress.  HEENT: Head is atraumatic, normocephalic.  Pupils are equal, round and reactive to light.  No scleral icterus. Oral mucosa is moist   Neck: Neck is supple and No Lymphadenopathy   Respiratory: Lungs are clear to auscultation bilaterally with no wheeze or crackles   Cardiovascular: Regular rate , S1 and S2 normal with no murmer or rubs or gallops  Abdomen:   soft , RUQ tender , non distended and bowel sound present   Skin: No skin rashes or lesions to inspection or palpation.  Neurologic: Higher functions are within normal limits. No obvious defects in speech, language and memory. No facial droop  Musculoskeletal: Normal Range of motion over upper and lower extremities bilaterally   Psychiatric: cooperative     Medical Decision Making              Data     I have personally reviewed the following data over the past 24 hrs:    13.6 (H)  \   14.6   / 276     139 101 14.5 /  110 (H)   4.4 27 1.38 (H) \       ALT: 27 AST: 27 AP: 105 TBILI: 0.4   ALB: 4.3 TOT PROTEIN: 7.3 LIPASE: 23       Imaging results reviewed over the past 24 hrs:   Recent Results (from the past 24 hour(s))   US Abdomen Limited    Narrative    EXAM: US ABDOMEN LIMITED  LOCATION: [facility]  DATE/TIME: 4/6/2023 4:30 PM    INDICATION: Intermittent right upper quadrant pain for 6 days; emesis.  COMPARISON: None available.  TECHNIQUE: Limited abdominal ultrasound.    FINDINGS:    GALLBLADDER: Gallbladder is upper limits of normal size and diffusely thick-walled, measuring 6 mm. Multiple gallstones are present. Technologist reports a negative Evans's sign. No pericholecystic fluid.    BILE DUCTS: No visualized intrahepatic biliary ductal dilatation. Extrahepatic common duct is not visualized, likely due to overlying bowel gas and shadowing from multiple gallstones.     LIVER: Diffuse hepatic steatosis.     RIGHT KIDNEY: Moderate right renal cortical atrophy, with mild dilatation of the renal pelvis.    PANCREAS: Not visualized due to overlying bowel gas.      Impression    IMPRESSION:  1.  Cholelithiasis and equivocal findings for acute cholecystitis. Correlation with HIDA scan is recommended.  2.  Moderate right renal cortical atrophy and mild dilatation of the right renal pelvis. CT imaging follow-up recommended.  3.  Nonvisualization of the extrahepatic common duct.    Findings discussed verbally via telephone with Dr. Thompson at 5:10 PM on 04/06/2023.

## 2024-06-12 NOTE — PROGRESS NOTES
[facility]       Date:  6/12/2024  Patient:  Mryna Calhoun  YOB: 1955  MRN:  [id number]   Admit Date:  6/10/2024  ========================================================================================================    Chief Complaint   Patient presents with    Altered Mental Status     Unresponsive +ETOH. Unknown drug use. Bagged by EMS en route and upon arrival to ER pt still unresponsive. 2mg narcan IN given by EMS. 4mg narcan IM given by EMS.        History of Present Illness:  Myrna Calhoun is a 69 y.o. 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  Currently EMS report is not available.  Information in this H&P obtained from ED provider and family as patient is currently intubated and sedated.  According to patient's sister and niece, they were just in [place] with her 1 day PTA in [place] and patient seemed in her normal state of health.  They said patient never complained of any ailments or recent acute illness.  The story that transpired prior to patient's incident is not entirely clear but it looks like patient may have been out with some friends and she drove home and when she got out of her car and was walking in her grass she collapsed.  It is unclear if patient had a syncopal episode and then collapsed.  Patient was being bagged by EMS on route and on arrival, also given total 6mg Narcan with no response.  ED provider said on arrival GCS was 3 so patient was intubated.  She did have some nonpurposeful movements so she was placed on propofol.  Patient does have 2 sons, no other children, she is not .  As far as we are aware there is no advanced directives or healthcare power of .     Workup is essentially unremarkable aside from hypokalemia and none AG metabolic acidosis.   EtOH level +167, urine drug screen negative, initial head CT and cervical spine CT shows nothing acute, initial chest x-ray shows no acute cardiopulmonary process, no evidence of infection and UA.        Interval ICU Events:  6/10: Admitted to ICU on mechanical ventilator, patient is hemodynamically stable not requiring any vasopressor support.  Patient is throwing a lot of PVCs including bigeminy and trigeminy, currently potassium and magnesium is infusing.  Patient is only on 25 mics of propofol.  Workup ongoing to figure out why this patient went from being in his normal state of health to unresponsive.    6/11: Continued on ventilator management, sedated with propofol and precedex. Pending CT chest , abdomen and pelvis     6/12: Extubated yesterday, at neurologic baseline. TTE revealed new HFrEF, plan for LHC today to rule out ischemic cause of syncopal episode. HDS, initiated on GDMT by cardiology.       Medical History:  Past Medical History:   Diagnosis Date    Adjustment disorder with depressed mood 10/23/2023    Diabetes (Multi)     Hyperlipidemia     Hypertension     Invasive ductal carcinoma of breast, female (Multi) 2016    Bilateral     Past Surgical History:   Procedure Laterality Date    BREAST LUMPECTOMY Bilateral 2016    COLONOSCOPY  10/2023    SENTINEL LYMPH NODE BIOPSY Bilateral 03/2016    TOTAL KNEE ARTHROPLASTY Bilateral     TUBAL LIGATION       Medications Prior to Admission   Medication Sig Dispense Refill Last Dose    DULoxetine (Cymbalta) 60 mg DR capsule Take 1 capsule (60 mg) by mouth once daily.   Unknown    ezetimibe (Zetia) 10 mg tablet Take 1 tablet (10 mg) by mouth once daily.   Unknown    fluticasone (Flonase) 50 mcg/actuation nasal spray Administer 1 spray into each nostril once daily as needed for allergies.   Unknown    metFORMIN (Glucophage) 500 mg tablet Take 1 tablet (500 mg) by mouth 2 times a day. With morning and evening meals   Unknown    metoprolol tartrate (Lopressor) 50  mg tablet Take 1 tablet by mouth 2 times a day. With meals   Unknown    traZODone (Desyrel) 50 mg tablet Take 1 tablet (50 mg) by mouth as needed at bedtime.   Unknown    Vitamin D3 50 mcg (2,000 unit) capsule Take 1 capsule (50 mcg) by mouth early in the morning.. With a meal   Unknown     Simvastatin  Social History     Tobacco Use    Smoking status: Never    Smokeless tobacco: Never   Vaping Use    Vaping status: Never Used   Substance Use Topics    Alcohol use: Yes     Comment: rare, social    Drug use: Never     Family History   Problem Relation Name Age of Onset    No Known Problems Mother      Hypertension Sister         Review of Systems:  14 point review of systems was completed and negative except for those specially mention in my HPI    Physical Exam:    Heart Rate:  [68-88]   Temp:  [36.1 °C (97 °F)-38.3 °C (101 °F)]   Resp:  [7-33]   BP: ()/(52-77)   Weight:  [113 kg (250 lb)]   SpO2:  [94 %-100 %]     Physical Exam  Vitals and nursing note reviewed.   Constitutional:       Appearance: She is obese.   HENT:      Mouth/Throat:      Mouth: Mucous membranes are dry.   Eyes:      Extraocular Movements: Extraocular movements intact.      Pupils: Pupils are equal, round, and reactive to light.   Cardiovascular:      Rate and Rhythm: Normal rate and regular rhythm.      Pulses: Normal pulses.      Heart sounds: Heart sounds not distant.   Pulmonary:      Effort: Pulmonary effort is normal.      Breath sounds: Normal breath sounds.   Abdominal:      General: Abdomen is protuberant. Bowel sounds are normal.      Palpations: Abdomen is soft.   Genitourinary:     Comments: [place]- clear, yellow  Musculoskeletal:         General: Normal range of motion.      Cervical back: Full passive range of motion without pain.      Right lower leg: Edema present.      Left lower leg: Edema present.   Skin:     General: Skin is warm.      Capillary Refill: Capillary refill takes less than 2 seconds.   Neurological:       General: No focal deficit present.      Mental Status: She is alert and oriented to person, place, and time. Mental status is at baseline.      GCS: GCS eye subscore is 4. GCS verbal subscore is 5. GCS motor subscore is 6.         Objective:      Assessment/Plan:     Neuro/Psych/Pain Ctrl/Sedation:  Unresponsiveness requiring mechanical ventilation  Syncope & Collapse  Acute etoh intoxication  -At neurologic baseline  -CTH negative  -Per family not an alcoholic, rarely drinks, EtOH level 167 upon arrival  -No need to start withdrawal protocol  -Neuro checks, CAM ICU    Respiratory/ENT:  Acute respiratory failure with hypoxia resolved  -Intubated (6/11)  -No O2 needs  -O2 supplementation as needed for SpO2 >92%    Cardiovascular:  HTN  HLD  HFrEF   -TTE 6/11: EF 45%, impaired relaxation of LV diastolic filling  -Continuous cardiac monitoring in ICU  -Cardiology: GDMT  -ASA, Lipitor, Losartan, Torpol-XL, Spironolactone, Jardiance     GI:  -NPO until after LHC  - Resume cardiac/carb-control diet  -PRN bowel regimen     Renal/Volume Status (Intra & Extravascular)/Acid-Base:  BURT  Non AG metabolic acidosis resolved  Hypokalemia- resolved   - sCr slightly elevated, ?ATN vs hypotensive, repeat BMP this afternoon  - Monitor UOP  - Remove cohen today  - Daily BMP, Mg, Phos    Endocrine  Hyperglycemia in type II DM  -TSH WNL   -Hold metformin  -Accu-Cheks AC/HS  -SSI if needed     Infectious Disease:  Febrile overnight 6/11  PAN culture pending  Vancomycin (6/11-6/12), DC MRSA negative  Zosyn (6/11-*), de-escalate as cultures result  Flu/Covid pending    Heme/Onc:  Hx IDC   -s/p bilateral lumpectomy and XRT in 2016, on Anastrozole  -Continue anastrozole        OBGYN/MSK:  -PT OT     Ethics/Code Status:  Full code     :  DVT Prophylaxis: Lovenox  GI Prophylaxis: Protonix  Bowel Regimen: MiraLAX as needed  Diet: NPO  CVC: N/A  [place]: N/A  Cohen: Yes, remove today  Restraints: None  Dispo: ICU, possible transfer  after LHC if stable     Critical Care Time:  45 minutes spent in preparing to see patient (I.e.labs,imaging, etc.), documentation, discussion plan of care with patient/family/caregiver, and/ or coordination of care with multidisciplinary team including the attending. Time does not include completion of procedure time.               Teodora Sullivan, BRE-CNP

## 2024-06-12 NOTE — PROGRESS NOTES
Rounding KORTNEY/Cardiologist:  Masood Corral, BRE-CNP,  Primary Cardiologist: None  Date:  6/12/2024  Patient:  Myrna Calhoun  YOB: 1955  MRN:  [id number]   Admit Date:  6/10/2024      SUBJECTIVE:    Myrna Calhoun was seen and examined today at bedside.     She denies any chest pain or shortness of breath.     Remains in normal sinus rhythm with occasional PVC        VITALS:     Vitals:    06/12/24 0700 06/12/24 0800 06/12/24 0900 06/12/24 1000   BP: 119/77 124/69 125/69 133/75   BP Location: Right arm Right arm  Right arm   Patient Position: Lying Lying  Lying   Pulse: 76 68 70 74   Resp: 22 24 (!) 27 25   Temp:    36.4 °C (97.5 °F)   TempSrc: Temporal   Temporal   SpO2: 99% 95% 94% 95%   Weight:       Height:           Intake/Output Summary (Last 24 hours) at 6/12/2024 1047  Last data filed at 6/12/2024 1000  Gross per 24 hour   Intake 3598 ml   Output 1235 ml   Net 2363 ml       Wt Readings from Last 4 Encounters:   06/12/24 113 kg (250 lb)   02/05/24 107 kg (235 lb)   10/30/23 110 kg (242 lb)   10/24/23 110 kg (242 lb 12.8 oz)       CURRENT HOSPITAL MEDICATIONS:   aspirin, 81 mg, oral, Daily  atorvastatin, 40 mg, oral, Nightly  enoxaparin, 40 mg, subcutaneous, q24h  insulin lispro, 0-10 Units, subcutaneous, TID  metoprolol tartrate, 25 mg, oral, BID  oxygen, , inhalation, Continuous - Inhalation  pantoprazole, 40 mg, intravenous, Daily  piperacillin-tazobactam, 3.375 g, intravenous, q8h   And  sodium chloride, 10 mL, intravenous, q8h  vancomycin, 1,000 mg, intravenous, q12h      dextrose 5 % and lactated Ringer's, 50 mL/hr, Last Rate: 50 mL/hr (06/12/24 0700)      Current Outpatient Medications   Medication Instructions    DULoxetine (CYMBALTA) 60 mg, oral, Daily    ezetimibe (ZETIA) 10 mg, oral, Daily    fluticasone (Flonase) 50 mcg/actuation nasal spray Administer 1 spray into each nostril once daily as needed for allergies.    metFORMIN (GLUCOPHAGE) 500 mg, oral, 2  times daily, With morning and evening meals    metoprolol tartrate (LOPRESSOR) 50 mg, oral, 2 times daily, With meals    traZODone (Desyrel) 50 mg tablet Take 1 tablet (50 mg) by mouth as needed at bedtime.    Vitamin D3 50 mcg (2,000 unit) capsule Take 1 capsule (50 mcg) by mouth early in the morning.. With a meal        PHYSICAL EXAMINATION:     Physical Exam  Vitals and nursing note reviewed.   Constitutional:       Appearance: She is obese.   HENT:      Head: Normocephalic.      Mouth/Throat:      Mouth: Mucous membranes are moist.   Eyes:      Pupils: Pupils are equal, round, and reactive to light.   Cardiovascular:      Rate and Rhythm: Normal rate and regular rhythm.      Pulses: Normal pulses.      Comments: Occasional PVCs  Pulmonary:      Effort: Pulmonary effort is normal.   Abdominal:      Palpations: Abdomen is soft.   Skin:     General: Skin is warm and dry.      Capillary Refill: Capillary refill takes less than 2 seconds.   Neurological:      General: No focal deficit present.      Mental Status: She is alert and oriented to person, place, and time. Mental status is at baseline.   Psychiatric:         Mood and Affect: Mood normal.         LAB DATA:     CBC:   Results from last 7 days   Lab Units 06/12/24  0357 06/11/24  0354 06/10/24  2147   WBC AUTO x10*3/uL 10.6 7.3 6.2   RBC AUTO x10*6/uL 3.49* 4.17 4.11   HEMOGLOBIN g/dL 11.2* 13.5 13.3   HEMATOCRIT % 34.6* 42.8 41.2   MCV fL 99 103* 100   MCH pg 32.1 32.4 32.4   MCHC g/dL 32.4 31.5* 32.3   RDW % 14.1 13.9 13.7   PLATELETS AUTO x10*3/uL 227 238 296     CMP:    Results from last 7 days   Lab Units 06/12/24  0357 06/11/24  0354 06/10/24  2238 06/10/24  2147   SODIUM mmol/L 138 138  --  143   POTASSIUM mmol/L 3.9 4.6 3.0* 2.5*   CHLORIDE mmol/L 107 107  --  116*   CO2 mmol/L 26 15*  --  18*   BUN mg/dL 18 19  --  16   CREATININE mg/dL 1.07* 0.95  --  0.72   GLUCOSE mg/dL 124* 102*  --  124*   PROTEIN TOTAL g/dL 6.1* 7.2  --  5.5*   CALCIUM mg/dL  8.5* 8.7  --  6.7*   BILIRUBIN TOTAL mg/dL 1.3* 1.2  --  0.4   ALK PHOS U/L 40 48  --  41   AST U/L 31 59*  --  35   ALT U/L 38 59*  --  36     BMP:    Results from last 7 days   Lab Units 06/12/24  0357 06/11/24  0354 06/10/24  2238 06/10/24  2147   SODIUM mmol/L 138 138  --  143   POTASSIUM mmol/L 3.9 4.6 3.0* 2.5*   CHLORIDE mmol/L 107 107  --  116*   CO2 mmol/L 26 15*  --  18*   BUN mg/dL 18 19  --  16   CREATININE mg/dL 1.07* 0.95  --  0.72   CALCIUM mg/dL 8.5* 8.7  --  6.7*   GLUCOSE mg/dL 124* 102*  --  124*     Magnesium:  Results from last 7 days   Lab Units 06/12/24  0357 06/11/24  0354 06/10/24  2147   MAGNESIUM mg/dL 2.61* 3.01* 1.69     Troponin:    Results from last 7 days   Lab Units 06/10/24  2238 06/10/24  2147   TROPHS ng/L 8 8     BNP:   Results from last 7 days   Lab Units 06/10/24  2147   BNP pg/mL 144*     Lipid Panel:  Results from last 7 days   Lab Units 06/11/24  0920   HDL mg/dL 45.5   CHOLESTEROL/HDL RATIO  3.9   VLDL mg/dL 62*   TRIGLYCERIDES mg/dL 309*   NON HDL CHOL. mg/dL 132        DIAGNOSTIC TESTING:   @No results found for this or any previous visit.    XR chest 1 view    Result Date: 6/11/2024  Interpreted By:  Chong Leon, STUDY: XR CHEST 1 VIEW;  6/11/2024 5:06 am   INDICATION: Signs/Symptoms:New OG.   COMPARISON: 06/10/2024.   ACCESSION NUMBER(S): [id number]   ORDERING CLINICIAN: NATHANAEL FAN   FINDINGS: CARDIOMEDIASTINAL SILHOUETTE: Endotracheal tube tip projects approximately 3 cm above the patrice level. OG tube extends to the stomach level with tip not fully visualized. Cardiomegaly is stable.   LUNGS: Multifocal irregular infiltrates and atelectasis most prominent at the left base are increased from prior. Small pleural effusions not excluded. No appreciable pneumothorax.   ABDOMEN: No remarkable upper abdominal findings.   BONES: Thoracic mild dextrocurvature is present with multilevel endplate spurring of the spine.       1.  Increased multifocal irregular  infiltrates and atelectasis most prominent at the left base could be related to multifocal infection and/or edema.       MACRO: None.   Signed by: Chong Leon 6/11/2024 2:20 PM Dictation workstation:   [id number]    CT chest abdomen pelvis w IV contrast    Result Date: 6/11/2024  Interpreted By:  Paulo Sanchez, STUDY: CT CHEST ABDOMEN PELVIS W IV CONTRAST;  6/11/2024 10:49 am   INDICATION: Signs/Symptoms:resp fail, increasing acidosis   COMPARISON:   September 17, 2015 chest CT, June 21, 2023 and February 6, 2024 CT abdomen and pelvis. Lucia 10, 2024 CT cervical spine. Lucia 10, 2024 and June 11, 2024 chest radiographs and June 11, 2024 carotid ultrasound   ACCESSION NUMBER(S): [id number]   ORDERING CLINICIAN: NATHANAEL FAN   TECHNIQUE: CT of the chest, abdomen, and pelvis was performed. Contiguous axial images were obtained at  5 mm slice thickness through the chest, and at  3 mm through the abdomen and pelvis. Coronal and sagittal reconstructions at  3 mm slice thickness were performed. 75 ml of contrast material Omnipaque 350 were administered intravenously without immediate complication.   FINDINGS: Multiple radiopaque tubes and lines overlapping the patient. The tip of the endotracheal tube is at the level of the superior portion of the aortic arch. Nasogastric tube tip within the mid stomach. Leavitt catheter within decompressed urinary bladder.   CHEST:   LUNG/PLEURA/LARGE AIRWAYS: There are bibasilar were dependent coalescent opacities with air bronchograms which are newly seen compared with Lucia 10, 2024 compatible with pneumonia potentially aspiration pneumonia. No evident edema, effusion, or pneumothorax. There are no discrete pulmonary nodules.   VESSELS: Come on the thoracic aorta is of normal course and caliber.  Main pulmonary artery and its branches are normal in caliber.  No coronary artery calcifications are seen.   HEART: Borderline heart size. There is no pericardial effusion.    MEDIASTINUM AND JUAN: No pneumomediastinum, abnormal mediastinal fluid collection or mediastinal hematoma are appreciated.  No mediastinal, hilar or biaxillary adenopathy is present.  The esophagus is normal in course and caliber.   CHEST WALL AND LOWER NECK: No acute fracture or dislocation of the included osseous structures are appreciated.  No suspicious osseous lesions are identified.  The thoracic wall soft tissues are within normal limits. Similar thyromegaly.   ABDOMEN:   LIVER: No focal perfusion abnormality of the liver is appreciated to suggest contusion or laceration. There is no subcapsular hematoma, no perihepatic fluid collection.   GALLBLADDER: The gallbladder is nondistended without evidence of radiopaque stone. Portions of the lumen have density greater than simple fluid which may be due to sludge.   BILE DUCTS: The intahepatic and extrahepatic bile ducts are not dilated.   PANCREAS: The pancreas appears unremarkable.   SPLEEN: No parenchymal perfusion deficit of the spleen is appreciated to suggest contusion or laceration. There is no subcapsular hematoma, no perisplenic fluid collection.   ADRENAL GLANDS: The bilateral adrenal glands are unremarkable in appearance.   KIDNEYS AND URETERS: No parenchymal perfusion deficit is appreciated in bilateral kidneys to suggest contusion or laceration. There is no subcapsular hematoma, no perinephric fluid collection.  No hydroureteronephrosis or nephroureterolithiasis is present. Similar small probably benign cortical cyst posterior midportion right kidney measuring 15 mm AP diameter.   PELVIS:   BLADDER: Decompressed by Leavitt catheter. Otherwise grossly unremarkable.   REPRODUCTIVE ORGANS: Similar appearance without suspicious mass.   BOWEL: Nondistended without wall thickening, or pneumatosis. Normal caliber appendix. Mild colonic diverticulosis.   VESSELS: Trace atherosclerosis.   PERITONEUM/RETROPERITONEUM/LYMPH NODES: There is no evidence of intra- or  retroperitoneal hematoma.  There is no free or loculated fluid collection, no free intraperitoneal air. No abdominopelvic lymphadenopathy is present.   BONES AND ABDOMINAL WALL: No evidence of acute fracture or dislocation of the included osseous structures.  No suspicious osseous lesions are identified. Multilevel discogenic degenerative changes involving the spine. Similar grade 1 L4 anterolisthesis. The abdominal wall soft tissues appear normal.       1.  Bilateral airspace opacities dependent portion of the lower chest compatible with pneumonia potentially aspiration not apparent on Lucia 10, 2024 chest radiograph. 2. Tubes and lines as reported. 3. Potential gallbladder sludge. 4. Similar probably benign simple cyst right kidney. 5. Trace atherosclerosis. 6. Similar thyromegaly.       MACRO: None   Signed by: Paulo Sanchez 6/11/2024 12:36 PM Dictation workstation:   [id number]    Carotid duplex bilateral    Result Date: 6/11/2024  Interpreted By:  Ru Jackson, STUDY: [facility] US CAROTID ARTERY DUPLEX BILATERAL;  6/11/2024 8:15 am   INDICATION: Signs/Symptoms:syncope & collapse.   COMPARISON: None.   ACCESSION NUMBER(S): [id number]   ORDERING CLINICIAN: NATHANAEL FAN   TECHNIQUE: Vascular ultrasound of the extracranial carotid system was performed bilaterally.  Gray scale, color Doppler and spectral Doppler waveform analysis was performed.   FINDINGS: Mild atherosclerotic disease.   RIGHT:   RIGHT SIDE PEAK SYSTOLIC VELOCITY TABLE: CCA  108 cm/s. ICA  118 cm/s. ECA  140 cm/s.     The ratio of the peak systolic velocity of the right ICA/CCA is  1.09.   RIGHT VERTEBRAL ARTERY: The right vertebral artery demonstrates proximal anterograde flow.   LEFT:   LEFT SIDE PEAK SYSTOLIC VELOCITY TABLE: CCA 97 cm/s.  cm/s.  cm/s.     The ratio of the peak systolic velocity of the left ICA/CCA is 1.26.   LEFT VERTEBRAL ARTERY: The left vertebral artery demonstrates proximal anterograde flow.       Utilizing the  peak systolic velocities, the estimated degree of stenosis within the internal carotid arteries is less than 50% bilaterally.   MACRO: None.     Signed by: Ru Jackson 6/11/2024 10:51 AM Dictation workstation:   [id number]    Transthoracic Echo (TTE) Complete    Result Date: 6/11/2024          [place] [id number]  Tel [phone] Fax [phone] TRANSTHORACIC ECHOCARDIOGRAM REPORT  Patient Name:      GLADYSPAMELA Chacko Physician:    [id number] Jeremías Bustos DO Study Date:        6/11/2024             Ordering Provider:    [id number] NATHANAEL FAN MRN/PID:           [id number]              Fellow: Accession#:        [id number]          Nurse: Date of Birth/Age: 1955 / 69 years   Sonographer:          Tram MASSEY Gender:            F                     Additional Staff: Height:            172.72 cm             Admit Date:           6/10/2024 Weight:            108.41 kg             Admission Status:     Inpatient -                                                                Routine BSA / BMI:         2.20 m2 / 36.34 kg/m2 Department Location:  [facility] Blood Pressure: 109 /63 mmHg Study Type:    TRANSTHORACIC ECHO (TTE) COMPLETE Diagnosis/ICD: Syncope and collapse-R55 Indication:    UNRESPONSIVE, SYNCOPE AND COLLAPSE CPT Codes:     Echo Complete w Full [id number] Patient History: Diabetes:          Yes Pertinent History: HTN and Hyperlipidemia. Study Detail: The following Echo studies were performed: 2D, M-Mode, Doppler and               color flow. Technically challenging study due to poor acoustic               windows, prominent lung artifact, patient lying in supine position               and body habitus. The patient is intubated. Definity used as a                contrast agent for endocardial border definition and agitated               saline used as a contrast agent for intraseptal flow evaluation.               Total contrast used for this procedure was 3 mL via IV push.               Unable to obtain suprasternal notch view. The patient was sedated.  PHYSICIAN INTERPRETATION: Left Ventricle: Left ventricular systolic function is mildly decreased, with an estimated ejection fraction of 45%. There are no regional wall motion abnormalities. The left ventricular cavity size is normal. There is mild to moderate concentric left ventricular hypertrophy. Spectral Doppler shows an impaired relaxation pattern of left ventricular diastolic filling. Left Atrium: The left atrium is normal in size. A bubble study using agitated saline was performed. Bubble study is negative. Right Ventricle: The right ventricle is normal in size. There is normal right ventricular global systolic function. Right Atrium: The right atrium is normal in size. Aortic Valve: The aortic valve appears structurally normal. The aortic valve appears tricuspid. There is no evidence of aortic valve stenosis. There is no evidence of aortic valve regurgitation. The peak instantaneous gradient of the aortic valve is 10.4 mmHg. The mean gradient of the aortic valve is 6.0 mmHg. Mitral Valve: The mitral valve is normal in structure. There is no evidence of mitral valve stenosis. There is normal mitral valve leaflet mobility. There is no evidence of mitral valve regurgitation. Tricuspid Valve: The tricuspid valve is structurally normal. There is normal tricuspid valve leaflet mobility. There is trace tricuspid regurgitation. Pulmonic Valve: The pulmonic valve is structurally normal. There is no indication of pulmonic valve regurgitation. Pericardium: There is no pericardial effusion noted. Aorta: The aortic root is normal. Pulmonary Artery: The pulmonary artery is not well visualized. The tricuspid regurgitant  velocity is 1.75 m/s, and with an estimated right atrial pressure of 3 mmHg, the estimated pulmonary artery pressure is normal with the RVSP at 15.2 mmHg. Systemic Veins: The inferior vena cava appears to be of normal size. In comparison to the previous echocardiogram(s): There are no prior studies on this patient for comparison purposes.  CONCLUSIONS:  1. Left ventricular systolic function is mildly decreased with a 45% estimated ejection fraction.  2. Spectral Doppler shows an impaired relaxation pattern of left ventricular diastolic filling.  3. There is no evidence of mitral valve stenosis.  4. No evidence of mitral valve regurgitation.  5. Trace tricuspid regurgitation is visualized.  6. Aortic valve stenosis is not present.  7. The pulmonary artery is not well visualized. RECOMMENDATIONS: Technically suboptimal and limited study, therefore accuracy of above interpretation could be substantially diminished. Clinical correlation is advised. Consider additional imaging modalities if clinically indicated. Transthoracic echo has low negative predictive value for mass, vegetation, or embolic source. Consider PALAK or MRI if clinically indicated.  QUANTITATIVE DATA SUMMARY: 2D MEASUREMENTS:                           Normal Ranges: Ao Root d:     3.10 cm    (2.0-3.7cm) LAs:           2.70 cm    (2.7-4.0cm) IVSd:          1.30 cm    (0.6-1.1cm) LVPWd:         1.30 cm    (0.6-1.1cm) LVIDd:         5.20 cm    (3.9-5.9cm) LVIDs:         4.00 cm LV Mass Index: 126.3 g/m2 LV % FS        23.1 % LA VOLUME:                               Normal Ranges: LA Vol A4C:        55.2 ml    (22+/-6mL/m2) LA Vol A2C:        61.4 ml LA Vol BP:         58.7 ml LA Vol Index A4C:  25.0ml/m2 LA Vol Index A2C:  27.8 ml/m2 LA Vol Index BP:   26.6 ml/m2 LA Area A4C:       19.6 cm2 LA Area A2C:       20.5 cm2 LA Major Axis A4C: 5.9 cm LA Major Axis A2C: 5.8 cm LA Volume Index:   20.8 ml/m2 RA VOLUME BY A/L METHOD:                                Normal Ranges: RA Vol A4C:        45.0 ml    (8.3-19.5ml) RA Vol Index A4C:  20.4 ml/m2 RA Area A4C:       16.6 cm2 RA Major Axis A4C: 5.2 cm LV SYSTOLIC FUNCTION BY 2D PLANIMETRY (MOD):                     Normal Ranges: EF-A4C View: 43.1 % (>=55%) EF-A2C View: 45.9 % EF-Biplane:  45.3 % LV DIASTOLIC FUNCTION:                               Normal Ranges: MV Peak E:        1.08 m/s    (0.7-1.2 m/s) MV Peak A:        1.11 m/s    (0.42-0.7 m/s) E/A Ratio:        0.97        (1.0-2.2) MV e'             0.14 m/s    (>8.0) MV lateral e'     0.08 m/s MV medial e'      0.11 m/s E/e' Ratio:       7.88        (<8.0) PulmV Sys Tex:    50.60 cm/s PulmV Flynn Tex:   56.80 cm/s PulmV S/D Tex:    0.90 PulmV A Revs Tex: 40.30 cm/s PulmV A Revs Dur: 137.00 msec MITRAL VALVE:                 Normal Ranges: MV DT: 224 msec (150-240msec) AORTIC VALVE:                                    Normal Ranges: AoV Vmax:                1.61 m/s  (<=1.7m/s) AoV Peak PG:             10.4 mmHg (<20mmHg) AoV Mean P.0 mmHg  (1.7-11.5mmHg) LVOT Max Tex:            1.27 m/s  (<=1.1m/s) AoV VTI:                 24.00 cm  (18-25cm) LVOT VTI:                19.50 cm LVOT Diameter:           2.00 cm   (1.8-2.4cm) AoV Area, VTI:           2.55 cm2  (2.5-5.5cm2) AoV Area,Vmax:           2.48 cm2  (2.5-4.5cm2) AoV Dimensionless Index: 0.81  RIGHT VENTRICLE: RV Basal 5.10 cm RV Mid   3.50 cm RV Major 9.5 cm TAPSE:   22.3 mm RV s'    0.13 m/s TRICUSPID VALVE/RVSP:                             Normal Ranges: Peak TR Velocity: 1.75 m/s RV Syst Pressure: 15.2 mmHg (< 30mmHg) IVC Diam:         1.40 cm PULMONIC VALVE:                         Normal Ranges: PV Accel Time: 71 msec  (>120ms) PV Max Tex:    1.1 m/s  (0.6-0.9m/s) PV Max P.6 mmHg Pulmonary Veins: PulmV A Revs Dur: 137.00 msec PulmV A Revs Tex: 40.30 cm/s PulmV Flynn Tex:   56.80 cm/s PulmV S/D Tex:    0.90 PulmV Sys Tex:    50.60 cm/s  [id number] Jeremías Bustos DO Electronically signed on  6/11/2024 at 10:51:31 AM  ** Final **     ECG 12 lead    Result Date: 6/11/2024  Sinus rhythm with frequent Premature ventricular complexes Possible Left atrial enlargement Nonspecific T wave abnormality Prolonged QT Abnormal ECG When compared with ECG of 10-WOLF-2024 21:55, (unconfirmed) No significant change was found See ED provider note for full interpretation and clinical correlation    ECG 12 lead    Result Date: 6/11/2024  Sinus rhythm with frequent Premature ventricular complexes Biatrial enlargement Nonspecific T wave abnormality Prolonged QT Abnormal ECG When compared with ECG of 06-FEB-2024 00:13, Nonspecific T wave abnormality, worse in Lateral leads    CT head wo IV contrast    Result Date: 6/10/2024  Interpreted By:  Finkelstein, Evan, STUDY: CT HEAD WO IV CONTRAST; CT CERVICAL SPINE WO IV CONTRAST;  6/10/2024 10:27 pm   INDICATION: Signs/Symptoms:ams, ?fall.   COMPARISON: None.   ACCESSION NUMBER(S): [id number]; [id number]   ORDERING CLINICIAN: ALESSIA CHAHAL   TECHNIQUE: Noncontrast CT images of the head with coronal and sagittal reconstructions. Axial noncontrast CT images of the cervical spine with coronal and sagittal reconstructed images.   FINDINGS: EXTRACRANIAL SOFT TISSUES: Unremarkable.   CALVARIUM: No depressed skull fracture. No destructive osseous lesion.   PARANASAL SINUSES/MASTOIDS: Fluid levels in the maxillary sinuses bilaterally. Mastoid air cells are aerated.   HEMORRHAGE: No acute intracranial hemorrhage.   BRAIN PARENCHYMA: Gray-white matter interfaces are preserved. No mass effect or midline shift. There are nonspecific scattered white matter hypodensities.   VENTRICLES and EXTRA-AXIAL SPACES: Normal size.   OTHER FINDINGS: None.   CERVICAL SPINE:   ALIGNMENT: Reversal of the normal cervical lordosis, which may be positional or related to spasm. VERTEBRAE: No acute loss of vertebral body height. DISC SPACE: Disc space narrowing C5/C6 SPINAL CANAL: Multilevel facet and  uncovertebral arthropathy with varying degrees of neural foraminal stenosis. Prominent posterior disc osteophyte complex at C5/C6 and likely ossification along the posterior longitudinal ligament contributes to severe central narrowing. PREVERTEBRAL SOFT TISSUES: No prevertebral soft tissue swelling. LUNG APICES: Partially visualized endotracheal and gastric tube.   OTHER FINDINGS: None.       No acute intracranial hemorrhage, mass effect or midline shift.   Nonspecific scattered white matter hypodensities favored to represent sequela of small vessel ischemia. Fluid levels in the maxillary sinuses bilaterally. Correlate for symptoms of sinusitis.   Cervical spondylosis without acute loss of vertebral body height or traumatic malalignment. Prominent posterior disc osteophyte complex at C5/C6 and likely ossification along the posterior longitudinal ligament contributes to severe central narrowing at this level.     MACRO: None.   Signed by: Evan Finkelstein 6/10/2024 11:36 PM Dictation workstation:   [id number]    CT cervical spine wo IV contrast    Result Date: 6/10/2024  Interpreted By:  Finkelstein, Evan, STUDY: CT HEAD WO IV CONTRAST; CT CERVICAL SPINE WO IV CONTRAST;  6/10/2024 10:27 pm   INDICATION: Signs/Symptoms:ams, ?fall.   COMPARISON: None.   ACCESSION NUMBER(S): [id number]; [id number]   ORDERING CLINICIAN: ALESSIA CHAHAL   TECHNIQUE: Noncontrast CT images of the head with coronal and sagittal reconstructions. Axial noncontrast CT images of the cervical spine with coronal and sagittal reconstructed images.   FINDINGS: EXTRACRANIAL SOFT TISSUES: Unremarkable.   CALVARIUM: No depressed skull fracture. No destructive osseous lesion.   PARANASAL SINUSES/MASTOIDS: Fluid levels in the maxillary sinuses bilaterally. Mastoid air cells are aerated.   HEMORRHAGE: No acute intracranial hemorrhage.   BRAIN PARENCHYMA: Gray-white matter interfaces are preserved. No mass effect or midline shift. There are  nonspecific scattered white matter hypodensities.   VENTRICLES and EXTRA-AXIAL SPACES: Normal size.   OTHER FINDINGS: None.   CERVICAL SPINE:   ALIGNMENT: Reversal of the normal cervical lordosis, which may be positional or related to spasm. VERTEBRAE: No acute loss of vertebral body height. DISC SPACE: Disc space narrowing C5/C6 SPINAL CANAL: Multilevel facet and uncovertebral arthropathy with varying degrees of neural foraminal stenosis. Prominent posterior disc osteophyte complex at C5/C6 and likely ossification along the posterior longitudinal ligament contributes to severe central narrowing. PREVERTEBRAL SOFT TISSUES: No prevertebral soft tissue swelling. LUNG APICES: Partially visualized endotracheal and gastric tube.   OTHER FINDINGS: None.       No acute intracranial hemorrhage, mass effect or midline shift.   Nonspecific scattered white matter hypodensities favored to represent sequela of small vessel ischemia. Fluid levels in the maxillary sinuses bilaterally. Correlate for symptoms of sinusitis.   Cervical spondylosis without acute loss of vertebral body height or traumatic malalignment. Prominent posterior disc osteophyte complex at C5/C6 and likely ossification along the posterior longitudinal ligament contributes to severe central narrowing at this level.     MACRO: None.   Signed by: Evan Finkelstein 6/10/2024 11:36 PM Dictation workstation:   [id number]    XR chest 1 view    Result Date: 6/10/2024  Interpreted By:  Mundo Arroyo, STUDY: XR CHEST 1 VIEW;  6/10/2024 9:51 pm   INDICATION: Signs/Symptoms:Intubation.   COMPARISON: 11/20/2020.   ACCESSION NUMBER(S): [id number]   ORDERING CLINICIAN: ALESSIA CHAHAL   FINDINGS: AP supine radiograph of the chest was provided.   Leads and pacer pad overlie the chest, partially obscuring the field-of-view.   Relation of the endotracheal tube to the patrice is difficult to assess but endotracheal tube appears likely low lying, suspected to reside roughly 1.8  cm above the patrice and retraction by roughly 2 cm suggested for optimal placement. Enteric tube approximates the esophagus and extends inferior to the diaphragm and extends beyond the inferior border of the field of view, with tip and side-port not seen.   CARDIOMEDIASTINAL SILHOUETTE: Cardiomediastinal silhouette is normal in size and configuration.   LUNGS: Lungs appear clear. No sizable pleural effusion. No pneumothorax.   ABDOMEN: No remarkable upper abdominal findings.   BONES: No acute osseous changes.       1.  No definite evidence of acute cardiopulmonary process. 2. Relation of the endotracheal tube to the patrice is difficult to assess but endotracheal tube appears likely low lying, suspected to reside roughly 1.8 cm above the patrice and retraction by roughly 2 cm suggested for optimal placement. Enteric tube approximates the esophagus and extends inferior to the diaphragm and extends beyond the inferior border of the field of view, with tip and side-port not seen.       MACRO: None   Signed by: Mundo Arroyo 6/10/2024 10:27 PM Dictation workstation:   [id number]       CT chest abdomen pelvis w IV contrast   Final Result   1.  Bilateral airspace opacities dependent portion of the lower chest   compatible with pneumonia potentially aspiration not apparent on Lucia   10, 2024 chest radiograph.   2. Tubes and lines as reported.   3. Potential gallbladder sludge.   4. Similar probably benign simple cyst right kidney.   5. Trace atherosclerosis.   6. Similar thyromegaly.                  MACRO:   None        Signed by: Paulo Sanchez 6/11/2024 12:36 PM   Dictation workstation:   [id number]      Transthoracic Echo (TTE) Complete   Final Result      Carotid duplex bilateral   Final Result   Utilizing the peak systolic velocities, the estimated degree of   stenosis within the internal carotid arteries is less than 50%   bilaterally.        MACRO:   None.             Signed by: Ru Jackson 6/11/2024 10:51 AM    Dictation workstation:   [id number]      XR chest 1 view   Final Result   1.  Increased multifocal irregular infiltrates and atelectasis most   prominent at the left base could be related to multifocal infection   and/or edema.                  MACRO:   None.        Signed by: Chong Leon 6/11/2024 2:20 PM   Dictation workstation:   [id number]      CT head wo IV contrast   Final Result   No acute intracranial hemorrhage, mass effect or midline shift.        Nonspecific scattered white matter hypodensities favored to represent   sequela of small vessel ischemia. Fluid levels in the maxillary   sinuses bilaterally. Correlate for symptoms of sinusitis.        Cervical spondylosis without acute loss of vertebral body height or   traumatic malalignment. Prominent posterior disc osteophyte complex   at C5/C6 and likely ossification along the posterior longitudinal   ligament contributes to severe central narrowing at this level.             MACRO:   None.        Signed by: Evan Finkelstein 6/10/2024 11:36 PM   Dictation workstation:   [id number]      CT cervical spine wo IV contrast   Final Result   No acute intracranial hemorrhage, mass effect or midline shift.        Nonspecific scattered white matter hypodensities favored to represent   sequela of small vessel ischemia. Fluid levels in the maxillary   sinuses bilaterally. Correlate for symptoms of sinusitis.        Cervical spondylosis without acute loss of vertebral body height or   traumatic malalignment. Prominent posterior disc osteophyte complex   at C5/C6 and likely ossification along the posterior longitudinal   ligament contributes to severe central narrowing at this level.             MACRO:   None.        Signed by: Evan Finkelstein 6/10/2024 11:36 PM   Dictation workstation:   [id number]      XR chest 1 view   Final Result   1.  No definite evidence of acute cardiopulmonary process.   2. Relation of the endotracheal tube to the patrice is  difficult to   assess but endotracheal tube appears likely low lying, suspected to   reside roughly 1.8 cm above the patrice and retraction by roughly 2 cm   suggested for optimal placement. Enteric tube approximates the   esophagus and extends inferior to the diaphragm and extends beyond   the inferior border of the field of view, with tip and side-port not   seen.                  MACRO:   None        Signed by: Mundo Arroyo 6/10/2024 10:27 PM   Dictation workstation:   [id number]      Cardiac Catheterization Procedure    (Results Pending)         RADIOLOGY:     CT chest abdomen pelvis w IV contrast   Final Result   1.  Bilateral airspace opacities dependent portion of the lower chest   compatible with pneumonia potentially aspiration not apparent on Lucia   10, 2024 chest radiograph.   2. Tubes and lines as reported.   3. Potential gallbladder sludge.   4. Similar probably benign simple cyst right kidney.   5. Trace atherosclerosis.   6. Similar thyromegaly.                  MACRO:   None        Signed by: Paulo Sanchez 6/11/2024 12:36 PM   Dictation workstation:   [id number]      Transthoracic Echo (TTE) Complete   Final Result      Carotid duplex bilateral   Final Result   Utilizing the peak systolic velocities, the estimated degree of   stenosis within the internal carotid arteries is less than 50%   bilaterally.        MACRO:   None.             Signed by: Ru Jackson 6/11/2024 10:51 AM   Dictation workstation:   [id number]      XR chest 1 view   Final Result   1.  Increased multifocal irregular infiltrates and atelectasis most   prominent at the left base could be related to multifocal infection   and/or edema.                  MACRO:   None.        Signed by: Chong Leon 6/11/2024 2:20 PM   Dictation workstation:   [id number]      CT head wo IV contrast   Final Result   No acute intracranial hemorrhage, mass effect or midline shift.        Nonspecific scattered white matter hypodensities favored to  represent   sequela of small vessel ischemia. Fluid levels in the maxillary   sinuses bilaterally. Correlate for symptoms of sinusitis.        Cervical spondylosis without acute loss of vertebral body height or   traumatic malalignment. Prominent posterior disc osteophyte complex   at C5/C6 and likely ossification along the posterior longitudinal   ligament contributes to severe central narrowing at this level.             MACRO:   None.        Signed by: Evan Finkelstein 6/10/2024 11:36 PM   Dictation workstation:   [id number]      CT cervical spine wo IV contrast   Final Result   No acute intracranial hemorrhage, mass effect or midline shift.        Nonspecific scattered white matter hypodensities favored to represent   sequela of small vessel ischemia. Fluid levels in the maxillary   sinuses bilaterally. Correlate for symptoms of sinusitis.        Cervical spondylosis without acute loss of vertebral body height or   traumatic malalignment. Prominent posterior disc osteophyte complex   at C5/C6 and likely ossification along the posterior longitudinal   ligament contributes to severe central narrowing at this level.             MACRO:   None.        Signed by: Evan Finkelstein 6/10/2024 11:36 PM   Dictation workstation:   [id number]      XR chest 1 view   Final Result   1.  No definite evidence of acute cardiopulmonary process.   2. Relation of the endotracheal tube to the patrice is difficult to   assess but endotracheal tube appears likely low lying, suspected to   reside roughly 1.8 cm above the patrice and retraction by roughly 2 cm   suggested for optimal placement. Enteric tube approximates the   esophagus and extends inferior to the diaphragm and extends beyond   the inferior border of the field of view, with tip and side-port not   seen.                  MACRO:   None        Signed by: Mundo Arroyo 6/10/2024 10:27 PM   Dictation workstation:   [id number]      Cardiac Catheterization Procedure    (Results  Pending)       PROBLEM LIST     Patient Active Problem List   Diagnosis    CMC arthritis    Adjustment disorder with depressed mood    Essential hypertension    Insomnia    Colon cancer screening    Unresponsive    Syncope and collapse    PVC (premature ventricular contraction)    Acute diastolic congestive heart failure (Multi)       ASSESSMENT:   Unresponsive  Hypertension  EtOH  Hyperlipidemia  Elevated lactate  Acute diastolic congestive heart failure       PLAN:   Admitted to ICU, successfully extubated yesterday afternoon.  Alert and oriented x 3.    May benefit from neurology workup  Remains on telemetry.  Telemetry shows normal sinus rhythm with occasional PVCs.  Continue supplemental O2, keep SpO2 greater than 92%.    Monitor electrolytes, keep potassium greater than 4 and magnesium greater than 2  Gentle hydration, monitor strict I's and O's and daily weights  Echocardiogram shows slightly decreased LV function with an EF of 45% and mild tricuspid regurgitation.  No wall motion abnormalities were identified.  Low suspicion for ACS with negative troponins however I am concerned with the amount of PVCs seen on EKG dating back to February of this year.  Plan for LHC today.  N.p.o.  If LHC is negative patient may benefit from outpatient Holter monitor.  Continue aspirin, statin and beta-blocker for now.    Will eventually need GDMT for heart failure, will initiate slowly  Further recommendations to follow           Masood Corral [facility]  Adult Gerontology Acute Care Nurse Practitioner  [facility] [place]   [facility]  [phone]          Of note, this documentation is completed using the Dragon Dictation system (voice recognition software). There may be spelling and/or grammatical errors that were not corrected prior to final submission.    Please do not hesitate to call with questions.  Electronically signed by Masood Corral, BRE-CNP, on  6/12/2024 at 10:47 AM

## 2024-06-13 ENCOUNTER — PHARMACY VISIT (OUTPATIENT)
Dept: PHARMACY | Facility: CLINIC | Age: 69
End: 2024-06-13
Payer: COMMERCIAL

## 2024-06-13 VITALS
BODY MASS INDEX: 38.26 KG/M2 | HEIGHT: 68 IN | OXYGEN SATURATION: 92 % | TEMPERATURE: 97 F | WEIGHT: 252.43 LBS | SYSTOLIC BLOOD PRESSURE: 141 MMHG | DIASTOLIC BLOOD PRESSURE: 65 MMHG | RESPIRATION RATE: 28 BRPM | HEART RATE: 55 BPM

## 2024-06-13 LAB
ALBUMIN SERPL BCP-MCNC: 3.4 G/DL (ref 3.4–5)
ALP SERPL-CCNC: 40 U/L (ref 33–136)
ALT SERPL W P-5'-P-CCNC: 40 U/L (ref 7–45)
ANION GAP SERPL CALC-SCNC: 11 MMOL/L (ref 10–20)
AST SERPL W P-5'-P-CCNC: 34 U/L (ref 9–39)
BASOPHILS # BLD AUTO: 0.03 X10*3/UL (ref 0–0.1)
BASOPHILS NFR BLD AUTO: 0.4 %
BILIRUB SERPL-MCNC: 1 MG/DL (ref 0–1.2)
BUN SERPL-MCNC: 19 MG/DL (ref 6–23)
CALCIUM SERPL-MCNC: 8.6 MG/DL (ref 8.6–10.3)
CHLORIDE SERPL-SCNC: 109 MMOL/L (ref 98–107)
CO2 SERPL-SCNC: 24 MMOL/L (ref 21–32)
CREAT SERPL-MCNC: 1.06 MG/DL (ref 0.5–1.05)
EGFRCR SERPLBLD CKD-EPI 2021: 57 ML/MIN/1.73M*2
EOSINOPHIL # BLD AUTO: 0.04 X10*3/UL (ref 0–0.7)
EOSINOPHIL NFR BLD AUTO: 0.5 %
ERYTHROCYTE [DISTWIDTH] IN BLOOD BY AUTOMATED COUNT: 14.1 % (ref 11.5–14.5)
GLUCOSE BLD MANUAL STRIP-MCNC: 119 MG/DL (ref 74–99)
GLUCOSE BLD MANUAL STRIP-MCNC: 142 MG/DL (ref 74–99)
GLUCOSE BLD MANUAL STRIP-MCNC: 94 MG/DL (ref 74–99)
GLUCOSE SERPL-MCNC: 90 MG/DL (ref 74–99)
HCT VFR BLD AUTO: 35.1 % (ref 36–46)
HGB BLD-MCNC: 11.1 G/DL (ref 12–16)
IMM GRANULOCYTES # BLD AUTO: 0.02 X10*3/UL (ref 0–0.7)
IMM GRANULOCYTES NFR BLD AUTO: 0.3 % (ref 0–0.9)
LYMPHOCYTES # BLD AUTO: 2.08 X10*3/UL (ref 1.2–4.8)
LYMPHOCYTES NFR BLD AUTO: 27.6 %
MAGNESIUM SERPL-MCNC: 2.19 MG/DL (ref 1.6–2.4)
MCH RBC QN AUTO: 32.5 PG (ref 26–34)
MCHC RBC AUTO-ENTMCNC: 31.6 G/DL (ref 32–36)
MCV RBC AUTO: 103 FL (ref 80–100)
MONOCYTES # BLD AUTO: 0.72 X10*3/UL (ref 0.1–1)
MONOCYTES NFR BLD AUTO: 9.6 %
NEUTROPHILS # BLD AUTO: 4.64 X10*3/UL (ref 1.2–7.7)
NEUTROPHILS NFR BLD AUTO: 61.6 %
NRBC BLD-RTO: 0 /100 WBCS (ref 0–0)
PLATELET # BLD AUTO: 166 X10*3/UL (ref 150–450)
POTASSIUM SERPL-SCNC: 4.3 MMOL/L (ref 3.5–5.3)
PROT SERPL-MCNC: 6.3 G/DL (ref 6.4–8.2)
RBC # BLD AUTO: 3.42 X10*6/UL (ref 4–5.2)
SODIUM SERPL-SCNC: 140 MMOL/L (ref 136–145)
WBC # BLD AUTO: 7.5 X10*3/UL (ref 4.4–11.3)

## 2024-06-13 PROCEDURE — 2500000001 HC RX 250 WO HCPCS SELF ADMINISTERED DRUGS (ALT 637 FOR MEDICARE OP)

## 2024-06-13 PROCEDURE — C9113 INJ PANTOPRAZOLE SODIUM, VIA: HCPCS

## 2024-06-13 PROCEDURE — RXMED WILLOW AMBULATORY MEDICATION CHARGE

## 2024-06-13 PROCEDURE — 99233 SBSQ HOSP IP/OBS HIGH 50: CPT | Performed by: NURSE PRACTITIONER

## 2024-06-13 PROCEDURE — 85025 COMPLETE CBC W/AUTO DIFF WBC: CPT

## 2024-06-13 PROCEDURE — 82947 ASSAY GLUCOSE BLOOD QUANT: CPT

## 2024-06-13 PROCEDURE — 99239 HOSP IP/OBS DSCHRG MGMT >30: CPT | Performed by: HOSPITALIST

## 2024-06-13 PROCEDURE — 2500000004 HC RX 250 GENERAL PHARMACY W/ HCPCS (ALT 636 FOR OP/ED)

## 2024-06-13 PROCEDURE — 36415 COLL VENOUS BLD VENIPUNCTURE: CPT

## 2024-06-13 PROCEDURE — 2500000002 HC RX 250 W HCPCS SELF ADMINISTERED DRUGS (ALT 637 FOR MEDICARE OP, ALT 636 FOR OP/ED): Mod: MUE

## 2024-06-13 PROCEDURE — 97161 PT EVAL LOW COMPLEX 20 MIN: CPT | Mod: GP | Performed by: PHYSICAL THERAPIST

## 2024-06-13 PROCEDURE — 83735 ASSAY OF MAGNESIUM: CPT

## 2024-06-13 PROCEDURE — 97165 OT EVAL LOW COMPLEX 30 MIN: CPT | Mod: GO

## 2024-06-13 PROCEDURE — 80053 COMPREHEN METABOLIC PANEL: CPT

## 2024-06-13 RX ORDER — LOSARTAN POTASSIUM 25 MG/1
25 TABLET ORAL DAILY
Qty: 30 TABLET | Refills: 3 | Status: SHIPPED | OUTPATIENT
Start: 2024-06-13 | End: 2024-10-11

## 2024-06-13 RX ORDER — SPIRONOLACTONE 25 MG/1
12.5 TABLET ORAL DAILY
Qty: 15 TABLET | Refills: 2 | Status: SHIPPED | OUTPATIENT
Start: 2024-06-13 | End: 2024-09-11

## 2024-06-13 RX ORDER — ASPIRIN 81 MG/1
81 TABLET ORAL DAILY
Qty: 30 TABLET | Refills: 2 | Status: SHIPPED | OUTPATIENT
Start: 2024-06-13 | End: 2024-09-11

## 2024-06-13 RX ORDER — SPIRONOLACTONE 25 MG/1
12.5 TABLET ORAL DAILY
Qty: 15 TABLET | Refills: 3 | Status: SHIPPED | OUTPATIENT
Start: 2024-06-13 | End: 2024-06-13

## 2024-06-13 RX ORDER — METOPROLOL SUCCINATE 50 MG/1
50 TABLET, EXTENDED RELEASE ORAL DAILY
Qty: 30 TABLET | Refills: 2 | Status: SHIPPED | OUTPATIENT
Start: 2024-06-13 | End: 2024-09-11

## 2024-06-13 RX ADMIN — SODIUM CHLORIDE, PRESERVATIVE FREE 10 ML: 5 INJECTION INTRAVENOUS at 11:51

## 2024-06-13 RX ADMIN — PIPERACILLIN SODIUM AND TAZOBACTAM SODIUM 3.38 G: 3; .375 INJECTION, SOLUTION INTRAVENOUS at 02:28

## 2024-06-13 RX ADMIN — SPIRONOLACTONE 12.5 MG: 25 TABLET ORAL at 08:49

## 2024-06-13 RX ADMIN — LOSARTAN POTASSIUM 25 MG: 25 TABLET, FILM COATED ORAL at 08:49

## 2024-06-13 RX ADMIN — SODIUM CHLORIDE, PRESERVATIVE FREE 10 ML: 5 INJECTION INTRAVENOUS at 02:38

## 2024-06-13 RX ADMIN — PANTOPRAZOLE SODIUM 40 MG: 40 INJECTION, POWDER, FOR SOLUTION INTRAVENOUS at 08:50

## 2024-06-13 RX ADMIN — PIPERACILLIN SODIUM AND TAZOBACTAM SODIUM 3.38 G: 3; .375 INJECTION, SOLUTION INTRAVENOUS at 11:50

## 2024-06-13 RX ADMIN — ENOXAPARIN SODIUM 40 MG: 40 INJECTION SUBCUTANEOUS at 05:38

## 2024-06-13 RX ADMIN — EMPAGLIFLOZIN 10 MG: 10 TABLET, FILM COATED ORAL at 08:50

## 2024-06-13 RX ADMIN — METOPROLOL SUCCINATE 50 MG: 50 TABLET, EXTENDED RELEASE ORAL at 08:49

## 2024-06-13 RX ADMIN — ASPIRIN 81 MG: 81 TABLET, COATED ORAL at 08:49

## 2024-06-13 ASSESSMENT — COGNITIVE AND FUNCTIONAL STATUS - GENERAL
DAILY ACTIVITIY SCORE: 24
MOBILITY SCORE: 24
MOBILITY SCORE: 24
DAILY ACTIVITIY SCORE: 24

## 2024-06-13 ASSESSMENT — PAIN - FUNCTIONAL ASSESSMENT
PAIN_FUNCTIONAL_ASSESSMENT: 0-10
PAIN_FUNCTIONAL_ASSESSMENT: 0-10

## 2024-06-13 ASSESSMENT — PAIN SCALES - GENERAL
PAINLEVEL_OUTOF10: 0 - NO PAIN
PAINLEVEL_OUTOF10: 0 - NO PAIN

## 2024-06-13 NOTE — DISCHARGE INSTRUCTIONS
HEART FAILURE EDUCATION:  1. Weigh yourself daily and record on your weight log.  2. If you gain more than 2 or 3 pounds overnight, call your cardiologist.  3. Follow a low sodium diet. No more than 2000 mg in one day, or more than 650 mg per meal.  4. Limit total fluids to no more than 8 cups (or 2 liters) per day - this includes all fluids (water, coffee, juice, milk, tea, etc.)  5. Monitor your blood pressure daily and record on your weight log.  6. Call to schedule your follow-up appointments when you get home if they were not already scheduled for you.  7. Keep your follow-up appointments! Bring your weight log with you so the doctors can see your weight trend and blood pressure readings.  8. Be sure to  any new prescriptions and take them as directed. If unsure of the medications, be sure to call your cardiologist.  9. Stay as active as you can tolerate.   10. If you notice subtle change of symptoms (slight increase in swelling, slight shortness of breath, a new intolerance to laying flat, a new cough), be sure to call your cardiologist.  11. If you have any questions or concerns or you have not heard back from the cardiologist, feel free to call Teodora Couch heart failure navigator at [phone].

## 2024-06-13 NOTE — PROGRESS NOTES
Rounding KORTNEY/Cardiologist:  Masood Corral, APRN-CNP,  Primary Cardiologist: None  Date:  6/13/2024  Patient:  Myrna Calhoun  YOB: 1955  MRN:  [id number]   Admit Date:  6/10/2024      SUBJECTIVE:    Myrna Calhoun was seen and examined today at bedside.     She denies any chest pain or shortness of breath.     Remains in normal sinus rhythm with occasional PVC    LHC showed normal coronary arteries with slightly decreased LV function        VITALS:     Vitals:    06/12/24 1840 06/12/24 1958 06/13/24 0024 06/13/24 0549   BP: 137/66 140/83 132/77    BP Location: Left arm Right arm     Patient Position: Lying Sitting     Pulse: 63 75 59    Resp: 19 18     Temp: 36.9 °C (98.4 °F) 36.9 °C (98.4 °F) 36.6 °C (97.9 °F)    TempSrc: Temporal Temporal     SpO2: 94% 93% 91%    Weight:    115 kg (252 lb 6.8 oz)   Height:           Intake/Output Summary (Last 24 hours) at 6/13/2024 0831  Last data filed at 6/13/2024 0338  Gross per 24 hour   Intake 1360 ml   Output 190 ml   Net 1170 ml       Wt Readings from Last 4 Encounters:   06/13/24 115 kg (252 lb 6.8 oz)   02/05/24 107 kg (235 lb)   10/30/23 110 kg (242 lb)   10/24/23 110 kg (242 lb 12.8 oz)       CURRENT HOSPITAL MEDICATIONS:   aspirin, 81 mg, oral, Daily  atorvastatin, 40 mg, oral, Nightly  empagliflozin, 10 mg, oral, Daily  enoxaparin, 40 mg, subcutaneous, q24h  insulin lispro, 0-10 Units, subcutaneous, TID  losartan, 25 mg, oral, Daily  metoprolol succinate XL, 50 mg, oral, Daily  oxygen, , inhalation, Continuous - Inhalation  pantoprazole, 40 mg, intravenous, Daily  piperacillin-tazobactam, 3.375 g, intravenous, q8h   And  sodium chloride, 10 mL, intravenous, q8h  spironolactone, 12.5 mg, oral, Daily           Current Outpatient Medications   Medication Instructions    DULoxetine (CYMBALTA) 60 mg, oral, Daily    ezetimibe (ZETIA) 10 mg, oral, Daily    fluticasone (Flonase) 50 mcg/actuation nasal spray Administer 1 spray into  each nostril once daily as needed for allergies.    metFORMIN (GLUCOPHAGE) 500 mg, oral, 2 times daily, With morning and evening meals    metoprolol tartrate (LOPRESSOR) 50 mg, oral, 2 times daily, With meals    traZODone (Desyrel) 50 mg tablet Take 1 tablet (50 mg) by mouth as needed at bedtime.    Vitamin D3 50 mcg (2,000 unit) capsule Take 1 capsule (50 mcg) by mouth early in the morning.. With a meal        PHYSICAL EXAMINATION:     Physical Exam  Vitals and nursing note reviewed.   Constitutional:       Appearance: She is obese.   HENT:      Head: Normocephalic.      Mouth/Throat:      Mouth: Mucous membranes are moist.   Eyes:      Pupils: Pupils are equal, round, and reactive to light.   Cardiovascular:      Rate and Rhythm: Normal rate and regular rhythm.      Pulses: Normal pulses.      Comments: Occasional PVCs  Pulmonary:      Effort: Pulmonary effort is normal.   Abdominal:      Palpations: Abdomen is soft.   Skin:     General: Skin is warm and dry.      Capillary Refill: Capillary refill takes less than 2 seconds.   Neurological:      General: No focal deficit present.      Mental Status: She is alert and oriented to person, place, and time. Mental status is at baseline.   Psychiatric:         Mood and Affect: Mood normal.         LAB DATA:     CBC:   Results from last 7 days   Lab Units 06/13/24  0529 06/12/24  0357 06/11/24  0354   WBC AUTO x10*3/uL 7.5 10.6 7.3   RBC AUTO x10*6/uL 3.42* 3.49* 4.17   HEMOGLOBIN g/dL 11.1* 11.2* 13.5   HEMATOCRIT % 35.1* 34.6* 42.8   MCV fL 103* 99 103*   MCH pg 32.5 32.1 32.4   MCHC g/dL 31.6* 32.4 31.5*   RDW % 14.1 14.1 13.9   PLATELETS AUTO x10*3/uL 166 227 238     CMP:    Results from last 7 days   Lab Units 06/13/24  0529 06/12/24  1620 06/12/24  0357 06/11/24  0354   SODIUM mmol/L 140 138 138 138   POTASSIUM mmol/L 4.3 4.1 3.9 4.6   CHLORIDE mmol/L 109* 108* 107 107   CO2 mmol/L 24 24 26 15*   BUN mg/dL 19 15 18 19   CREATININE mg/dL 1.06* 1.03 1.07* 0.95    GLUCOSE mg/dL 90 130* 124* 102*   PROTEIN TOTAL g/dL 6.3*  --  6.1* 7.2   CALCIUM mg/dL 8.6 8.3* 8.5* 8.7   BILIRUBIN TOTAL mg/dL 1.0  --  1.3* 1.2   ALK PHOS U/L 40  --  40 48   AST U/L 34  --  31 59*   ALT U/L 40  --  38 59*     BMP:    Results from last 7 days   Lab Units 06/13/24  0529 06/12/24  1620 06/12/24  0357   SODIUM mmol/L 140 138 138   POTASSIUM mmol/L 4.3 4.1 3.9   CHLORIDE mmol/L 109* 108* 107   CO2 mmol/L 24 24 26   BUN mg/dL 19 15 18   CREATININE mg/dL 1.06* 1.03 1.07*   CALCIUM mg/dL 8.6 8.3* 8.5*   GLUCOSE mg/dL 90 130* 124*     Magnesium:  Results from last 7 days   Lab Units 06/13/24  0529 06/12/24  1620 06/12/24  0357   MAGNESIUM mg/dL 2.19 2.54* 2.61*     Troponin:    Results from last 7 days   Lab Units 06/10/24  2238 06/10/24  2147   TROPHS ng/L 8 8     BNP:   Results from last 7 days   Lab Units 06/10/24  2147   BNP pg/mL 144*     Lipid Panel:  Results from last 7 days   Lab Units 06/11/24  0920   HDL mg/dL 45.5   CHOLESTEROL/HDL RATIO  3.9   VLDL mg/dL 62*   TRIGLYCERIDES mg/dL 309*   NON HDL CHOL. mg/dL 132        DIAGNOSTIC TESTING:   @No results found for this or any previous visit.    XR chest 1 view    Result Date: 6/11/2024  Interpreted By:  Chong Leon, STUDY: XR CHEST 1 VIEW;  6/11/2024 5:06 am   INDICATION: Signs/Symptoms:New OG.   COMPARISON: 06/10/2024.   ACCESSION NUMBER(S): [id number]   ORDERING CLINICIAN: NATHANAEL FAN   FINDINGS: CARDIOMEDIASTINAL SILHOUETTE: Endotracheal tube tip projects approximately 3 cm above the patrice level. OG tube extends to the stomach level with tip not fully visualized. Cardiomegaly is stable.   LUNGS: Multifocal irregular infiltrates and atelectasis most prominent at the left base are increased from prior. Small pleural effusions not excluded. No appreciable pneumothorax.   ABDOMEN: No remarkable upper abdominal findings.   BONES: Thoracic mild dextrocurvature is present with multilevel endplate spurring of the spine.       1.   Increased multifocal irregular infiltrates and atelectasis most prominent at the left base could be related to multifocal infection and/or edema.       MACRO: None.   Signed by: Chong Leon 6/11/2024 2:20 PM Dictation workstation:   [id number]    CT chest abdomen pelvis w IV contrast    Result Date: 6/11/2024  Interpreted By:  Paulo Sanchez, STUDY: CT CHEST ABDOMEN PELVIS W IV CONTRAST;  6/11/2024 10:49 am   INDICATION: Signs/Symptoms:resp fail, increasing acidosis   COMPARISON:   September 17, 2015 chest CT, June 21, 2023 and February 6, 2024 CT abdomen and pelvis. Lucia 10, 2024 CT cervical spine. Lucia 10, 2024 and June 11, 2024 chest radiographs and June 11, 2024 carotid ultrasound   ACCESSION NUMBER(S): [id number]   ORDERING CLINICIAN: NATHANAEL FAN   TECHNIQUE: CT of the chest, abdomen, and pelvis was performed. Contiguous axial images were obtained at  5 mm slice thickness through the chest, and at  3 mm through the abdomen and pelvis. Coronal and sagittal reconstructions at  3 mm slice thickness were performed. 75 ml of contrast material Omnipaque 350 were administered intravenously without immediate complication.   FINDINGS: Multiple radiopaque tubes and lines overlapping the patient. The tip of the endotracheal tube is at the level of the superior portion of the aortic arch. Nasogastric tube tip within the mid stomach. Leavitt catheter within decompressed urinary bladder.   CHEST:   LUNG/PLEURA/LARGE AIRWAYS: There are bibasilar were dependent coalescent opacities with air bronchograms which are newly seen compared with Lucia 10, 2024 compatible with pneumonia potentially aspiration pneumonia. No evident edema, effusion, or pneumothorax. There are no discrete pulmonary nodules.   VESSELS: Come on the thoracic aorta is of normal course and caliber.  Main pulmonary artery and its branches are normal in caliber.  No coronary artery calcifications are seen.   HEART: Borderline heart size. There is no  pericardial effusion.   MEDIASTINUM AND JUAN: No pneumomediastinum, abnormal mediastinal fluid collection or mediastinal hematoma are appreciated.  No mediastinal, hilar or biaxillary adenopathy is present.  The esophagus is normal in course and caliber.   CHEST WALL AND LOWER NECK: No acute fracture or dislocation of the included osseous structures are appreciated.  No suspicious osseous lesions are identified.  The thoracic wall soft tissues are within normal limits. Similar thyromegaly.   ABDOMEN:   LIVER: No focal perfusion abnormality of the liver is appreciated to suggest contusion or laceration. There is no subcapsular hematoma, no perihepatic fluid collection.   GALLBLADDER: The gallbladder is nondistended without evidence of radiopaque stone. Portions of the lumen have density greater than simple fluid which may be due to sludge.   BILE DUCTS: The intahepatic and extrahepatic bile ducts are not dilated.   PANCREAS: The pancreas appears unremarkable.   SPLEEN: No parenchymal perfusion deficit of the spleen is appreciated to suggest contusion or laceration. There is no subcapsular hematoma, no perisplenic fluid collection.   ADRENAL GLANDS: The bilateral adrenal glands are unremarkable in appearance.   KIDNEYS AND URETERS: No parenchymal perfusion deficit is appreciated in bilateral kidneys to suggest contusion or laceration. There is no subcapsular hematoma, no perinephric fluid collection.  No hydroureteronephrosis or nephroureterolithiasis is present. Similar small probably benign cortical cyst posterior midportion right kidney measuring 15 mm AP diameter.   PELVIS:   BLADDER: Decompressed by Leavitt catheter. Otherwise grossly unremarkable.   REPRODUCTIVE ORGANS: Similar appearance without suspicious mass.   BOWEL: Nondistended without wall thickening, or pneumatosis. Normal caliber appendix. Mild colonic diverticulosis.   VESSELS: Trace atherosclerosis.   PERITONEUM/RETROPERITONEUM/LYMPH NODES: There is no  evidence of intra- or retroperitoneal hematoma.  There is no free or loculated fluid collection, no free intraperitoneal air. No abdominopelvic lymphadenopathy is present.   BONES AND ABDOMINAL WALL: No evidence of acute fracture or dislocation of the included osseous structures.  No suspicious osseous lesions are identified. Multilevel discogenic degenerative changes involving the spine. Similar grade 1 L4 anterolisthesis. The abdominal wall soft tissues appear normal.       1.  Bilateral airspace opacities dependent portion of the lower chest compatible with pneumonia potentially aspiration not apparent on Lucia 10, 2024 chest radiograph. 2. Tubes and lines as reported. 3. Potential gallbladder sludge. 4. Similar probably benign simple cyst right kidney. 5. Trace atherosclerosis. 6. Similar thyromegaly.       MACRO: None   Signed by: Paulo Sanchez 6/11/2024 12:36 PM Dictation workstation:   [id number]    Carotid duplex bilateral    Result Date: 6/11/2024  Interpreted By:  Ru Jackson, STUDY: [facility] US CAROTID ARTERY DUPLEX BILATERAL;  6/11/2024 8:15 am   INDICATION: Signs/Symptoms:syncope & collapse.   COMPARISON: None.   ACCESSION NUMBER(S): [id number]   ORDERING CLINICIAN: NATHANAEL FAN   TECHNIQUE: Vascular ultrasound of the extracranial carotid system was performed bilaterally.  Gray scale, color Doppler and spectral Doppler waveform analysis was performed.   FINDINGS: Mild atherosclerotic disease.   RIGHT:   RIGHT SIDE PEAK SYSTOLIC VELOCITY TABLE: CCA  108 cm/s. ICA  118 cm/s. ECA  140 cm/s.     The ratio of the peak systolic velocity of the right ICA/CCA is  1.09.   RIGHT VERTEBRAL ARTERY: The right vertebral artery demonstrates proximal anterograde flow.   LEFT:   LEFT SIDE PEAK SYSTOLIC VELOCITY TABLE: CCA 97 cm/s.  cm/s.  cm/s.     The ratio of the peak systolic velocity of the left ICA/CCA is 1.26.   LEFT VERTEBRAL ARTERY: The left vertebral artery demonstrates proximal anterograde  flow.       Utilizing the peak systolic velocities, the estimated degree of stenosis within the internal carotid arteries is less than 50% bilaterally.   MACRO: None.     Signed by: Ru Jackson 6/11/2024 10:51 AM Dictation workstation:   [id number]    Transthoracic Echo (TTE) Complete    Result Date: 6/11/2024          [place] [id number]  Tel [phone] Fax [phone] TRANSTHORACIC ECHOCARDIOGRAM REPORT  Patient Name:      GLADYS Chacko Physician:    [id number] Jeremías Bustos DO Study Date:        6/11/2024             Ordering Provider:    [id number] NATHANAEL FAN MRN/PID:           [id number]              Fellow: Accession#:        [id number]          Nurse: Date of Birth/Age: 1955 / 69 years   Sonographer:          Tram MASSEY Gender:            F                     Additional Staff: Height:            172.72 cm             Admit Date:           6/10/2024 Weight:            108.41 kg             Admission Status:     Inpatient -                                                                Routine BSA / BMI:         2.20 m2 / 36.34 kg/m2 Department Location:  [facility] Blood Pressure: 109 /63 mmHg Study Type:    TRANSTHORACIC ECHO (TTE) COMPLETE Diagnosis/ICD: Syncope and collapse-R55 Indication:    UNRESPONSIVE, SYNCOPE AND COLLAPSE CPT Codes:     Echo Complete w Full Doppler-[id number] Patient History: Diabetes:          Yes Pertinent History: HTN and Hyperlipidemia. Study Detail: The following Echo studies were performed: 2D, M-Mode, Doppler and               color flow. Technically challenging study due to poor acoustic               windows, prominent lung artifact, patient lying in supine position               and body habitus. The patient is intubated.  Definity used as a               contrast agent for endocardial border definition and agitated               saline used as a contrast agent for intraseptal flow evaluation.               Total contrast used for this procedure was 3 mL via IV push.               Unable to obtain suprasternal notch view. The patient was sedated.  PHYSICIAN INTERPRETATION: Left Ventricle: Left ventricular systolic function is mildly decreased, with an estimated ejection fraction of 45%. There are no regional wall motion abnormalities. The left ventricular cavity size is normal. There is mild to moderate concentric left ventricular hypertrophy. Spectral Doppler shows an impaired relaxation pattern of left ventricular diastolic filling. Left Atrium: The left atrium is normal in size. A bubble study using agitated saline was performed. Bubble study is negative. Right Ventricle: The right ventricle is normal in size. There is normal right ventricular global systolic function. Right Atrium: The right atrium is normal in size. Aortic Valve: The aortic valve appears structurally normal. The aortic valve appears tricuspid. There is no evidence of aortic valve stenosis. There is no evidence of aortic valve regurgitation. The peak instantaneous gradient of the aortic valve is 10.4 mmHg. The mean gradient of the aortic valve is 6.0 mmHg. Mitral Valve: The mitral valve is normal in structure. There is no evidence of mitral valve stenosis. There is normal mitral valve leaflet mobility. There is no evidence of mitral valve regurgitation. Tricuspid Valve: The tricuspid valve is structurally normal. There is normal tricuspid valve leaflet mobility. There is trace tricuspid regurgitation. Pulmonic Valve: The pulmonic valve is structurally normal. There is no indication of pulmonic valve regurgitation. Pericardium: There is no pericardial effusion noted. Aorta: The aortic root is normal. Pulmonary Artery: The pulmonary artery is not well visualized.  The tricuspid regurgitant velocity is 1.75 m/s, and with an estimated right atrial pressure of 3 mmHg, the estimated pulmonary artery pressure is normal with the RVSP at 15.2 mmHg. Systemic Veins: The inferior vena cava appears to be of normal size. In comparison to the previous echocardiogram(s): There are no prior studies on this patient for comparison purposes.  CONCLUSIONS:  1. Left ventricular systolic function is mildly decreased with a 45% estimated ejection fraction.  2. Spectral Doppler shows an impaired relaxation pattern of left ventricular diastolic filling.  3. There is no evidence of mitral valve stenosis.  4. No evidence of mitral valve regurgitation.  5. Trace tricuspid regurgitation is visualized.  6. Aortic valve stenosis is not present.  7. The pulmonary artery is not well visualized. RECOMMENDATIONS: Technically suboptimal and limited study, therefore accuracy of above interpretation could be substantially diminished. Clinical correlation is advised. Consider additional imaging modalities if clinically indicated. Transthoracic echo has low negative predictive value for mass, vegetation, or embolic source. Consider PALAK or MRI if clinically indicated.  QUANTITATIVE DATA SUMMARY: 2D MEASUREMENTS:                           Normal Ranges: Ao Root d:     3.10 cm    (2.0-3.7cm) LAs:           2.70 cm    (2.7-4.0cm) IVSd:          1.30 cm    (0.6-1.1cm) LVPWd:         1.30 cm    (0.6-1.1cm) LVIDd:         5.20 cm    (3.9-5.9cm) LVIDs:         4.00 cm LV Mass Index: 126.3 g/m2 LV % FS        23.1 % LA VOLUME:                               Normal Ranges: LA Vol A4C:        55.2 ml    (22+/-6mL/m2) LA Vol A2C:        61.4 ml LA Vol BP:         58.7 ml LA Vol Index A4C:  25.0ml/m2 LA Vol Index A2C:  27.8 ml/m2 LA Vol Index BP:   26.6 ml/m2 LA Area A4C:       19.6 cm2 LA Area A2C:       20.5 cm2 LA Major Axis A4C: 5.9 cm LA Major Axis A2C: 5.8 cm LA Volume Index:   20.8 ml/m2 RA VOLUME BY A/L METHOD:                                Normal Ranges: RA Vol A4C:        45.0 ml    (8.3-19.5ml) RA Vol Index A4C:  20.4 ml/m2 RA Area A4C:       16.6 cm2 RA Major Axis A4C: 5.2 cm LV SYSTOLIC FUNCTION BY 2D PLANIMETRY (MOD):                     Normal Ranges: EF-A4C View: 43.1 % (>=55%) EF-A2C View: 45.9 % EF-Biplane:  45.3 % LV DIASTOLIC FUNCTION:                               Normal Ranges: MV Peak E:        1.08 m/s    (0.7-1.2 m/s) MV Peak A:        1.11 m/s    (0.42-0.7 m/s) E/A Ratio:        0.97        (1.0-2.2) MV e'             0.14 m/s    (>8.0) MV lateral e'     0.08 m/s MV medial e'      0.11 m/s E/e' Ratio:       7.88        (<8.0) PulmV Sys Tex:    50.60 cm/s PulmV Flynn Tex:   56.80 cm/s PulmV S/D Tex:    0.90 PulmV A Revs Tex: 40.30 cm/s PulmV A Revs Dur: 137.00 msec MITRAL VALVE:                 Normal Ranges: MV DT: 224 msec (150-240msec) AORTIC VALVE:                                    Normal Ranges: AoV Vmax:                1.61 m/s  (<=1.7m/s) AoV Peak PG:             10.4 mmHg (<20mmHg) AoV Mean P.0 mmHg  (1.7-11.5mmHg) LVOT Max Tex:            1.27 m/s  (<=1.1m/s) AoV VTI:                 24.00 cm  (18-25cm) LVOT VTI:                19.50 cm LVOT Diameter:           2.00 cm   (1.8-2.4cm) AoV Area, VTI:           2.55 cm2  (2.5-5.5cm2) AoV Area,Vmax:           2.48 cm2  (2.5-4.5cm2) AoV Dimensionless Index: 0.81  RIGHT VENTRICLE: RV Basal 5.10 cm RV Mid   3.50 cm RV Major 9.5 cm TAPSE:   22.3 mm RV s'    0.13 m/s TRICUSPID VALVE/RVSP:                             Normal Ranges: Peak TR Velocity: 1.75 m/s RV Syst Pressure: 15.2 mmHg (< 30mmHg) IVC Diam:         1.40 cm PULMONIC VALVE:                         Normal Ranges: PV Accel Time: 71 msec  (>120ms) PV Max Tex:    1.1 m/s  (0.6-0.9m/s) PV Max P.6 mmHg Pulmonary Veins: PulmV A Revs Dur: 137.00 msec PulmV A Revs Tex: 40.30 cm/s PulmV Flynn Tex:   56.80 cm/s PulmV S/D Tex:    0.90 PulmV Sys Tex:    50.60 cm/s  [id number] Jeremías Bustos DO  Electronically signed on 6/11/2024 at 10:51:31 AM  ** Final **     ECG 12 lead    Result Date: 6/11/2024  Sinus rhythm with frequent Premature ventricular complexes Possible Left atrial enlargement Nonspecific T wave abnormality Prolonged QT Abnormal ECG When compared with ECG of 10-WOLF-2024 21:55, (unconfirmed) No significant change was found See ED provider note for full interpretation and clinical correlation    ECG 12 lead    Result Date: 6/11/2024  Sinus rhythm with frequent Premature ventricular complexes Biatrial enlargement Nonspecific T wave abnormality Prolonged QT Abnormal ECG When compared with ECG of 06-FEB-2024 00:13, Nonspecific T wave abnormality, worse in Lateral leads    CT head wo IV contrast    Result Date: 6/10/2024  Interpreted By:  Finkelstein, Evan, STUDY: CT HEAD WO IV CONTRAST; CT CERVICAL SPINE WO IV CONTRAST;  6/10/2024 10:27 pm   INDICATION: Signs/Symptoms:ams, ?fall.   COMPARISON: None.   ACCESSION NUMBER(S): [id number]; [id number]   ORDERING CLINICIAN: ALESSIA CHAHAL   TECHNIQUE: Noncontrast CT images of the head with coronal and sagittal reconstructions. Axial noncontrast CT images of the cervical spine with coronal and sagittal reconstructed images.   FINDINGS: EXTRACRANIAL SOFT TISSUES: Unremarkable.   CALVARIUM: No depressed skull fracture. No destructive osseous lesion.   PARANASAL SINUSES/MASTOIDS: Fluid levels in the maxillary sinuses bilaterally. Mastoid air cells are aerated.   HEMORRHAGE: No acute intracranial hemorrhage.   BRAIN PARENCHYMA: Gray-white matter interfaces are preserved. No mass effect or midline shift. There are nonspecific scattered white matter hypodensities.   VENTRICLES and EXTRA-AXIAL SPACES: Normal size.   OTHER FINDINGS: None.   CERVICAL SPINE:   ALIGNMENT: Reversal of the normal cervical lordosis, which may be positional or related to spasm. VERTEBRAE: No acute loss of vertebral body height. DISC SPACE: Disc space narrowing C5/C6 SPINAL CANAL:  Multilevel facet and uncovertebral arthropathy with varying degrees of neural foraminal stenosis. Prominent posterior disc osteophyte complex at C5/C6 and likely ossification along the posterior longitudinal ligament contributes to severe central narrowing. PREVERTEBRAL SOFT TISSUES: No prevertebral soft tissue swelling. LUNG APICES: Partially visualized endotracheal and gastric tube.   OTHER FINDINGS: None.       No acute intracranial hemorrhage, mass effect or midline shift.   Nonspecific scattered white matter hypodensities favored to represent sequela of small vessel ischemia. Fluid levels in the maxillary sinuses bilaterally. Correlate for symptoms of sinusitis.   Cervical spondylosis without acute loss of vertebral body height or traumatic malalignment. Prominent posterior disc osteophyte complex at C5/C6 and likely ossification along the posterior longitudinal ligament contributes to severe central narrowing at this level.     MACRO: None.   Signed by: Evan Finkelstein 6/10/2024 11:36 PM Dictation workstation:   [id number]    CT cervical spine wo IV contrast    Result Date: 6/10/2024  Interpreted By:  Finkelstein, Evan, STUDY: CT HEAD WO IV CONTRAST; CT CERVICAL SPINE WO IV CONTRAST;  6/10/2024 10:27 pm   INDICATION: Signs/Symptoms:ams, ?fall.   COMPARISON: None.   ACCESSION NUMBER(S): [id number]; [id number]   ORDERING CLINICIAN: ALESSIA CHAHAL   TECHNIQUE: Noncontrast CT images of the head with coronal and sagittal reconstructions. Axial noncontrast CT images of the cervical spine with coronal and sagittal reconstructed images.   FINDINGS: EXTRACRANIAL SOFT TISSUES: Unremarkable.   CALVARIUM: No depressed skull fracture. No destructive osseous lesion.   PARANASAL SINUSES/MASTOIDS: Fluid levels in the maxillary sinuses bilaterally. Mastoid air cells are aerated.   HEMORRHAGE: No acute intracranial hemorrhage.   BRAIN PARENCHYMA: Gray-white matter interfaces are preserved. No mass effect or midline  shift. There are nonspecific scattered white matter hypodensities.   VENTRICLES and EXTRA-AXIAL SPACES: Normal size.   OTHER FINDINGS: None.   CERVICAL SPINE:   ALIGNMENT: Reversal of the normal cervical lordosis, which may be positional or related to spasm. VERTEBRAE: No acute loss of vertebral body height. DISC SPACE: Disc space narrowing C5/C6 SPINAL CANAL: Multilevel facet and uncovertebral arthropathy with varying degrees of neural foraminal stenosis. Prominent posterior disc osteophyte complex at C5/C6 and likely ossification along the posterior longitudinal ligament contributes to severe central narrowing. PREVERTEBRAL SOFT TISSUES: No prevertebral soft tissue swelling. LUNG APICES: Partially visualized endotracheal and gastric tube.   OTHER FINDINGS: None.       No acute intracranial hemorrhage, mass effect or midline shift.   Nonspecific scattered white matter hypodensities favored to represent sequela of small vessel ischemia. Fluid levels in the maxillary sinuses bilaterally. Correlate for symptoms of sinusitis.   Cervical spondylosis without acute loss of vertebral body height or traumatic malalignment. Prominent posterior disc osteophyte complex at C5/C6 and likely ossification along the posterior longitudinal ligament contributes to severe central narrowing at this level.     MACRO: None.   Signed by: Evan Finkelstein 6/10/2024 11:36 PM Dictation workstation:   [id number]    XR chest 1 view    Result Date: 6/10/2024  Interpreted By:  Mundo Arroyo, STUDY: XR CHEST 1 VIEW;  6/10/2024 9:51 pm   INDICATION: Signs/Symptoms:Intubation.   COMPARISON: 11/20/2020.   ACCESSION NUMBER(S): [id number]   ORDERING CLINICIAN: ALESSIA CHAHAL   FINDINGS: AP supine radiograph of the chest was provided.   Leads and pacer pad overlie the chest, partially obscuring the field-of-view.   Relation of the endotracheal tube to the patrice is difficult to assess but endotracheal tube appears likely low lying, suspected to  reside roughly 1.8 cm above the patrice and retraction by roughly 2 cm suggested for optimal placement. Enteric tube approximates the esophagus and extends inferior to the diaphragm and extends beyond the inferior border of the field of view, with tip and side-port not seen.   CARDIOMEDIASTINAL SILHOUETTE: Cardiomediastinal silhouette is normal in size and configuration.   LUNGS: Lungs appear clear. No sizable pleural effusion. No pneumothorax.   ABDOMEN: No remarkable upper abdominal findings.   BONES: No acute osseous changes.       1.  No definite evidence of acute cardiopulmonary process. 2. Relation of the endotracheal tube to the patrice is difficult to assess but endotracheal tube appears likely low lying, suspected to reside roughly 1.8 cm above the patrice and retraction by roughly 2 cm suggested for optimal placement. Enteric tube approximates the esophagus and extends inferior to the diaphragm and extends beyond the inferior border of the field of view, with tip and side-port not seen.       MACRO: None   Signed by: Mundo Arroyo 6/10/2024 10:27 PM Dictation workstation:   [id number]       Cardiac Catheterization Procedure   Final Result      CT chest abdomen pelvis w IV contrast   Final Result   1.  Bilateral airspace opacities dependent portion of the lower chest   compatible with pneumonia potentially aspiration not apparent on Lucia   10, 2024 chest radiograph.   2. Tubes and lines as reported.   3. Potential gallbladder sludge.   4. Similar probably benign simple cyst right kidney.   5. Trace atherosclerosis.   6. Similar thyromegaly.                  MACRO:   None        Signed by: Paulo Sanchez 6/11/2024 12:36 PM   Dictation workstation:   [id number]      Transthoracic Echo (TTE) Complete   Final Result      Carotid duplex bilateral   Final Result   Utilizing the peak systolic velocities, the estimated degree of   stenosis within the internal carotid arteries is less than 50%   bilaterally.         MACRO:   None.             Signed by: Ru Jackson 6/11/2024 10:51 AM   Dictation workstation:   [id number]      XR chest 1 view   Final Result   1.  Increased multifocal irregular infiltrates and atelectasis most   prominent at the left base could be related to multifocal infection   and/or edema.                  MACRO:   None.        Signed by: Chong Leon 6/11/2024 2:20 PM   Dictation workstation:   [id number]      CT head wo IV contrast   Final Result   No acute intracranial hemorrhage, mass effect or midline shift.        Nonspecific scattered white matter hypodensities favored to represent   sequela of small vessel ischemia. Fluid levels in the maxillary   sinuses bilaterally. Correlate for symptoms of sinusitis.        Cervical spondylosis without acute loss of vertebral body height or   traumatic malalignment. Prominent posterior disc osteophyte complex   at C5/C6 and likely ossification along the posterior longitudinal   ligament contributes to severe central narrowing at this level.             MACRO:   None.        Signed by: Evan Finkelstein 6/10/2024 11:36 PM   Dictation workstation:   [id number]      CT cervical spine wo IV contrast   Final Result   No acute intracranial hemorrhage, mass effect or midline shift.        Nonspecific scattered white matter hypodensities favored to represent   sequela of small vessel ischemia. Fluid levels in the maxillary   sinuses bilaterally. Correlate for symptoms of sinusitis.        Cervical spondylosis without acute loss of vertebral body height or   traumatic malalignment. Prominent posterior disc osteophyte complex   at C5/C6 and likely ossification along the posterior longitudinal   ligament contributes to severe central narrowing at this level.             MACRO:   None.        Signed by: Evan Finkelstein 6/10/2024 11:36 PM   Dictation workstation:   [id number]      XR chest 1 view   Final Result   1.  No definite evidence of acute cardiopulmonary  process.   2. Relation of the endotracheal tube to the patrice is difficult to   assess but endotracheal tube appears likely low lying, suspected to   reside roughly 1.8 cm above the patrice and retraction by roughly 2 cm   suggested for optimal placement. Enteric tube approximates the   esophagus and extends inferior to the diaphragm and extends beyond   the inferior border of the field of view, with tip and side-port not   seen.                  MACRO:   None        Signed by: Mundo Arroyo 6/10/2024 10:27 PM   Dictation workstation:   [id number]            RADIOLOGY:     Cardiac Catheterization Procedure   Final Result      CT chest abdomen pelvis w IV contrast   Final Result   1.  Bilateral airspace opacities dependent portion of the lower chest   compatible with pneumonia potentially aspiration not apparent on Lucia   10, 2024 chest radiograph.   2. Tubes and lines as reported.   3. Potential gallbladder sludge.   4. Similar probably benign simple cyst right kidney.   5. Trace atherosclerosis.   6. Similar thyromegaly.                  MACRO:   None        Signed by: Paulo Sanchez 6/11/2024 12:36 PM   Dictation workstation:   [id number]      Transthoracic Echo (TTE) Complete   Final Result      Carotid duplex bilateral   Final Result   Utilizing the peak systolic velocities, the estimated degree of   stenosis within the internal carotid arteries is less than 50%   bilaterally.        MACRO:   None.             Signed by: Ru Jackson 6/11/2024 10:51 AM   Dictation workstation:   [id number]      XR chest 1 view   Final Result   1.  Increased multifocal irregular infiltrates and atelectasis most   prominent at the left base could be related to multifocal infection   and/or edema.                  MACRO:   None.        Signed by: Chong Leon 6/11/2024 2:20 PM   Dictation workstation:   [id number]      CT head wo IV contrast   Final Result   No acute intracranial hemorrhage, mass effect or midline shift.         Nonspecific scattered white matter hypodensities favored to represent   sequela of small vessel ischemia. Fluid levels in the maxillary   sinuses bilaterally. Correlate for symptoms of sinusitis.        Cervical spondylosis without acute loss of vertebral body height or   traumatic malalignment. Prominent posterior disc osteophyte complex   at C5/C6 and likely ossification along the posterior longitudinal   ligament contributes to severe central narrowing at this level.             MACRO:   None.        Signed by: Evan Finkelstein 6/10/2024 11:36 PM   Dictation workstation:   [id number]      CT cervical spine wo IV contrast   Final Result   No acute intracranial hemorrhage, mass effect or midline shift.        Nonspecific scattered white matter hypodensities favored to represent   sequela of small vessel ischemia. Fluid levels in the maxillary   sinuses bilaterally. Correlate for symptoms of sinusitis.        Cervical spondylosis without acute loss of vertebral body height or   traumatic malalignment. Prominent posterior disc osteophyte complex   at C5/C6 and likely ossification along the posterior longitudinal   ligament contributes to severe central narrowing at this level.             MACRO:   None.        Signed by: Evan Finkelstein 6/10/2024 11:36 PM   Dictation workstation:   [id number]      XR chest 1 view   Final Result   1.  No definite evidence of acute cardiopulmonary process.   2. Relation of the endotracheal tube to the patrice is difficult to   assess but endotracheal tube appears likely low lying, suspected to   reside roughly 1.8 cm above the patrice and retraction by roughly 2 cm   suggested for optimal placement. Enteric tube approximates the   esophagus and extends inferior to the diaphragm and extends beyond   the inferior border of the field of view, with tip and side-port not   seen.                  MACRO:   None        Signed by: Mundo Arroyo 6/10/2024 10:27 PM   Dictation workstation:    [id number]          PROBLEM LIST     Patient Active Problem List   Diagnosis    CMC arthritis    Adjustment disorder with depressed mood    Essential hypertension    Insomnia    Colon cancer screening    Unresponsive    Syncope and collapse    PVC (premature ventricular contraction)    Acute diastolic congestive heart failure (Multi)       ASSESSMENT:   Unresponsive  Hypertension  EtOH  Hyperlipidemia  Elevated lactate  Acute diastolic congestive heart failure       PLAN:   Transferred to telemetry floor.  Alert and oriented x 3.    Remains on telemetry.  Telemetry shows normal sinus rhythm with occasional PVCs.  Continue supplemental O2, keep SpO2 greater than 92%.    Monitor electrolytes, keep potassium greater than 4 and magnesium greater than 2  Gentle hydration, monitor strict I's and O's and daily weights  Echocardiogram shows slightly decreased LV function with an EF of 45% and mild tricuspid regurgitation.  No wall motion abnormalities were identified.  LHC showed normal coronary arteries with slight decreased LV function  Continue GDMT for heart failure  Heart failure navigator consult  Patient to follow-up with Dr. Toure in 2 weeks  Repeat BMP in 2 weeks  General cardiology to sign off, please reconsult for any further needs           Masood [facility]  Adult Gerontology Acute Care Nurse Practitioner  [facility] [place]   [facility]  [phone]          Of note, this documentation is completed using the Dragon Dictation system (voice recognition software). There may be spelling and/or grammatical errors that were not corrected prior to final submission.    Please do not hesitate to call with questions.  Electronically signed by BRE Rogers-CNP, on 6/13/2024 at 8:31 AM

## 2024-06-13 NOTE — PROGRESS NOTES
Occupational Therapy    Evaluation    Patient Name: Myrna Calhoun  MRN: [id number]  Today's Date: 6/13/2024  Time Calculation  Start Time: 0952  Stop Time: 1000  Time Calculation (min): 8 min      Assessment:  End of Session Communication: Bedside nurse  End of Session Patient Position: Bed, 2 rail up, Alarm off, not on at start of session (Seated EOB - call light within reach.)     Plan:  No Skilled OT: No acute OT goals identified  OT Frequency: OT eval only  OT Discharge Recommendations: No further acute OT  OT - OK to Discharge: Yes (Once medically appropriate.)       Subjective   Current Problem:  1. Unresponsive  Transthoracic Echo (TTE) Complete    Transthoracic Echo (TTE) Complete    Carotid duplex bilateral    Carotid duplex bilateral    Case Request Cath Lab: Left Heart Cath    Case Request Cath Lab: Left Heart Cath    Cardiac Catheterization Procedure    Cardiac Catheterization Procedure      2. Hypokalemia        3. Elevated lactic acid level        4. Alcoholic intoxication with complication (CMS-HCC)        5. Syncope and collapse  Transthoracic Echo (TTE) Complete    Transthoracic Echo (TTE) Complete    Carotid duplex bilateral    Carotid duplex bilateral    Case Request Cath Lab: Left Heart Cath    Case Request Cath Lab: Left Heart Cath    Cardiac Catheterization Procedure    Cardiac Catheterization Procedure      6. PVC (premature ventricular contraction)  Case Request Cath Lab: Left Heart Cath    Case Request Cath Lab: Left Heart Cath    Cardiac Catheterization Procedure    Cardiac Catheterization Procedure      7. Acute diastolic congestive heart failure (Multi)  Case Request Cath Lab: Left Heart Cath    Case Request Cath Lab: Left Heart Cath    Cardiac Catheterization Procedure    Cardiac Catheterization Procedure    aspirin 81 mg EC tablet    empagliflozin (Jardiance) 10 mg    losartan (Cozaar) 25 mg tablet    metoprolol succinate XL (Toprol-XL) 50 mg 24 hr tablet    Basic metabolic panel     Referral to Healthy at Home Program    spironolactone (Aldactone) 25 mg tablet    DISCONTINUED: spironolactone (Aldactone) 25 mg tablet        General:  General  Reason for Referral: ADLs  Referred By: LANEY Sullivan CNP (PT/OT 6/12)  Past Medical History Relevant to Rehab: includes: B TKA, breast Ca with B lumpectomy, DM, HTN, HLD  Prior to Session Communication: Bedside nurse (Cleared for therapy evaluation by RN.)  Patient Position Received: Bed, 2 rail up, Alarm off, not on at start of session  General Comment: Pt. is a 70yo who presented [place] [facility] [place]10/2024 with altered mental status, (+) LOC, (+) ETOH. Pt was driving home, got out of car, walkewd across her yard and collapsed. In ED, GCS = 3, HR = 110, BP = 148/99. Pt. was intubated, admitted to ICU. Pt. was extubated on 6/11/2024. Pt. underwent a cardiac cath and was transferd to a [facility] on 6/12/2024.  Head CT (6/10) (-) acute findings  C-spine CT (6/10) (-) acute findings, (+) spondylosis, CXR (6/11) (+) multifocal infiltrate/atelctasis especially  base  Chest/ABD/Pelvic CT (6/11) (+) B pneumonia ETOH (6/10) 167 BNP (6/10) 144  K (6/10) 2.5, Hgb (6/13) 11.1 trending down Dx: unresponsiveness, hypokalemia, increased lactic acid, ETOH intoxication     Pain:  Pain Assessment  Pain Assessment: 0-10  Pain Score: 0 - No pain    Objective   Cognition:  Overall Cognitive Status: Within Functional Limits  Orientation Level: Oriented X4           Home Living:  Home Living Comments: Patient lives alone (dog and bird) in a 2 story house with 4 MARYCRUZ with a HR. Bedroom and bathroom on the 2nd level, HR available. Tub shower with a shower seat and grab bar. Laundry located in the basement.  Prior Function:  Prior Function Comments: Patient ambulates independently without a device, owns a FWW and cane. Independent with ADLs and IADLs. Reports 1 fall in the past 3 months. Patient drives. Works.     ADL:  Eating Assistance: Independent  Grooming Assistance: Independent  Bathing  Assistance:  (Mod I)  UE Dressing Assistance: Independent  LE Dressing Assistance:  (Mod I)  Toileting Assistance with Device: Modified independent     Bed Mobility/Transfers: Bed Mobility 1  Bed Mobility 1: Supine to sitting  Bed Mobility Comments 1: HOB elevated. Mod I level.    Transfer 1  Technique 1: Sit to stand, Stand to sit  Trials/Comments 1: Patient completed sit <> stand from EOB without a device at mod I level.    Functional Mobility:  Functional Mobility  Functional Mobility Performed: Yes  Functional Mobility 1  Comments 1: Patient completed functional mobility throughout the room without a device at mod I level.     Standing Balance:  Dynamic Standing Balance  Dynamic Standing-Comments: Fair +      Strength:  Strength Comments: B/L UE MMT WFL throughout.     Extremities: RUE   RUE : Within Functional Limits and LUE   LUE: Within Functional Limits    Outcome Measures:[facility] Daily Activity  Putting on and taking off regular lower body clothing: None  Bathing (including washing, rinsing, drying): None  Putting on and taking off regular upper body clothing: None  Toileting, which includes using toilet, bedpan or urinal: None  Taking care of personal grooming such as brushing teeth: None  Eating Meals: None  Daily Activity - Total Score: 24    EDUCATION:  Education  Individual(s) Educated: Patient  Education Provided: POC discussed and agreed upon, Risk and benefits of OT discussed with patient or other, Fall precautons  Patient Response to Education: Patient/Caregiver Verbalized Understanding of Information

## 2024-06-13 NOTE — NURSING NOTE
12 hrs post ICU transfer: Pt vitals stable, no labs to review since ICU transfer. Pt on room air with no reports of chest pain, tele records SR with moderate amount of PVC activity.

## 2024-06-13 NOTE — NURSING NOTE
CHF Clinical Nurse Navigator Documentation  Congestive Heart Failure disease education was performed by the Clinical Nurse Navigator with a good understanding: yes  CHF signs and symptoms discussed and when to call cardiologist?  yes  Living With Heart Failure Education booklet?  no  Controlling Heart Failure at Home Education? yes  Home medication usage?  yes  Nutrition Education? Yes-low sodium   Fluid Restriction Education? yes  Daily Weight Education? Yes-daily weight log provided   Follow-up with Cardiologist after discharge education? yes  Comments: Met with patient at bedside for heart failure education. Patient verbalized understanding. Patient lives at home alone. 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 She did not have a cardiologist prior to admission but does have a PCP at [facility]. She is aware that upon discharge she will have follow up scheduled with cardiology. She states she is compliant with all her medications except one. She was not sure which one but said it was a large white one that she is supposed to take twice a day and she only takes it once a day. Stressed the importance of taking her medications everyday as prescribed and also stressed the importance of keeping and going to her follow up appointments. Spoke with patient about Healthy at Home Program. Patient is agreeable. Referral placed. No questions or concerns at this time. Provided my contact information should any questions or concerns arise.

## 2024-06-13 NOTE — PROGRESS NOTES
06/13/24 1124   Current Planned Discharge Disposition   Current Planned Discharge Disposition Home     HOME with [facility] Home. Declines any further needs.

## 2024-06-13 NOTE — PROGRESS NOTES
Physical Therapy    Physical Therapy Evaluation    Patient Name: Myrna Calhoun  MRN: [id number]  Today's Date: 6/13/2024   Time Calculation  Start Time: 0951  Stop Time: 1001  Time Calculation (min): 10 min  1002/1002-A    Assessment/Plan   PT Assessment  Rehab Prognosis: Good  Evaluation/Treatment Tolerance: Patient tolerated treatment well  Medical Staff Made Aware: Yes  Strengths: Ability to acquire knowledge, Access to adaptive/assistive products, Attitude of self, Capable of completing ADLs semi/independent, Living arrangement secure, Premorbid level of function  End of Session Communication: Bedside nurse  End of Session Patient Position: Bed, 2 rail up, Alarm off, not on at start of session  IP OR SWING BED PT PLAN  Inpatient or Swing Bed: Inpatient  PT Plan  PT Plan: PT Eval only  PT Eval Only Reason: No acute PT needs identified  PT Frequency: PT eval only  PT Discharge Recommendations: No further acute PT  PT Recommended Transfer Status: Independent  Physical Therapy eval completed per MD requisition. P.T. recommendations as outlined above. Recommend D/C from acute care when medically appropriate as deemed by medical staff.    Subjective           General Visit Information:  General  Reason for Referral: impaired mobility  Referred By: LANYE Sullivan CNP (PT/OT 6/12)  Past Medical History Relevant to Rehab: includes: B TKA, breast Ca with B lumpectomy, DM, HTN, HLD  Family/Caregiver Present: No  Prior to Session Communication: Bedside nurse  Patient Position Received: Bed, 2 rail up, Alarm off, not on at start of session  Preferred Learning Style: auditory, verbal  General Comment: Pt. is a 68yo who [place] [facility] [place] with altered mental status, (+) LOC, (+) ETOH. Pt was driving home, got out of car, walkewd across her yard and collapsed. In ED, GCS = 3, HR = 110, BP = 148/99. Pt. was intubated, admitted to ICU. Pt. was extubated on 6/11/2024. Pt. underwent a cardiac cath and was transferd to a  [facility] on 6/12/2024.    Head CT (6/10) (-) acute findings    C-spine CT (6/10) (-) acute findings, (+) spondylosis,   CXR (6/11) (+) multifocal infiltrate/atelctasis especially  base    Chest/ABD/Pelvic CT (6/11) (+) B pneumonia   ETOH (6/10) 167   BNP (6/10) 144    K (6/10) 2.5,   Hgb (6/13) 11.1 trending down   Dx: unresponsiveness, hypokalemia, increased lactic acid, ETOH intoxication    Home Living:  Home Living  Home Living Comments: Pt. lives alone in a 2 level house with 4 MARYCRUZ with HR. Bed/bath on 2nd floor with tub shower with a seat and grab bars. 8-9 steps wi HR to 2nd floor. No bathroom on 1st floor.  Laundry in basement.    Prior Level of Function:  Prior Function Per Pt/Caregiver Report  Prior Function Comments: Pt. amb without AD PTA but owns a cane and a FWW. Pt was I with ADLs and IADLs PTA. Pt. reported1  fall in last 3 months. Pt. drove PTA. Pt. works as a HHA.    Precautions:  Precautions  Medical Precautions:  (Activity order: up in chair)  Precautions Comment: Per EMR: High fall risk       Objective     Pain:  Pain Assessment  Pain Assessment: 0-10  Pain Score: 0 - No pain    Cognition:  Cognition  Overall Cognitive Status: Within Functional Limits  Orientation Level: Oriented X4    General Assessments:  General Observation  General Observation: tele   Activity Tolerance  Endurance: Endurance does not limit participation in activity                 Dynamic Sitting Balance  Dynamic Sitting-Comments: Good static and dynamic sitting balance  Dynamic Standing Balance  Dynamic Standing-Comments: Good static and dynamic standing balance    Functional Assessments:     Bed Mobility  Bed Mobility: Yes  Bed Mobility 1  Bed Mobility 1: Supine to sitting, Sitting to supine  Level of Assistance 1: Modified independent  Transfers  Transfer: Yes  Transfer 1  Technique 1: Sit to stand, Stand to sit  Transfer Device 1:  (No AD)  Transfer Level of Assistance 1: Modified independent  Trials/Comments 1: A for lifting,  transferring weight over feet, steadying. VC's for hand placement  Ambulation/Gait Training  Ambulation/Gait Training Performed: Yes  Ambulation/Gait Training 1  Surface 1: Level tile  Device 1: No device  Assistance 1: Modified independent  Quality of Gait 1:  (steady, reciprocal gait with even stride lengths.)  Comments/Distance (ft) 1: 40'  Stairs  Stairs: No       Extremity/Trunk Assessments:        RLE   RLE : Within Functional Limits  LLE   LLE : Within Functional Limits    Outcome Measures:     [facility] Basic Mobility  Turning from your back to your side while in a flat bed without using bedrails: None  Moving from lying on your back to sitting on the side of a flat bed without using bedrails: None  Moving to and from bed to chair (including a wheelchair): None  Standing up from a chair using your arms (e.g. wheelchair or bedside chair): None  To walk in hospital room: None  Climbing 3-5 steps with railing: None  Basic Mobility - Total Score: 24                                            Education Documentation  Mobility Training, taught by Anselmo Welch PT at 6/13/2024  1:11 PM.  Learner: Patient  Readiness: Acceptance  Method: Explanation  Response: Verbalizes Understanding  Comment: Role of PT

## 2024-06-13 NOTE — NURSING NOTE
Provided patient with first 30 day supply of Jardiance from [employer] Educated patient on dosing instructions and answered questions. Cost of fill $4.60. Also delivered Metoprolol, Aspirin, Losartan, and Aldactone as ordered.

## 2024-06-13 NOTE — DISCHARGE SUMMARY
Discharge Diagnosis  Non-ischemic cardiomyopathy     Discharge Meds     Your medication list        START taking these medications        Instructions Last Dose Given Next Dose Due   aspirin 81 mg EC tablet      Take 1 tablet (81 mg) by mouth once daily.       empagliflozin 10 mg  Commonly known as: Jardiance      Take 1 tablet (10 mg) by mouth once daily.       losartan 25 mg tablet  Commonly known as: Cozaar      Take 1 tablet (25 mg) by mouth once daily.       metoprolol succinate XL 50 mg 24 hr tablet  Commonly known as: Toprol-XL      Take 1 tablet (50 mg) by mouth once daily. Do not crush or chew.       spironolactone 25 mg tablet  Commonly known as: Aldactone      Take 0.5 tablets (12.5 mg) by mouth once daily.              CONTINUE taking these medications        Instructions Last Dose Given Next Dose Due   DULoxetine 60 mg DR capsule  Commonly known as: Cymbalta           ezetimibe 10 mg tablet  Commonly known as: Zetia           fluticasone 50 mcg/actuation nasal spray  Commonly known as: Flonase           metFORMIN 500 mg tablet  Commonly known as: Glucophage           traZODone 50 mg tablet  Commonly known as: Desyrel           Vitamin D3 50 mcg (2,000 unit) capsule  Generic drug: cholecalciferol                  STOP taking these medications      metoprolol tartrate 50 mg tablet  Commonly known as: Lopressor                  Where to Get Your Medications        These medications were sent to [facility]  [address] [place] [id number]      Hours: 9 AM to 5:30 PM Mon-Fri, 9 AM to 1 PM Saturday Phone: [phone]   aspirin 81 mg EC tablet  empagliflozin 10 mg  losartan 25 mg tablet  metoprolol succinate XL 50 mg 24 hr tablet  spironolactone 25 mg tablet         Test Results Pending At Discharge  Pending Labs       Order Current Status    Blood Culture Preliminary result    Blood Culture Preliminary result          Lab Results   Component Value Date    HGBA1C 6.0 11/18/2020          Hospital Course: 69 year old female admitted to ICU after being found unresponsive at home    -had alcohol of 160 but no history of alcohol abuse per patient or family, cessation advised   -extubated in less than 24 hours, echo done showed EF of 45%  -LHC done on 6/12 and negative  -holter monitor to be ordered outpatient  -patient now on GDMT    DMII: continue home meds    Patient on RA and ambulating in room without difficulty. Has follow with cardiology scheduled. Plan to discharge home today. [facility] referral placed. Greater than 30 minutes of clinical time spent caring for this patient.       Pertinent Physical Exam At Time of Discharge  Gen: NAD  HEENT: EOM, MMM  CV: RRR, no murmurs rubs or gallops  Resp: Clear to auscultation bilaterally  Abdomen: soft, NT,+BS  LE: No edema      Outpatient Follow-Up  Future Appointments   Date Time Provider Department Center   7/3/2024  9:00 AM Beau Toure MD [id number] [place]         Kevin Spear MD

## 2024-06-13 NOTE — CARE PLAN
The patient's goals for the shift include  maintain safety     The clinical goals for the shift include to remain hds    Over the shift, the patient did not make progress toward the following goals. Barriers to progression include IV pole/meds. Recommendations to address these barriers include continue to monitor and call for assistance.

## 2024-06-14 ENCOUNTER — DOCUMENTATION (OUTPATIENT)
Dept: CARDIOLOGY | Facility: CLINIC | Age: 69
End: 2024-06-14
Payer: MEDICARE

## 2024-06-14 ENCOUNTER — PATIENT OUTREACH (OUTPATIENT)
Dept: CARDIOLOGY | Facility: CLINIC | Age: 69
End: 2024-06-14
Payer: MEDICARE

## 2024-06-14 NOTE — PROGRESS NOTES
Discharge Facility:  [facility]  Discharge Diagnosis:  Non-ischemic Cardiomyopathy  Admission Date: 6/11/24  Discharge Date:  6/13/24    PCP Appointment Date:  Pt encouraged to make an appt with PCP  Specialist Appointment Date: Dr Toure 7/3/24--sending a task for sooner appt  Hospital Encounter and Summary: Linked     See discharge assessment below for further details     Engagement  Call Start Time: 0958 (6/14/2024 10:05 AM)    Medications  Medications reviewed with patient/caregiver?: Yes (6/14/2024 10:05 AM)  Is the patient having any side effects they believe may be caused by any medication additions or changes?: No (6/14/2024 10:05 AM)  Does the patient have all medications ordered at discharge?: Yes (6/14/2024 10:05 AM)  Prescription Comments: Asa, Jardiance, Cozaar, Metoprolol, Spironolactone (6/14/2024 10:05 AM)  Is the patient taking all medications as directed (includes completed medication regime)?: Yes (6/14/2024 10:05 AM)  Medication Comments: Medication sent home with patient (6/14/2024 10:05 AM)    Appointments  Does the patient have a primary care provider?: Yes (6/14/2024 10:05 AM)  Care Management Interventions: Advised patient to make appointment (6/14/2024 10:05 AM)  Care Management Interventions: Advised to reschedule appointment (6/14/2024 10:05 AM)  Follow Up Tasks: Appointments (6/14/2024 10:05 AM)    Self Management  What is the home health agency[facility] (6/14/2024 10:05 AM)  What Durable Medical Equipment (DME) was ordered?: n/a (6/14/2024 10:05 AM)    Patient Teaching  Does the patient have access to their discharge instructions?: Yes (6/14/2024 10:05 AM)  Care Management Interventions: Reviewed instructions with patient (6/14/2024 10:05 AM)  What is the patient's perception of their health status since discharge?: Improving (6/14/2024 10:05 AM)  Patient/Caregiver Education Comments: Reviewed new medications. (6/14/2024 10:05 AM)    Wrap Up  Wrap Up Additional Comments: Patient  states she is feeling better at home. Patient has all new medication and reviewed with patient. Patient is aware that this CM will send a task to follow up with Dr Toure at a sooner time. Patient is encouraged to follow up with PCP. Reviewed BMP ordered 6/27/24. Patient states that [facility] has not contacted yet (6/14/2024 10:05 AM)

## 2024-06-15 LAB
BACTERIA BLD CULT: NORMAL
BACTERIA BLD CULT: NORMAL

## 2024-06-16 ENCOUNTER — PATIENT OUTREACH (OUTPATIENT)
Dept: HOME HEALTH SERVICES | Age: 69
End: 2024-06-16
Payer: MEDICARE

## 2024-06-16 LAB
ATRIAL RATE: 68 BPM
ATRIAL RATE: 94 BPM
P AXIS: 76 DEGREES
P AXIS: 80 DEGREES
P OFFSET: 182 MS
P OFFSET: 188 MS
P ONSET: 117 MS
P ONSET: 121 MS
PR INTERVAL: 196 MS
PR INTERVAL: 202 MS
Q ONSET: 218 MS
Q ONSET: 219 MS
QRS COUNT: 11 BEATS
QRS COUNT: 16 BEATS
QRS DURATION: 88 MS
QRS DURATION: 94 MS
QT INTERVAL: 408 MS
QT INTERVAL: 500 MS
QTC CALCULATION(BAZETT): 510 MS
QTC CALCULATION(BAZETT): 531 MS
QTC FREDERICIA: 473 MS
QTC FREDERICIA: 521 MS
R AXIS: 40 DEGREES
R AXIS: 52 DEGREES
T AXIS: 72 DEGREES
T AXIS: 79 DEGREES
T OFFSET: 423 MS
T OFFSET: 468 MS
VENTRICULAR RATE: 68 BPM
VENTRICULAR RATE: 94 BPM

## 2024-06-16 SDOH — HEALTH STABILITY: MENTAL HEALTH
HOW OFTEN DO YOU NEED TO HAVE SOMEONE HELP YOU WHEN YOU READ INSTRUCTIONS, PAMPHLETS, OR OTHER WRITTEN MATERIAL FROM YOUR DOCTOR OR PHARMACY?: NEVER

## 2024-06-16 SDOH — SOCIAL STABILITY: SOCIAL NETWORK
IN A TYPICAL WEEK, HOW MANY TIMES DO YOU TALK ON THE PHONE WITH FAMILY, FRIENDS, OR NEIGHBORS?: MORE THAN THREE TIMES A WEEK

## 2024-06-16 SDOH — ECONOMIC STABILITY: INCOME INSECURITY: IN THE PAST 12 MONTHS, HAS THE ELECTRIC, GAS, OIL, OR WATER COMPANY THREATENED TO SHUT OFF SERVICE IN YOUR HOME?: NO

## 2024-06-16 SDOH — SOCIAL STABILITY: SOCIAL INSECURITY: WITHIN THE LAST YEAR, HAVE YOU BEEN HUMILIATED OR EMOTIONALLY ABUSED IN OTHER WAYS BY YOUR PARTNER OR EX-PARTNER?: NO

## 2024-06-16 SDOH — ECONOMIC STABILITY: HOUSING INSECURITY
IN THE LAST 12 MONTHS, WAS THERE A TIME WHEN YOU DID NOT HAVE A STEADY PLACE TO SLEEP OR SLEPT IN A SHELTER (INCLUDING NOW)?: NO

## 2024-06-16 SDOH — HEALTH STABILITY: MENTAL HEALTH
STRESS IS WHEN SOMEONE FEELS TENSE, NERVOUS, ANXIOUS, OR CAN'T SLEEP AT NIGHT BECAUSE THEIR MIND IS TROUBLED. HOW STRESSED ARE YOU?: TO SOME EXTENT

## 2024-06-16 SDOH — ECONOMIC STABILITY: HOUSING INSECURITY: IN THE LAST 12 MONTHS, HOW MANY PLACES HAVE YOU LIVED?: 1

## 2024-06-16 SDOH — ECONOMIC STABILITY: TRANSPORTATION INSECURITY
IN THE PAST 12 MONTHS, HAS LACK OF TRANSPORTATION KEPT YOU FROM MEETINGS, WORK, OR FROM GETTING THINGS NEEDED FOR DAILY LIVING?: NO

## 2024-06-16 SDOH — ECONOMIC STABILITY: FOOD INSECURITY: WITHIN THE PAST 12 MONTHS, YOU WORRIED THAT YOUR FOOD WOULD RUN OUT BEFORE YOU GOT MONEY TO BUY MORE.: NEVER TRUE

## 2024-06-16 SDOH — SOCIAL STABILITY: SOCIAL NETWORK
DO YOU BELONG TO ANY CLUBS OR ORGANIZATIONS SUCH AS CHURCH GROUPS UNIONS, FRATERNAL OR ATHLETIC GROUPS, OR SCHOOL GROUPS?: YES

## 2024-06-16 SDOH — HEALTH STABILITY: MENTAL HEALTH: HOW OFTEN DO YOU HAVE 6 OR MORE DRINKS ON ONE OCCASION?: NEVER

## 2024-06-16 SDOH — ECONOMIC STABILITY: FOOD INSECURITY: WITHIN THE PAST 12 MONTHS, THE FOOD YOU BOUGHT JUST DIDN'T LAST AND YOU DIDN'T HAVE MONEY TO GET MORE.: NEVER TRUE

## 2024-06-16 SDOH — ECONOMIC STABILITY: TRANSPORTATION INSECURITY
IN THE PAST 12 MONTHS, HAS THE LACK OF TRANSPORTATION KEPT YOU FROM MEDICAL APPOINTMENTS OR FROM GETTING MEDICATIONS?: NO

## 2024-06-16 SDOH — SOCIAL STABILITY: SOCIAL NETWORK: HOW OFTEN DO YOU ATTEND CHURCH OR RELIGIOUS SERVICES?: MORE THAN 4 TIMES PER YEAR

## 2024-06-16 SDOH — ECONOMIC STABILITY: INCOME INSECURITY: IN THE LAST 12 MONTHS, WAS THERE A TIME WHEN YOU WERE NOT ABLE TO PAY THE MORTGAGE OR RENT ON TIME?: NO

## 2024-06-16 SDOH — HEALTH STABILITY: MENTAL HEALTH: HOW MANY STANDARD DRINKS CONTAINING ALCOHOL DO YOU HAVE ON A TYPICAL DAY?: 1 OR 2

## 2024-06-16 SDOH — SOCIAL STABILITY: SOCIAL NETWORK: HOW OFTEN DO YOU GET TOGETHER WITH FRIENDS OR RELATIVES?: MORE THAN THREE TIMES A WEEK

## 2024-06-16 SDOH — SOCIAL STABILITY: SOCIAL INSECURITY: WITHIN THE LAST YEAR, HAVE YOU BEEN AFRAID OF YOUR PARTNER OR EX-PARTNER?: NO

## 2024-06-16 SDOH — SOCIAL STABILITY: SOCIAL INSECURITY
WITHIN THE LAST YEAR, HAVE YOU BEEN KICKED, HIT, SLAPPED, OR OTHERWISE PHYSICALLY HURT BY YOUR PARTNER OR EX-PARTNER?: NO

## 2024-06-16 SDOH — HEALTH STABILITY: MENTAL HEALTH: HOW OFTEN DO YOU HAVE A DRINK CONTAINING ALCOHOL?: MONTHLY OR LESS

## 2024-06-16 SDOH — HEALTH STABILITY: PHYSICAL HEALTH: ON AVERAGE, HOW MANY MINUTES DO YOU ENGAGE IN EXERCISE AT THIS LEVEL?: 30 MIN

## 2024-06-16 SDOH — SOCIAL STABILITY: SOCIAL NETWORK: HOW OFTEN DO YOU ATTENT MEETINGS OF THE CLUB OR ORGANIZATION YOU BELONG TO?: MORE THAN 4 TIMES PER YEAR

## 2024-06-16 SDOH — SOCIAL STABILITY: SOCIAL INSECURITY
WITHIN THE LAST YEAR, HAVE TO BEEN RAPED OR FORCED TO HAVE ANY KIND OF SEXUAL ACTIVITY BY YOUR PARTNER OR EX-PARTNER?: NO

## 2024-06-16 SDOH — HEALTH STABILITY: PHYSICAL HEALTH: ON AVERAGE, HOW MANY DAYS PER WEEK DO YOU ENGAGE IN MODERATE TO STRENUOUS EXERCISE (LIKE A BRISK WALK)?: 7 DAYS

## 2024-06-16 SDOH — SOCIAL STABILITY: SOCIAL NETWORK: ARE YOU MARRIED, WIDOWED, DIVORCED, SEPARATED, NEVER MARRIED, OR LIVING WITH A PARTNER?: PATIENT DECLINED

## 2024-06-16 SDOH — ECONOMIC STABILITY: INCOME INSECURITY: HOW HARD IS IT FOR YOU TO PAY FOR THE VERY BASICS LIKE FOOD, HOUSING, MEDICAL CARE, AND HEATING?: NOT HARD AT ALL

## 2024-06-16 ASSESSMENT — LIFESTYLE VARIABLES
SKIP TO QUESTIONS 9-10: 1
AUDIT-C TOTAL SCORE: 1

## 2024-06-16 NOTE — PROGRESS NOTES
Enrollment call completed. Pt with hx chf, dm. Does not have equipment at home for bgt checks. Willing to check if she can get equipment. Has scale and bp cuff. Will check and log daily. Denies need for assistance with medication. Outside PCP, does not have appt set up. States she will call Monday to schedule. Aware of daily calls, any time okay. [facility]    Patient's Address:   [address] [place] [address]  **  If this is not the address patient will receive services - alert team and address in EMR**       Patient Contacts:  Extended Emergency Contact Information  Primary Emergency Contact: Jacinta Jones  Mobile Phone: [phone]  Relation: Relative  Preferred language: English   needed? No  Secondary Emergency Contact: January Gray  Home Phone: [phone]  Relation: Sibling                                Patient's Preferred Phone: [phone]  Patient's E-mail: No e-mail address on record

## 2024-06-17 NOTE — NURSING NOTE
Follow up phone call made by CHF Clinical Nurse Navigator. Spoke with patient who states she is doing ok. She states she has all her medications and is taking everything as prescribed. She is active with [facility]. She was not aware of her follow up appointment with Dr. Toure. We went over date, time and location of appointment. Also went over having her labs drawn on 6/27/24. She wrote all this information down. Let patient know she needs to schedule a follow up appointment with her PCP. Asked patient if she would like assistance with doing this and she declined. No other questions or concerns at this time. Reminded patient of my contact information should any other questions or concerns arise.

## 2024-06-18 ENCOUNTER — PATIENT OUTREACH (OUTPATIENT)
Dept: HOME HEALTH SERVICES | Age: 69
End: 2024-06-18

## 2024-06-18 ENCOUNTER — APPOINTMENT (OUTPATIENT)
Dept: CARE COORDINATION | Age: 69
End: 2024-06-18
Payer: MEDICARE

## 2024-06-18 ENCOUNTER — TELEMEDICINE (OUTPATIENT)
Dept: PHARMACY | Facility: HOSPITAL | Age: 69
End: 2024-06-18
Payer: MEDICARE

## 2024-06-18 DIAGNOSIS — I50.31 ACUTE DIASTOLIC CONGESTIVE HEART FAILURE (MULTI): ICD-10-CM

## 2024-06-18 DIAGNOSIS — I10 ESSENTIAL HYPERTENSION: Primary | ICD-10-CM

## 2024-06-18 DIAGNOSIS — I50.31 ACUTE DIASTOLIC CONGESTIVE HEART FAILURE (MULTI): Primary | ICD-10-CM

## 2024-06-18 RX ORDER — ACETAMINOPHEN 500 MG
TABLET ORAL
Start: 2024-06-18

## 2024-06-18 NOTE — PROGRESS NOTES
Daily Call Note:       Ms. Calhoun states she has a scale but she did not weigh herself.  Patient states she will weigh herself when she feels better.  Advised Ms. Calhoun to weigh herself daily, starting tomorrow.  Patient agrees with plan.     Patient denies trouble breathing  Patient does not check blood sugar, patient is on oral diabetic medications.   Patient does not have a blood pressure monitor.  Anibal placed an ordered.  Notified Kristin.  Kristin aware of order     Scheduled Hospital Follow-up.  Provider is NOT A  PROVIDER  Thursday, 6/20/24 15:45   Ray Toward   [address] [place], [place]  Valorie Burdick was out of the office  [facility]   [phone]      Pt Education:   Barriers:   Topics for Daily Review:   Pt demonstrates clear understanding:     Daily Weight:  There were no vitals filed for this visit.   Last 3 Weights:  Wt Readings from Last 7 Encounters:   06/13/24 115 kg (252 lb 6.8 oz)   06/10/24 103 kg (227 lb 1.2 oz)   02/05/24 107 kg (235 lb)   10/30/23 110 kg (242 lb)   10/24/23 110 kg (242 lb 12.8 oz)   04/21/20 111 kg (245 lb)       Masimo Device:    Masimo Clinical Impression:     Virtual Visits--Scheduled (Most Recent Date at Top)  Follow up Appointments  Recent Visits  No visits were found meeting these conditions.  Showing recent visits within past 30 days and meeting all other requirements  Future Appointments  No visits were found meeting these conditions.  Showing future appointments within next 90 days and meeting all other requirements       Frequency of RN Calls & Virtual Visits per Team Agreement:     Medication issues Addressed (what was done):     Follow up appointments scheduled by [facility] Staff:   Referrals made by [facility] staff:

## 2024-06-18 NOTE — PROGRESS NOTES
Pharmacy Post-Discharge Visit    Myrna Calhoun is a 69 y.o. female was referred to Clinical Pharmacy Team to complete a post-discharge medication optimization and monitoring visit.  The patient was referred for their CHF management. She is also currently enrolled in our [facility].     Admission Date: 6/10/24  Discharge Date: 6/13/24    Referring Provider: Cheryl Izaguirre MD  PCP: Valorie Burdick MD - not with        Subjective   Allergies   Allergen Reactions    Simvastatin Cough       [id number] - [place], [place] - [address] [facility] [address] [facility] [address]  [address] [facility] [address]  Phone: [phone] Fax: [phone]    [facility]  [address]  [facility] [address]  Phone: [phone] Fax: [phone]      Medication System Management:  Affordability/Accessibility: no issues   Adherence/Organization: no concerns       Social History     Social History Narrative    Not on file        Notable Medication changes following discharge:  Start: aspirin, jardiance, metoprolol ER, spironolactone and losartan   Stop: metoprolol tartrate   Change: none     HPI  CHF ASSESSMENT  Staging:  Most recent ejection fraction: 45%  NYHA Stage: II  ACC/AHA Stage: C    Symptom Assessment:  Weight changes/edema?: No  Dyspnea?: None  Dizziness/syncope/palpitations?: No    Current Regimen:  ARNI/ACEi/ARB: Yes - losartan  Beta Blocker: Yes - metoprolol  MRA: Yes - spironolactone   SGLT2i: Yes - jardiance     Other therapy:  Zetia     Secondary Prevention:  The 10-year ASCVD risk score (Daniel ARREDONDO, et al., 2019) is: 12.2%    Values used to calculate the score:      Age: 69 years      Sex: Female      Is Non- : Yes      Diabetic: No      Tobacco smoker: No      Systolic Blood Pressure: 141 mmHg      Is BP treated: Yes      HDL Cholesterol: 45.5 mg/dL      Total Cholesterol: 177 mg/dL    Aspirin 81mg? yes  Statin?: No  HTN?: No     Review of  Systems        Objective     There were no vitals taken for this visit.   BP Readings from Last 4 Encounters:   06/13/24 141/65   06/10/24 (!) 148/99   02/06/24 154/83   10/30/23 134/89      There were no vitals filed for this visit.     LAB  Lab Results   Component Value Date    BILITOT 1.0 06/13/2024    CALCIUM 8.6 06/13/2024    CO2 24 06/13/2024     (H) 06/13/2024    CREATININE 1.06 (H) 06/13/2024    GLUCOSE 90 06/13/2024    ALKPHOS 40 06/13/2024    K 4.3 06/13/2024    PROT 6.3 (L) 06/13/2024     06/13/2024    AST 34 06/13/2024    ALT 40 06/13/2024    BUN 19 06/13/2024    ANIONGAP 11 06/13/2024    MG 2.19 06/13/2024    PHOS 2.1 (L) 06/12/2024    ALBUMIN 3.4 06/13/2024    LIPASE 27 02/05/2024    GFRF 73 06/21/2023     Lab Results   Component Value Date    TRIG 309 (H) 06/11/2024    CHOL 177 06/11/2024    LDLCALC 70 06/11/2024    HDL 45.5 06/11/2024     Lab Results   Component Value Date    HGBA1C 6.0 11/18/2020         Current Outpatient Medications   Medication Instructions    aspirin 81 mg, oral, Daily    DULoxetine (CYMBALTA) 60 mg, oral, Daily    ezetimibe (ZETIA) 10 mg, oral, Daily    fluticasone (Flonase) 50 mcg/actuation nasal spray Administer 1 spray into each nostril once daily as needed for allergies.    Jardiance 10 mg, oral, Daily    losartan (COZAAR) 25 mg, oral, Daily    metFORMIN (GLUCOPHAGE) 500 mg, oral, 2 times daily, With morning and evening meals    metoprolol succinate XL (TOPROL-XL) 50 mg, oral, Daily, Do not crush or chew.    spironolactone (ALDACTONE) 12.5 mg, oral, Daily    traZODone (Desyrel) 50 mg tablet Take 1 tablet (50 mg) by mouth as needed at bedtime.    Vitamin D3 50 mcg (2,000 unit) capsule Take 1 capsule (50 mcg) by mouth early in the morning.. With a meal        HISTORICAL PHARMACOTHERAPY  GDMT --> was only taking metoprolol tartrate prior to admission     DRUG INTERACTIONS  None at time of review      Assessment/Plan   Problem List Items Addressed This Visit        Acute diastolic congestive heart failure (Multi)     CHF  Most recent EF was 45% done while admitted   Current GDMT --> metoprolol, losartan, jardiance and spironolactone   Has been taking 25mg of spironolactone since being home so went over it should be only 1/2 tab daily   No diuretic   Has scale at home but has not been checking daily weights --> discussed to start doing this daily and keep log   Does not have BP cuff --> will place order for melecio to deliver   Pre-DM  Most recent A1c 6% but done in 2020   Currently taking metformin 500mg BID   Not checking sugars though   Also on jardiance daily now as well     Follow Up: 1 week     Continue all meds under the continuation of care with the referring provider and clinical pharmacy team.    Anibal Cruz PharmD     Verbal consent to manage patient's drug therapy was obtained from the patient. They were informed they may decline to participate or withdraw from participation in pharmacy services at any time.

## 2024-06-19 ENCOUNTER — PATIENT OUTREACH (OUTPATIENT)
Dept: HOME HEALTH SERVICES | Age: 69
End: 2024-06-19
Payer: MEDICARE

## 2024-06-19 NOTE — PROGRESS NOTES
Daily Call Note: [facility] daily call complete.  Pt  reports she is feeling well.  Denies CP/SOB.  Pt not weight herself this am.  Verified upcoming weekly [facility] call.      Pt Education:   Barriers:   Topics for Daily Review:   Pt demonstrates clear understanding: Yes    Daily Weight:  There were no vitals filed for this visit.   Last 3 Weights:  Wt Readings from Last 7 Encounters:   06/13/24 115 kg (252 lb 6.8 oz)   06/10/24 103 kg (227 lb 1.2 oz)   02/05/24 107 kg (235 lb)   10/30/23 110 kg (242 lb)   10/24/23 110 kg (242 lb 12.8 oz)   04/21/20 111 kg (245 lb)       Masimo Device: No   Masimo Clinical Impression:     Virtual Visits--Scheduled (Most Recent Date at Top)  Follow up Appointments  Recent Visits  No visits were found meeting these conditions.  Showing recent visits within past 30 days and meeting all other requirements  Future Appointments  No visits were found meeting these conditions.  Showing future appointments within next 90 days and meeting all other requirements       Frequency of RN Calls & Virtual Visits per Team Agreement: [facility] Frequency: Daily    Medication issues Addressed (what was done):     Follow up appointments scheduled by [facility] Staff:   Referrals made by [facility] staff:

## 2024-06-20 ENCOUNTER — PATIENT OUTREACH (OUTPATIENT)
Dept: CARE COORDINATION | Age: 69
End: 2024-06-20
Payer: MEDICARE

## 2024-06-20 ENCOUNTER — DOCUMENTATION (OUTPATIENT)
Dept: CARE COORDINATION | Age: 69
End: 2024-06-20
Payer: MEDICARE

## 2024-06-20 NOTE — PROGRESS NOTES
Contacted patient in regards to [facility] referral for resource assist. Spoke w/PT in regards to recieving an Medical Alert. Her INS does not provide the device. I was able to locate a resource to assist her, but she decline on the offer. She did not want to go through the hassel of the application & providing income verification.      Discussed the following topics on behalf of the patient:  []  Behavioral Health Assistance     []  Case Management  []   Assistance  []  Digital Equity Assistance  []  Dental Health Assistance  []  Education Assistance  []  Employment Assistance  []  Financial Strain Relief Assistance  []  Food Insecurity Assistance  []  Healthcare Coverage Assistance  []  Housing Stability Assistance  []  IP Violence Relief Assistance  []  Legal Assistance  []  Physical Activity Assistance  []  Social Connection Assistance  []  Stress Relief Assistance   []  Substance Abuse Assistance  []  Transportation Assistance  []  Utility Assistance  [x]  Other: [MEDICAL ALERT DEVICE]      Citlaly Villanueva LCSW

## 2024-06-21 ENCOUNTER — PATIENT OUTREACH (OUTPATIENT)
Dept: HOME HEALTH SERVICES | Age: 69
End: 2024-06-21
Payer: MEDICARE

## 2024-06-21 VITALS — BODY MASS INDEX: 37.25 KG/M2 | WEIGHT: 245 LBS

## 2024-06-21 NOTE — PROGRESS NOTES
Daily Call Note: Daily call complete. Patient is doing ok, she is tired and taking a lot of naps, she thinks it because of the heat and she bored. She is still waiting on BP cuff. She states she does not have a glucometer to check blood sugar. Secure chat to Anibal Cruz PharmD to place order for glucometer and supplies. Next [facility] 6/25/24 at 1630, verbalized understanding. No other questions at this time.     Pt Education: per POC  Barriers: none  Topics for Daily Review:   Pt demonstrates clear understanding: Yes    Daily Weight:  There were no vitals filed for this visit.   Last 3 Weights:  Wt Readings from Last 7 Encounters:   06/13/24 115 kg (252 lb 6.8 oz)   06/10/24 103 kg (227 lb 1.2 oz)   02/05/24 107 kg (235 lb)   10/30/23 110 kg (242 lb)   10/24/23 110 kg (242 lb 12.8 oz)   04/21/20 111 kg (245 lb)       Masimo Device: No   Masimo Clinical Impression: n/a    Virtual Visits--Scheduled (Most Recent Date at Top)  Follow up Appointments  Recent Visits  No visits were found meeting these conditions.  Showing recent visits within past 30 days and meeting all other requirements  Future Appointments  No visits were found meeting these conditions.  Showing future appointments within next 90 days and meeting all other requirements       Frequency of RN Calls & Virtual Visits per Team Agreement: [facility] Frequency: Daily    Medication issues Addressed (what was done): none    Follow up appointments scheduled by [facility] Staff: none  Referrals made by [facility] staff: none

## 2024-06-22 ENCOUNTER — PATIENT OUTREACH (OUTPATIENT)
Dept: HOME HEALTH SERVICES | Age: 69
End: 2024-06-22
Payer: MEDICARE

## 2024-06-22 NOTE — PROGRESS NOTES
Daily Call Note:   [facility] daily call complete.  Pt reports she is feeling well.  Denies CP/SOB.  No concerns voiced.  Pt does not have glucometer, secure message sent to Anibal.    Verified upcoming weekly [facility] call.      Pt Education:  POC   Barriers:   Topics for Daily Review:   Pt demonstrates clear understanding: Yes    Daily Weight:  There were no vitals filed for this visit.   Last 3 Weights:  Wt Readings from Last 7 Encounters:   06/21/24 111 kg (245 lb)   06/13/24 115 kg (252 lb 6.8 oz)   06/10/24 103 kg (227 lb 1.2 oz)   02/05/24 107 kg (235 lb)   10/30/23 110 kg (242 lb)   10/24/23 110 kg (242 lb 12.8 oz)   04/21/20 111 kg (245 lb)       Masimo Device: No   Masimo Clinical Impression:     Virtual Visits--Scheduled (Most Recent Date at Top)  Follow up Appointments  Recent Visits  No visits were found meeting these conditions.  Showing recent visits within past 30 days and meeting all other requirements  Future Appointments  No visits were found meeting these conditions.  Showing future appointments within next 90 days and meeting all other requirements       Frequency of RN Calls & Virtual Visits per Team Agreement: [facility] Frequency: Daily    Medication issues Addressed (what was done):     Follow up appointments scheduled by [facility] Staff:   Referrals made by [facility] staff:

## 2024-06-24 ENCOUNTER — PATIENT OUTREACH (OUTPATIENT)
Dept: HOME HEALTH SERVICES | Age: 69
End: 2024-06-24
Payer: MEDICARE

## 2024-06-24 NOTE — PROGRESS NOTES
Daily Call Note: Daily call complete. Patient states she is doing good. She states she did not receive BP cuff yet, secure chat to Lita Kaminski at [place] to check status. She states she still does not have her glucometer, she is waiting for it to be delivered. I suggested she check with her pharmacy, verbalized understanding. She is considering CGM, she will think about it and write down any questions she has for next [facility] 6/25/24 at 1630, verbalized understanding. No other questions at this time.     Pt Education: per POC  Barriers: none  Topics for Daily Review: blood sugar  Pt demonstrates clear understanding: Yes    Daily Weight:  There were no vitals filed for this visit.   Last 3 Weights:  Wt Readings from Last 7 Encounters:   06/21/24 111 kg (245 lb)   06/13/24 115 kg (252 lb 6.8 oz)   06/10/24 103 kg (227 lb 1.2 oz)   02/05/24 107 kg (235 lb)   10/30/23 110 kg (242 lb)   10/24/23 110 kg (242 lb 12.8 oz)   04/21/20 111 kg (245 lb)       Masimo Device: No   Masimo Clinical Impression: n/a    Virtual Visits--Scheduled (Most Recent Date at Top)  Follow up Appointments  Recent Visits  No visits were found meeting these conditions.  Showing recent visits within past 30 days and meeting all other requirements  Future Appointments  No visits were found meeting these conditions.  Showing future appointments within next 90 days and meeting all other requirements       Frequency of RN Calls & Virtual Visits per Team Agreement: [facility] Frequency: Daily    Medication issues Addressed (what was done): none    Follow up appointments scheduled by [facility] Staff: none  Referrals made by [facility] staff: none

## 2024-06-25 ENCOUNTER — TELEMEDICINE (OUTPATIENT)
Dept: PHARMACY | Facility: HOSPITAL | Age: 69
End: 2024-06-25
Payer: MEDICARE

## 2024-06-25 ENCOUNTER — PATIENT OUTREACH (OUTPATIENT)
Dept: HOME HEALTH SERVICES | Age: 69
End: 2024-06-25

## 2024-06-25 ENCOUNTER — APPOINTMENT (OUTPATIENT)
Dept: CARE COORDINATION | Age: 69
End: 2024-06-25
Payer: MEDICARE

## 2024-06-25 DIAGNOSIS — I50.31 ACUTE DIASTOLIC CONGESTIVE HEART FAILURE (MULTI): Primary | ICD-10-CM

## 2024-06-25 DIAGNOSIS — E11.9 TYPE 2 DIABETES MELLITUS WITHOUT COMPLICATION, WITHOUT LONG-TERM CURRENT USE OF INSULIN (MULTI): ICD-10-CM

## 2024-06-25 PROBLEM — R41.89 UNRESPONSIVE: Status: RESOLVED | Noted: 2024-06-11 | Resolved: 2024-06-25

## 2024-06-25 PROBLEM — I50.20 HFREF (HEART FAILURE WITH REDUCED EJECTION FRACTION) (MULTI): Status: ACTIVE | Noted: 2024-06-25

## 2024-06-25 PROBLEM — Z12.11 COLON CANCER SCREENING: Status: RESOLVED | Noted: 2023-10-24 | Resolved: 2024-06-25

## 2024-06-25 PROBLEM — I49.3 PVC (PREMATURE VENTRICULAR CONTRACTION): Status: RESOLVED | Noted: 2024-06-10 | Resolved: 2024-06-25

## 2024-06-25 PROBLEM — R55 SYNCOPE AND COLLAPSE: Status: RESOLVED | Noted: 2024-06-10 | Resolved: 2024-06-25

## 2024-06-25 NOTE — PROGRESS NOTES
"Daily Call Note: Weekly [facility] call completed with patient, this nurse,  and Anibal the pharmacist, she states she is getting better every day, she tells us she has a scale ,did not check weight today, also reports to us that she can purchase a bp cuff, so we can daily measurement of bp and wt, confirms to understand. Has question regarding \"life alert, will refer to SW to see if they can help in obtaining this for patient.Denies any other concerns /questions at this time. [facility] scheduled 7/3@1700    Pt Education: importance of BP and daily weights  Barriers: needs bp cuff  Topics for Daily Review: wt/bp  Pt demonstrates clear understanding: Yes    Daily Weight:  There were no vitals filed for this visit.   Last 3 Weights:  Wt Readings from Last 7 Encounters:   06/21/24 111 kg (245 lb)   06/13/24 115 kg (252 lb 6.8 oz)   06/10/24 103 kg (227 lb 1.2 oz)   02/05/24 107 kg (235 lb)   10/30/23 110 kg (242 lb)   10/24/23 110 kg (242 lb 12.8 oz)   04/21/20 111 kg (245 lb)       Masimo Device: No   Masimo Clinical Impression:     Virtual Visits--Scheduled (Most Recent Date at Top)  Follow up Appointments  Recent Visits  No visits were found meeting these conditions.  Showing recent visits within past 30 days and meeting all other requirements  Future Appointments  No visits were found meeting these conditions.  Showing future appointments within next 90 days and meeting all other requirements       Frequency of RN Calls & Virtual Visits per Team Agreement: [facility] Frequency: Daily                 "

## 2024-06-25 NOTE — PROGRESS NOTES
Pharmacy Post-Discharge Visit - Follow Up     Myrna Calhoun is a 69 y.o. female was referred to Clinical Pharmacy Team to complete a post-discharge medication optimization and monitoring visit.  The patient was referred for their CHF management and monitoring sugars as she is pre-diabetic, all while currently enrolled in [facility].     Referring Provider: Taran Contreras MD  PCP: Valorie Burdick MD - not with        Subjective   Allergies   Allergen Reactions    Simvastatin Cough       [id number] - [place], [place] - [address]  [address] [address]  Phone: [phone] Fax: [phone]    [facility]  [address]  [place] [address]  Phone: [phone] Fax: [phone]      Medication System Management:  Affordability/Accessibility: no concerns   Adherence/Organization: no issues       Social History     Social History Narrative    Not on file          HPI  CHF ASSESSMENT  Staging:  Most recent ejection fraction: 45%  NYHA Stage: II  ACC/AHA Stage: C     Symptom Assessment:  Weight changes/edema?: Unknown   Dyspnea?: None  Dizziness/syncope/palpitations?: No     Current Regimen:  ARNI/ACEi/ARB: Yes - losartan  Beta Blocker: Yes - metoprolol  MRA: Yes - spironolactone   SGLT2i: Yes - jardiance      Other therapy:  Zetia      Secondary Prevention:  The 10-year ASCVD risk score (Daniel ARREDONDO, et al., 2019) is: 12.2%    Values used to calculate the score:      Age: 69 years      Sex: Female      Is Non- : Yes      Diabetic: No      Tobacco smoker: No      Systolic Blood Pressure: 141 mmHg      Is BP treated: Yes      HDL Cholesterol: 45.5 mg/dL      Total Cholesterol: 177 mg/dL     Aspirin 81mg? yes  Statin?: No  HTN?: No     Review of Systems        Objective     There were no vitals taken for this visit.   BP Readings from Last 4 Encounters:   06/13/24 141/65   06/10/24 (!) 148/99   02/06/24 154/83   10/30/23  134/89      There were no vitals filed for this visit.     LAB  Lab Results   Component Value Date    BILITOT 1.0 06/13/2024    CALCIUM 8.6 06/13/2024    CO2 24 06/13/2024     (H) 06/13/2024    CREATININE 1.06 (H) 06/13/2024    GLUCOSE 90 06/13/2024    ALKPHOS 40 06/13/2024    K 4.3 06/13/2024    PROT 6.3 (L) 06/13/2024     06/13/2024    AST 34 06/13/2024    ALT 40 06/13/2024    BUN 19 06/13/2024    ANIONGAP 11 06/13/2024    MG 2.19 06/13/2024    PHOS 2.1 (L) 06/12/2024    ALBUMIN 3.4 06/13/2024    LIPASE 27 02/05/2024    GFRF 73 06/21/2023     Lab Results   Component Value Date    TRIG 309 (H) 06/11/2024    CHOL 177 06/11/2024    LDLCALC 70 06/11/2024    HDL 45.5 06/11/2024     Lab Results   Component Value Date    HGBA1C 6.0 11/18/2020         Current Outpatient Medications   Medication Instructions    aspirin 81 mg, oral, Daily    blood pressure monitor kit Use as directed    blood sugar diagnostic (Blood Glucose Test) strip Use to check sugars up to 4x daily    blood-glucose meter misc Use as directed    DULoxetine (CYMBALTA) 60 mg, oral, Daily    ezetimibe (ZETIA) 10 mg, oral, Daily    fluticasone (Flonase) 50 mcg/actuation nasal spray Administer 1 spray into each nostril once daily as needed for allergies.    Jardiance 10 mg, oral, Daily    lancets misc Use to check sugars up to 4x daily    losartan (COZAAR) 25 mg, oral, Daily    metFORMIN (GLUCOPHAGE) 500 mg, oral, 2 times daily, With morning and evening meals    metoprolol succinate XL (TOPROL-XL) 50 mg, oral, Daily, Do not crush or chew.    spironolactone (ALDACTONE) 12.5 mg, oral, Daily    traZODone (Desyrel) 50 mg tablet Take 1 tablet (50 mg) by mouth as needed at bedtime.    Vitamin D3 50 mcg (2,000 unit) capsule Take 1 capsule (50 mcg) by mouth early in the morning.. With a meal            Assessment/Plan   Problem List Items Addressed This Visit    None    CHF  Most recent EF was 45% done while admitted   Current GDMT --> metoprolol,  losartan, jardiance and spironolactone   Has been taking 25mg of spironolactone since being home so went over it should be only 1/2 tab daily   No diuretic   Has scale at home but has not been checking daily weights --> discussed to start doing this daily and keep log   Was not able to get BP cuff from [facility], but said she would be able to buy one on her own   Will start keeping log of both daily for next visit   Pre-DM  Most recent A1c 6% but done in 2020   Currently taking metformin 500mg BID   Not checking sugars though   Also on jardiance daily now as well   Sent her a new glucometer and testing supplies so she can start monitoring     Follow Up: 1 week     Continue all meds under the continuation of care with the referring provider and clinical pharmacy team.    Anibal Cruz, PharmD     Verbal consent to manage patient's drug therapy was obtained from the patient. They were informed they may decline to participate or withdraw from participation in pharmacy services at any time.

## 2024-06-26 ENCOUNTER — PATIENT OUTREACH (OUTPATIENT)
Dept: HOME HEALTH SERVICES | Age: 69
End: 2024-06-26
Payer: MEDICARE

## 2024-06-26 NOTE — PROGRESS NOTES
Daily Call Note: [facility] daily call complete.  No concerns voiced.  Pt denies CP/SOB.  Verified upcoming weekly [facility] call    Pt Education: POC  Barriers:   Topics for Daily Review:   Pt demonstrates clear understanding: Yes    Daily Weight:  There were no vitals filed for this visit.   Last 3 Weights:  Wt Readings from Last 7 Encounters:   06/21/24 111 kg (245 lb)   06/13/24 115 kg (252 lb 6.8 oz)   06/10/24 103 kg (227 lb 1.2 oz)   02/05/24 107 kg (235 lb)   10/30/23 110 kg (242 lb)   10/24/23 110 kg (242 lb 12.8 oz)   04/21/20 111 kg (245 lb)       Masimo Device: No   Masimo Clinical Impression:     Virtual Visits--Scheduled (Most Recent Date at Top)  Follow up Appointments  Recent Visits  No visits were found meeting these conditions.  Showing recent visits within past 30 days and meeting all other requirements  Future Appointments  No visits were found meeting these conditions.  Showing future appointments within next 90 days and meeting all other requirements       Frequency of RN Calls & Virtual Visits per Team Agreement: [facility] Frequency: Daily    Medication issues Addressed (what was done):     Follow up appointments scheduled by [facility] Staff:   Referrals made by [facility] staff:

## 2024-06-27 ENCOUNTER — PATIENT OUTREACH (OUTPATIENT)
Dept: CARDIOLOGY | Facility: CLINIC | Age: 69
End: 2024-06-27
Payer: MEDICARE

## 2024-06-28 ENCOUNTER — PATIENT OUTREACH (OUTPATIENT)
Dept: HOME HEALTH SERVICES | Age: 69
End: 2024-06-28
Payer: MEDICARE

## 2024-06-28 NOTE — PROGRESS NOTES
1923- Daily call completed with patient. She states she is at her sister's house at the moment & is doing well today. Denied having any needs or concerns to be addressed this evening. Did not obtain daily weight, states she was going to wait to start checking daily weights until she had her BP cuff & glucometer. Educated importance of obtaining & logging weights daily & encouraged to do so. She states her sister said she looks like she's lost weight. Aware of next [facility] 7/3 @ 1700.

## 2024-06-29 ENCOUNTER — PATIENT OUTREACH (OUTPATIENT)
Dept: HOME HEALTH SERVICES | Age: 69
End: 2024-06-29
Payer: MEDICARE

## 2024-06-29 NOTE — PROGRESS NOTES
"Daily Call Note:  [facility] daily call complete.  Pt report she is feeling well.  Denies CP/ SOB.  Has not yet weight herself, states she is still in bed.  Has not picked up her glucometer.  This RN informed [facility], she states \" I know, I have not gone.\".  No concerns voiced.  Verified upcoming weekly [facility] call.      Pt Education:  POC   Barriers:   Topics for Daily Review:   Pt demonstrates clear understanding: Yes    Daily Weight:  There were no vitals filed for this visit.   Last 3 Weights:  Wt Readings from Last 7 Encounters:   06/21/24 111 kg (245 lb)   06/13/24 115 kg (252 lb 6.8 oz)   06/10/24 103 kg (227 lb 1.2 oz)   02/05/24 107 kg (235 lb)   10/30/23 110 kg (242 lb)   10/24/23 110 kg (242 lb 12.8 oz)   04/21/20 111 kg (245 lb)       Masimo Device: No   Masimo Clinical Impression:     Virtual Visits--Scheduled (Most Recent Date at Top)  Follow up Appointments  Recent Visits  No visits were found meeting these conditions.  Showing recent visits within past 30 days and meeting all other requirements  Future Appointments  No visits were found meeting these conditions.  Showing future appointments within next 90 days and meeting all other requirements       Frequency of RN Calls & Virtual Visits per Team Agreement: [facility] Home Frequency: Daily    Medication issues Addressed (what was done):     Follow up appointments scheduled by [facility] Staff:   Referrals made by [facility] staff:              "

## 2024-07-01 ENCOUNTER — PATIENT OUTREACH (OUTPATIENT)
Dept: HOME HEALTH SERVICES | Age: 69
End: 2024-07-01
Payer: MEDICARE

## 2024-07-01 NOTE — PROGRESS NOTES
"1922- Daily call completed. Patient had a very good day today. Did not  glucometer from [facility] today, because she got out of work too late. Plans to pick it up tomorrow, 7/2. Still not obtaining daily weight. States she knows she should be, but plans on \"doing everything daily\" once she gets glucometer. Educated again on importance of obtaining weight daily in relation to CHF.     Voiced muscle pain in neck since fall. Has not been doing/taking anything to relieve pain. Encouraged heat therapy. States she just got a new heating pad and plans to use it tonight.     Reminded of next [facility] 7/3 @ 530 pm.   "

## 2024-07-02 ENCOUNTER — APPOINTMENT (OUTPATIENT)
Dept: CARE COORDINATION | Age: 69
End: 2024-07-02
Payer: MEDICARE

## 2024-07-02 ENCOUNTER — PATIENT OUTREACH (OUTPATIENT)
Dept: CARE COORDINATION | Age: 69
End: 2024-07-02

## 2024-07-02 ENCOUNTER — APPOINTMENT (OUTPATIENT)
Dept: PHARMACY | Facility: HOSPITAL | Age: 69
End: 2024-07-02
Payer: MEDICARE

## 2024-07-02 NOTE — PROGRESS NOTES
Daily Call Note: Daily call completed today. Patient was on her way to the drugstore to  glucometer. Patient stated she was not instructed in the hospital on how to use a glucometer or how to prick her finger. Patient did not weigh herself this morning. She was encourage of the importance of weighing her self daily. Patient stated she will check on a BP monitor when she arrives at the drug store to  her glucometer. Once patient has obtained glucometer, she will need a Medic to follow up how to use and how to prick her finger.    Pt Education: BS checks and daily weights   Barriers: patient hasn't picked up equipment  Topics for Daily Review: daily weights, glucose monitoring   Pt demonstrates clear understanding: Yes    Daily Weight:  There were no vitals filed for this visit.   Last 3 Weights:  Wt Readings from Last 7 Encounters:   06/21/24 111 kg (245 lb)   06/13/24 115 kg (252 lb 6.8 oz)   06/10/24 103 kg (227 lb 1.2 oz)   02/05/24 107 kg (235 lb)   10/30/23 110 kg (242 lb)   10/24/23 110 kg (242 lb 12.8 oz)   04/21/20 111 kg (245 lb)       Masimo Device: No   Masimo Clinical Impression:     Virtual Visits--Scheduled (Most Recent Date at Top)  Follow up Appointments  Recent Visits  No visits were found meeting these conditions.  Showing recent visits within past 30 days and meeting all other requirements  Future Appointments  Date Type Provider Dept   07/03/24 Appointment [facility]   Showing future appointments within next 90 days and meeting all other requirements       Frequency of RN Calls & Virtual Visits per Team Agreement: [facility] Frequency: Daily    Medication issues Addressed (what was done):     Follow up appointments scheduled by [facility] Staff:   Referrals made by [facility] staff:

## 2024-07-03 ENCOUNTER — TELEMEDICINE (OUTPATIENT)
Dept: CARE COORDINATION | Age: 69
End: 2024-07-03
Payer: MEDICARE

## 2024-07-03 ENCOUNTER — PATIENT OUTREACH (OUTPATIENT)
Dept: HOME HEALTH SERVICES | Age: 69
End: 2024-07-03

## 2024-07-03 ENCOUNTER — APPOINTMENT (OUTPATIENT)
Dept: CARDIOLOGY | Facility: CLINIC | Age: 69
End: 2024-07-03
Payer: MEDICARE

## 2024-07-03 VITALS
WEIGHT: 235 LBS | DIASTOLIC BLOOD PRESSURE: 82 MMHG | HEART RATE: 72 BPM | BODY MASS INDEX: 39.15 KG/M2 | HEIGHT: 65 IN | SYSTOLIC BLOOD PRESSURE: 128 MMHG

## 2024-07-03 VITALS
SYSTOLIC BLOOD PRESSURE: 128 MMHG | HEART RATE: 72 BPM | WEIGHT: 235 LBS | DIASTOLIC BLOOD PRESSURE: 82 MMHG | BODY MASS INDEX: 39.11 KG/M2

## 2024-07-03 DIAGNOSIS — I25.5 ISCHEMIC CARDIOMYOPATHY: ICD-10-CM

## 2024-07-03 DIAGNOSIS — R06.09 DOE (DYSPNEA ON EXERTION): ICD-10-CM

## 2024-07-03 DIAGNOSIS — Z78.9 NEVER SMOKED TOBACCO: ICD-10-CM

## 2024-07-03 DIAGNOSIS — I10 ESSENTIAL HYPERTENSION: ICD-10-CM

## 2024-07-03 DIAGNOSIS — R55 SYNCOPE AND COLLAPSE: ICD-10-CM

## 2024-07-03 DIAGNOSIS — I50.20 HFREF (HEART FAILURE WITH REDUCED EJECTION FRACTION) (MULTI): Primary | ICD-10-CM

## 2024-07-03 DIAGNOSIS — E78.2 MIXED HYPERLIPIDEMIA: ICD-10-CM

## 2024-07-03 PROCEDURE — 1036F TOBACCO NON-USER: CPT | Performed by: INTERNAL MEDICINE

## 2024-07-03 PROCEDURE — 3008F BODY MASS INDEX DOCD: CPT | Performed by: INTERNAL MEDICINE

## 2024-07-03 PROCEDURE — 3074F SYST BP LT 130 MM HG: CPT | Performed by: INTERNAL MEDICINE

## 2024-07-03 PROCEDURE — 1159F MED LIST DOCD IN RCRD: CPT | Performed by: INTERNAL MEDICINE

## 2024-07-03 PROCEDURE — 99214 OFFICE O/P EST MOD 30 MIN: CPT | Performed by: INTERNAL MEDICINE

## 2024-07-03 PROCEDURE — 1111F DSCHRG MED/CURRENT MED MERGE: CPT | Performed by: INTERNAL MEDICINE

## 2024-07-03 PROCEDURE — 3079F DIAST BP 80-89 MM HG: CPT | Performed by: INTERNAL MEDICINE

## 2024-07-03 NOTE — PROGRESS NOTES
CARDIOLOGY OFFICE VISIT      CHIEF COMPLAINT  Chief Complaint   Patient presents with   • Hospital Follow-up     Follow up due to CHF, pt had LHC as well        HISTORY OF PRESENT ILLNESS  Myrna Calhoun is a 69 y.o. year old female patient with history of bilateral breast cancer s/p lumpectomy and radiation therapy as well as chemotherapy, hypertension dyslipidemia and diabetes mellitus who had an episode of sudden unresponsiveness in her driveway when she got out of her car.  She collapsed and was found unresponsive, given Narcan with no response and she was eventually intubated.  She had a quick extubation and was noted to have a significantly low potassium of 2.5 on her initial lab.  She denied any recreational drug use.  Echo shows moderate LV dysfunction with EF of 45% for which she had cardiac catheterization on 6/12/2024 that showed normal coronaries with mildly reduced LV function.  She was subsequently started on GDMT including spironolactone and she now returns for follow-up.  She was noted to have frequent PVCs on the monitor during her hospitalization.  She says she has been doing well denying any chest pain or significant shortness of breath with her day-to-day activities.  Her BN P was mildly elevated at 144.    ASSESSMENT AND PLAN  1.  Mild nonischemic cardiomyopathy: Although etiology of unresponsiveness is unclear, the only abnormality was the LV dysfunction as noted above with normal coronaries.  It is unclear whether she had an arrhythmic event in the setting of significant hypokalemia and frequent PVCs on the monitor.  At this time, we will continue with current GDMT which the patient is tolerating and will get a repeat BMP and BNP today.  Will follow-up in about 3 months with a repeat echocardiogram.  2.  Hypertension: Blood pressure is well-controlled current medications which we will continue.  3.  Dyslipidemia: Will get a lipid profile prior to her next follow-up.  Problem List Items  Addressed This Visit       Essential hypertension    Mixed hyperlipidemia    Syncope and collapse    Ischemic cardiomyopathy    Never smoked tobacco    BMI 39.0-39.9,adult       Recent Cardiovascular Testing:    Echo-  Stress-  Cath-  Carotid Ultrasound-    Past Medical History  Past Medical History:   Diagnosis Date   • Adjustment disorder with depressed mood 10/23/2023   • Diabetes (Multi)    • Hyperlipidemia    • Hypertension    • Invasive ductal carcinoma of breast, female (Multi) 2016    Bilateral       Social History  Social History     Tobacco Use   • Smoking status: Never   • Smokeless tobacco: Never   Vaping Use   • Vaping status: Never Used   Substance Use Topics   • Alcohol use: Yes     Comment: holidays   • Drug use: Never       Family History     Family History   Problem Relation Name Age of Onset   • No Known Problems Mother     • Hypertension Sister          Allergies:  Allergies   Allergen Reactions   • Simvastatin Cough        Outpatient Medications:  Current Outpatient Medications   Medication Instructions   • aspirin 81 mg, oral, Daily   • blood pressure monitor kit Use as directed   • blood sugar diagnostic (Blood Glucose Test) strip Use to check sugars up to 4x daily   • blood-glucose meter misc Use as directed   • DULoxetine (CYMBALTA) 60 mg, oral, Daily   • ezetimibe (ZETIA) 10 mg, oral, Daily   • fluticasone (Flonase) 50 mcg/actuation nasal spray Administer 1 spray into each nostril once daily as needed for allergies.   • Jardiance 10 mg, oral, Daily   • lancets misc Use to check sugars up to 4x daily   • losartan (COZAAR) 25 mg, oral, Daily   • metFORMIN (GLUCOPHAGE) 500 mg, oral, 2 times daily, With morning and evening meals   • metoprolol succinate XL (TOPROL-XL) 50 mg, oral, Daily, Do not crush or chew.   • spironolactone (ALDACTONE) 12.5 mg, oral, Daily   • traZODone (Desyrel) 50 mg tablet Take 1 tablet (50 mg) by mouth as needed at bedtime.   • Vitamin D3 50 mcg (2,000 unit) capsule  Take 1 capsule (50 mcg) by mouth early in the morning.. With a meal        Recent Lab Results:    CBC:   Lab Results   Component Value Date    WBC 7.5 06/13/2024    RBC 3.42 (L) 06/13/2024    HGB 11.1 (L) 06/13/2024    HCT 35.1 (L) 06/13/2024     06/13/2024        CMP:    Lab Results   Component Value Date     06/13/2024    K 4.3 06/13/2024     (H) 06/13/2024    CO2 24 06/13/2024    BUN 19 06/13/2024    CREATININE 1.06 (H) 06/13/2024    GLUCOSE 90 06/13/2024    CALCIUM 8.6 06/13/2024       Magnesium:    Lab Results   Component Value Date    MG 2.19 06/13/2024       Lipid Profile:    Lab Results   Component Value Date    TRIG 309 (H) 06/11/2024    HDL 45.5 06/11/2024    LDLCALC 70 06/11/2024       TSH:    Lab Results   Component Value Date    TSH 2.61 06/11/2024       BNP:   Lab Results   Component Value Date     (H) 06/10/2024        PT/INR:    Lab Results   Component Value Date    PROTIME 11.7 06/10/2024    INR 1.0 06/10/2024       HgBA1c:    Lab Results   Component Value Date    HGBA1C 6.0 11/18/2020       BMP:  Lab Results   Component Value Date     06/13/2024     06/12/2024     06/12/2024    K 4.3 06/13/2024    K 4.1 06/12/2024    K 3.9 06/12/2024     (H) 06/13/2024     (H) 06/12/2024     06/12/2024    CO2 24 06/13/2024    CO2 24 06/12/2024    CO2 26 06/12/2024    BUN 19 06/13/2024    BUN 15 06/12/2024    BUN 18 06/12/2024    CREATININE 1.06 (H) 06/13/2024    CREATININE 1.03 06/12/2024    CREATININE 1.07 (H) 06/12/2024       CBC:  Lab Results   Component Value Date    WBC 7.5 06/13/2024    WBC 10.6 06/12/2024    WBC 7.3 06/11/2024    RBC 3.42 (L) 06/13/2024    RBC 3.49 (L) 06/12/2024    RBC 4.17 06/11/2024    HGB 11.1 (L) 06/13/2024    HGB 11.2 (L) 06/12/2024    HGB 13.5 06/11/2024    HCT 35.1 (L) 06/13/2024    HCT 34.6 (L) 06/12/2024    HCT 42.8 06/11/2024     (H) 06/13/2024    MCV 99 06/12/2024     (H) 06/11/2024    MCH 32.5 06/13/2024     MCH 32.1 06/12/2024    MCH 32.4 06/11/2024    MCHC 31.6 (L) 06/13/2024    MCHC 32.4 06/12/2024    MCHC 31.5 (L) 06/11/2024    RDW 14.1 06/13/2024    RDW 14.1 06/12/2024    RDW 13.9 06/11/2024     06/13/2024     06/12/2024     06/11/2024       Cardiac Enzymes:    Lab Results   Component Value Date    TROPHS 8 06/10/2024    TROPHS 8 06/10/2024    TROPHS 7 02/06/2024       Hepatic Function Panel:    Lab Results   Component Value Date    ALKPHOS 40 06/13/2024    ALT 40 06/13/2024    AST 34 06/13/2024    PROT 6.3 (L) 06/13/2024    BILITOT 1.0 06/13/2024    BILIDIR 0.1 11/21/2021         REVIEW OF SYSTEMS  ROS    VITALS  Vitals:    07/03/24 0924   BP: 128/82   Pulse: 72     Wt Readings from Last 4 Encounters:   07/03/24 107 kg (235 lb)   06/21/24 111 kg (245 lb)   06/13/24 115 kg (252 lb 6.8 oz)   06/10/24 103 kg (227 lb 1.2 oz)       PHYSICAL EXAM  Physical Exam

## 2024-07-03 NOTE — PROGRESS NOTES
Brief summary of hospitalization or reason for referral  Admission Dx and Associated Referral Dx: 69-year-old female with past medical history of bilateral IDC status post bilateral lumpectomy with SLNB and XRT treated with anastrozole, obesity, hypertension, hyperlipidemia, Prediabetes mellitus type 2 and osteoarthritis who was admitted for being found down. Work up was neg. Pt is to suppose to get holter as outpt. She is dc with referral to [facility] for PreDM, HFrEF EF 45%.     Interval Subjective:       Patient is doing well. Symptoms are stable, with no new symptoms reported.  . /82.   No recent admissions or urgent care visit since the last conference call.     General well-being and mood are stable. No active pain. Sleep and appetite are good. No new difficulties with daily activities or social issues noted.     Patient reports compliance with medications, no issues or need for refills. Future appointments discussed, and all questions answered.     Masimo: No  Oxygen: No     CPAP/BIPAP: No    Wt Readings from Last 3 Encounters:   07/03/24 107 kg (235 lb)   06/21/24 111 kg (245 lb)   06/13/24 115 kg (252 lb 6.8 oz)       Medications Issues/Refill need  Medication Follow up action needed: na    Requested Prescriptions      No prescriptions requested or ordered in this encounter       Upcoming Visits:  Recent Visits  No visits were found meeting these conditions.  Showing recent visits within past 30 days and meeting all other requirements  Today's Visits  Date Type Provider Dept   07/03/24 Appointment [facility]   Showing today's visits and meeting all other requirements  Future Appointments  No visits were found meeting these conditions.  Showing future appointments within next 90 days and meeting all other requirements      Interval or Pertinent Labs/Testing:  Lab Review:   Hemoglobin A1C   Date/Time Value Ref Range Status   11/18/2020 02:40 PM 6.0 % Final     Comment:           Diagnosis of Diabetes-Adults   Non-Diabetic: < or = 5.6%   Increased risk for developing diabetes: 5.7-6.4%   Diagnostic of diabetes: > or = 6.5%  .       Monitoring of Diabetes                Age (y)     Therapeutic Goal (%)   Adults:          >18           <7.0   Pediatrics:    13-18           <7.5                   7-12           <8.0                   0- 6            7.5-8.5   American Diabetes Association. Diabetes Care 33(S1), Jan 2010.         Lab Results   Component Value Date     06/13/2024    K 4.3 06/13/2024     (H) 06/13/2024    CO2 24 06/13/2024    BUN 19 06/13/2024    CREATININE 1.06 (H) 06/13/2024    GLUCOSE 90 06/13/2024    CALCIUM 8.6 06/13/2024     Lab Results   Component Value Date    WBC 7.5 06/13/2024    HGB 11.1 (L) 06/13/2024    HCT 35.1 (L) 06/13/2024     (H) 06/13/2024     06/13/2024        SDOH Concerns & Interventions: na    Assessment/Plan    1.HFrEF (EF 45%)  - continue GDMT:  losartan, metoprolol, jardiance, spirono  - encourage to get daily weights (has scale but not recording)  - Saw cards today with no changes on meds    2.DM (A1c=6.0)  - metformin + jardiance  - glucometer was sent to home but has not received yet       Conference call with RN.

## 2024-07-03 NOTE — PROGRESS NOTES
Daily Call Note: Weekly [facility] complete with Dr. Maldonado. Patient states she is doing good. She had cardiology appointment today and states her BP is good and her weight is down. No medication changes today. She states she was unable to  glucometer at pharmacy because her insurance wouldn't cover due to she needs to check her blood sugar qid. She asked if she could or should get a CGM. Secure chat to Anibal Cruz PharmD to update and for any suggestions. Decreased daily call frequency to M/W/F and next [facility]  scheduled 7/10/24 at 1730, verbalized understanding. No other questions at this time.     /82   Pulse 72   Wt 107 kg (235 lb)   BMI 39.11 kg/m²        Pt Education: per POC  Barriers: none  Topics for Daily Review: BP, weight  Pt demonstrates clear understanding: Yes    Daily Weight:  There were no vitals filed for this visit.   Last 3 Weights:  Wt Readings from Last 7 Encounters:   07/03/24 107 kg (235 lb)   06/21/24 111 kg (245 lb)   06/13/24 115 kg (252 lb 6.8 oz)   06/10/24 103 kg (227 lb 1.2 oz)   02/05/24 107 kg (235 lb)   10/30/23 110 kg (242 lb)   10/24/23 110 kg (242 lb 12.8 oz)       Masimo Device: No   Masimo Clinical Impression: n/a    Virtual Visits--Scheduled (Most Recent Date at Top)  Follow up Appointments  Recent Visits  No visits were found meeting these conditions.  Showing recent visits within past 30 days and meeting all other requirements  Future Appointments  No visits were found meeting these conditions.  Showing future appointments within next 90 days and meeting all other requirements       Frequency of RN Calls & Virtual Visits per Team Agreement: [facility] Frequency: M/W/F    Medication issues Addressed (what was done): none    Follow up appointments scheduled by [facility] Staff: none  Referrals made by [facility] staff: none

## 2024-07-08 ENCOUNTER — PATIENT OUTREACH (OUTPATIENT)
Dept: HOME HEALTH SERVICES | Age: 69
End: 2024-07-08
Payer: MEDICARE

## 2024-07-09 ENCOUNTER — PHARMACY VISIT (OUTPATIENT)
Dept: PHARMACY | Facility: CLINIC | Age: 69
End: 2024-07-09
Payer: COMMERCIAL

## 2024-07-09 PROCEDURE — RXMED WILLOW AMBULATORY MEDICATION CHARGE

## 2024-07-10 ENCOUNTER — TELEMEDICINE (OUTPATIENT)
Dept: PHARMACY | Facility: HOSPITAL | Age: 69
End: 2024-07-10
Payer: MEDICARE

## 2024-07-10 ENCOUNTER — APPOINTMENT (OUTPATIENT)
Dept: CARE COORDINATION | Age: 69
End: 2024-07-10
Payer: MEDICARE

## 2024-07-10 ENCOUNTER — PATIENT OUTREACH (OUTPATIENT)
Dept: HOME HEALTH SERVICES | Age: 69
End: 2024-07-10

## 2024-07-10 DIAGNOSIS — I50.31 ACUTE DIASTOLIC CONGESTIVE HEART FAILURE (MULTI): Primary | ICD-10-CM

## 2024-07-10 DIAGNOSIS — E11.9 TYPE 2 DIABETES MELLITUS WITHOUT COMPLICATION, WITHOUT LONG-TERM CURRENT USE OF INSULIN (MULTI): ICD-10-CM

## 2024-07-10 NOTE — PROGRESS NOTES
Daily Call Note:     [facility] call completed with the patient, Anibal Llanes, and the Nurse.  She states she is doing good, and getting better each day.  Her weight was 232 lbs.  She states she has been cutting back on soda, eating fresh fruits, and trying to cut back on starches.  She states she had an issue with getting [facility]  Anibal advised to try again.  If she still has an issue Anibal will have it sent to [facility] or mail out to the patient.  She will let us know.  She doesn't have a BP cuff and Kristin states her insurance will not cover it.  Advised that she can go on [employer] and purchase an inexpensive cuff.  Pt had no add'l questions or concerns.  Next [facility] 7/17 @ 1830.        Pt Education:   Barriers:   Topics for Daily Review:   Pt demonstrates clear understanding:     Daily Weight:  There were no vitals filed for this visit.   Last 3 Weights:  Wt Readings from Last 7 Encounters:   07/03/24 107 kg (235 lb)   07/03/24 107 kg (235 lb)   06/21/24 111 kg (245 lb)   06/13/24 115 kg (252 lb 6.8 oz)   06/10/24 103 kg (227 lb 1.2 oz)   02/05/24 107 kg (235 lb)   10/30/23 110 kg (242 lb)       Masimo Device:    Masimo Clinical Impression:     Virtual Visits--Scheduled (Most Recent Date at Top)  Follow up Appointments  Recent Visits  No visits were found meeting these conditions.  Showing recent visits within past 30 days and meeting all other requirements  Future Appointments  No visits were found meeting these conditions.  Showing future appointments within next 90 days and meeting all other requirements       Frequency of RN Calls & Virtual Visits per Team Agreement:     Medication issues Addressed (what was done):     Follow up appointments scheduled by [facility] Staff:   Referrals made by [facility] staff:

## 2024-07-12 ENCOUNTER — PATIENT OUTREACH (OUTPATIENT)
Dept: HOME HEALTH SERVICES | Age: 69
End: 2024-07-12
Payer: MEDICARE

## 2024-07-12 NOTE — PROGRESS NOTES
Daily Call Note: [facility] daily call complete.  Pt reports she is feeling well.  Denies CP/SOB.  Pt has not yet picked up glucometer.  Pt states she will go today.    Verified upcoming weekly [facility] call.      Pt Education:  POC   Barriers:   Topics for Daily Review:   Pt demonstrates clear understanding: Yes    Daily Weight:  There were no vitals filed for this visit.   Last 3 Weights:  Wt Readings from Last 7 Encounters:   07/03/24 107 kg (235 lb)   07/03/24 107 kg (235 lb)   06/21/24 111 kg (245 lb)   06/13/24 115 kg (252 lb 6.8 oz)   06/10/24 103 kg (227 lb 1.2 oz)   02/05/24 107 kg (235 lb)   10/30/23 110 kg (242 lb)       Masimo Device: No   Masimo Clinical Impression:     Virtual Visits--Scheduled (Most Recent Date at Top)  Follow up Appointments  Recent Visits  No visits were found meeting these conditions.  Showing recent visits within past 30 days and meeting all other requirements  Future Appointments  No visits were found meeting these conditions.  Showing future appointments within next 90 days and meeting all other requirements       Frequency of RN Calls & Virtual Visits per Team Agreement: [facility] Frequency: Bi-Weekly    Medication issues Addressed (what was done):     Follow up appointments scheduled by [facility] Staff:   Referrals made by [facility] staff:

## 2024-07-13 ENCOUNTER — DOCUMENTATION (OUTPATIENT)
Dept: HOME HEALTH SERVICES | Age: 69
End: 2024-07-13
Payer: MEDICARE

## 2024-07-13 DIAGNOSIS — E11.9 TYPE 2 DIABETES MELLITUS WITHOUT COMPLICATION, WITHOUT LONG-TERM CURRENT USE OF INSULIN (MULTI): ICD-10-CM

## 2024-07-13 RX ORDER — IBUPROFEN 200 MG
CAPSULE ORAL
Qty: 100 EACH | Refills: 2 | Status: SHIPPED | OUTPATIENT
Start: 2024-07-13

## 2024-07-13 RX ORDER — LANCETS 33 GAUGE
EACH MISCELLANEOUS
Refills: 11 | OUTPATIENT
Start: 2024-07-13

## 2024-07-13 RX ORDER — LANCETS
EACH MISCELLANEOUS
Qty: 100 EACH | Refills: 2 | Status: SHIPPED | OUTPATIENT
Start: 2024-07-13

## 2024-07-13 RX ORDER — BLOOD SUGAR DIAGNOSTIC
STRIP MISCELLANEOUS
Qty: 100 STRIP | Refills: 11 | OUTPATIENT
Start: 2024-07-13

## 2024-07-13 NOTE — PROGRESS NOTES
Received call from pt that she is at [facility] to  glucometer and testing supplies. Pt states that insurance wont cover strips and lancets due to script stating to test four times daily. Due to pt being on medicaid and not on insulin pt may only test once a day in order for supplies to be covered by insurance. Debbi Cabrera made aware with request to change script.     Patient's Address:   [address] [place] [address]  **  If this is not the address patient will receive services - alert team and address in EMR**       Patient Contacts:  Extended Emergency Contact Information  Primary Emergency Contact: Jacinta Jones  Mobile Phone: [phone]  Relation: Relative  Preferred language: English   needed? No  Secondary Emergency Contact: January Gray  [place] Phone: [phone]  Relation: Sibling                                Patient's Preferred Phone: [phone]  Patient's E-mail: No e-mail address on record

## 2024-07-15 NOTE — PROGRESS NOTES
Daily Call Note: Incoming call from patient after missed call from nurse. Patient did get glucometer, blood sugar today 125. She got a BP cuff but thinks it's to small and keeps saying error, she is going to take it back and order one from [employer]. Next [facility] 7/17/24 at 1830 for potential graduation, verbalized understanding. No other questions at this time.     Pt Education: per POC  Barriers: none  Topics for Daily Review: blood sugar  Pt demonstrates clear understanding: Yes    Daily Weight:  There were no vitals filed for this visit.   Last 3 Weights:  Wt Readings from Last 7 Encounters:   07/03/24 107 kg (235 lb)   07/03/24 107 kg (235 lb)   06/21/24 111 kg (245 lb)   06/13/24 115 kg (252 lb 6.8 oz)   06/10/24 103 kg (227 lb 1.2 oz)   02/05/24 107 kg (235 lb)   10/30/23 110 kg (242 lb)       Masimo Device: No   Masimo Clinical Impression: n/a    Virtual Visits--Scheduled (Most Recent Date at Top)  Follow up Appointments  Recent Visits  No visits were found meeting these conditions.  Showing recent visits within past 30 days and meeting all other requirements  Future Appointments  No visits were found meeting these conditions.  Showing future appointments within next 90 days and meeting all other requirements       Frequency of RN Calls & Virtual Visits per Team Agreement: [facility] Frequency: M/W/F    Medication issues Addressed (what was done): none    Follow up appointments scheduled by [facility] Staff: none  Referrals made by [facility] staff: none

## 2024-07-17 ENCOUNTER — APPOINTMENT (OUTPATIENT)
Dept: PHARMACY | Facility: HOSPITAL | Age: 69
End: 2024-07-17
Payer: MEDICARE

## 2024-07-17 ENCOUNTER — APPOINTMENT (OUTPATIENT)
Dept: CARE COORDINATION | Age: 69
End: 2024-07-17
Payer: MEDICARE

## 2024-07-17 ENCOUNTER — PATIENT OUTREACH (OUTPATIENT)
Dept: CARE COORDINATION | Age: 69
End: 2024-07-17

## 2024-07-17 DIAGNOSIS — E11.9 TYPE 2 DIABETES MELLITUS WITHOUT COMPLICATION, WITHOUT LONG-TERM CURRENT USE OF INSULIN (MULTI): ICD-10-CM

## 2024-07-17 DIAGNOSIS — I10 ESSENTIAL HYPERTENSION: Primary | ICD-10-CM

## 2024-07-17 DIAGNOSIS — I50.31 ACUTE DIASTOLIC CONGESTIVE HEART FAILURE (MULTI): Primary | ICD-10-CM

## 2024-07-17 DIAGNOSIS — I50.20 HFREF (HEART FAILURE WITH REDUCED EJECTION FRACTION) (MULTI): ICD-10-CM

## 2024-07-17 RX ORDER — METFORMIN HYDROCHLORIDE 500 MG/1
500 TABLET ORAL 2 TIMES DAILY
Qty: 60 TABLET | Refills: 2 | Status: SHIPPED | OUTPATIENT
Start: 2024-07-17

## 2024-07-17 NOTE — PROGRESS NOTES
Pharmacy Post-Discharge Visit - Follow Up     Myrna Calhoun is a 69 y.o. female was referred to Clinical Pharmacy Team to complete a post-discharge medication optimization and monitoring visit.  The patient was referred for their CHF and diabetes management while also enrolled in [facility].     Referring Provider: Gabbie Morillo M*  PCP: Valorie Burdick MD - not with        Subjective   Allergies   Allergen Reactions    Simvastatin Cough       [id number] [place], [place] - [address]  [address] [address]  Phone: [phone] Fax: [phone]    [facility]  [address]  [facility] [address]  Phone: [phone] Fax: [phone]      Medication System Management:  Affordability/Accessibility: no concerns   Adherence/Organization: no issues       Social History     Social History Narrative    Not on file          HPI  CHF ASSESSMENT  Staging:  Most recent ejection fraction: 45%  NYHA Stage: II  ACC/AHA Stage: C     Symptom Assessment:  Weight changes/edema?: No  Dyspnea?: None  Dizziness/syncope/palpitations?: No     Current Regimen:  ARNI/ACEi/ARB: Yes - losartan  Beta Blocker: Yes - metoprolol  MRA: Yes - spironolactone   SGLT2i: Yes - jardiance      Other therapy:  Zetia      Secondary Prevention:  The 10-year ASCVD risk score (Daniel ARREDONDO, et al., 2019) is: 12.2%    Values used to calculate the score:      Age: 69 years      Sex: Female      Is Non- : Yes      Diabetic: No      Tobacco smoker: No      Systolic Blood Pressure: 141 mmHg      Is BP treated: Yes      HDL Cholesterol: 45.5 mg/dL      Total Cholesterol: 177 mg/dL     Aspirin 81mg? yes  Statin?: No  HTN?: No     Review of Systems        Objective     There were no vitals taken for this visit.   BP Readings from Last 4 Encounters:   07/03/24 128/82   07/03/24 128/82   06/13/24 141/65   06/10/24 (!) 148/99      There were no vitals filed for  this visit.     LAB  Lab Results   Component Value Date    BILITOT 1.0 06/13/2024    CALCIUM 8.6 06/13/2024    CO2 24 06/13/2024     (H) 06/13/2024    CREATININE 1.06 (H) 06/13/2024    GLUCOSE 90 06/13/2024    ALKPHOS 40 06/13/2024    K 4.3 06/13/2024    PROT 6.3 (L) 06/13/2024     06/13/2024    AST 34 06/13/2024    ALT 40 06/13/2024    BUN 19 06/13/2024    ANIONGAP 11 06/13/2024    MG 2.19 06/13/2024    PHOS 2.1 (L) 06/12/2024    ALBUMIN 3.4 06/13/2024    LIPASE 27 02/05/2024    GFRF 73 06/21/2023     Lab Results   Component Value Date    TRIG 309 (H) 06/11/2024    CHOL 177 06/11/2024    LDLCALC 70 06/11/2024    HDL 45.5 06/11/2024     Lab Results   Component Value Date    HGBA1C 6.0 11/18/2020         Current Outpatient Medications   Medication Instructions    aspirin 81 mg, oral, Daily    blood pressure monitor kit Use as directed    blood sugar diagnostic (Blood Glucose Test) strip Use to check blood sugar once daily    blood-glucose meter misc Use as directed    DULoxetine (CYMBALTA) 60 mg, oral, Daily    ezetimibe (ZETIA) 10 mg, oral, Daily    fluticasone (Flonase) 50 mcg/actuation nasal spray Administer 1 spray into each nostril once daily as needed for allergies.    Jardiance 10 mg, oral, Daily    lancets misc Use to check blood sugar once daily    losartan (COZAAR) 25 mg, oral, Daily    metFORMIN (GLUCOPHAGE) 500 mg, oral, 2 times daily, With morning and evening meals    metoprolol succinate XL (TOPROL-XL) 50 mg, oral, Daily, Do not crush or chew.    spironolactone (ALDACTONE) 12.5 mg, oral, Daily    traZODone (Desyrel) 50 mg tablet Take 1 tablet (50 mg) by mouth as needed at bedtime.    Vitamin D3 50 mcg (2,000 unit) capsule Take 1 capsule (50 mcg) by mouth early in the morning.. With a meal            Assessment/Plan   Problem List Items Addressed This Visit    None    CHF  Most recent EF was 45% done while admitted   Current GDMT --> metoprolol, losartan, jardiance and spironolactone   No  diuretic   More compliant with daily weights but still no BP cuff   Tried going to the store to obtain one but it did not fit so she returned and her friend is going to help with ordering her one from [employer]  Pre-DM  Most recent A1c 6% but done in 2020   Currently taking metformin 500mg BID   She was finally able to  new meter and supplies over the weekend and has been checking  Continue with metformin and jardiance     Follow Up: as needed     Continue all meds under the continuation of care with the referring provider and clinical pharmacy team.    Anibal Cruz, PharmD     Verbal consent to manage patient's drug therapy was obtained from the patient. They were informed they may decline to participate or withdraw from participation in pharmacy services at any time.

## 2024-07-17 NOTE — PROGRESS NOTES
Daily Call Note: Last [facility] today with Dr. Han and Lupe. Patient states he is doing well. She was unable to report her blood sugars to the team this evening because she did not write them down. She stated they were high, they were normal. She had take the BP monitor back to the store due to the cuff was too small. Patient states her friend will order her another cuff to fit from [employer]. Patient stated she obtained her test strips and lancets. Patient was made aware she will graduate from the program today, she is in agreement.  Patient will call [facility] if she has any questions or concerns.     Pt Education:   Barriers:   Topics for Daily Review:   Pt demonstrates clear understanding:     Daily Weight:  There were no vitals filed for this visit.   Last 3 Weights:  Wt Readings from Last 7 Encounters:   07/03/24 107 kg (235 lb)   07/03/24 107 kg (235 lb)   06/21/24 111 kg (245 lb)   06/13/24 115 kg (252 lb 6.8 oz)   06/10/24 103 kg (227 lb 1.2 oz)   02/05/24 107 kg (235 lb)   10/30/23 110 kg (242 lb)       Masimo Device:    Masimo Clinical Impression:     Virtual Visits--Scheduled (Most Recent Date at Top)  Follow up Appointments  Recent Visits  Date Type Provider Dept   07/03/24 Telemedicine Cipriano Maldonado MD [facility]   Showing recent visits within past 30 days and meeting all other requirements  Future Appointments  No visits were found meeting these conditions.  Showing future appointments within next 90 days and meeting all other requirements       Frequency of RN Calls & Virtual Visits per Team Agreement:     Medication issues Addressed (what was done):     Follow up appointments scheduled by [facility] Staff:   Referrals made by [facility] staff:

## 2024-07-17 NOTE — PROGRESS NOTES
Brief summary of hospitalization or reason for referral  Admission Dx and Associated Referral Dx:     69-year-old female with past medical history of bilateral IDC status post bilateral lumpectomy with SLNB and XRT treated with anastrozole, obesity, hypertension, hyperlipidemia, Prediabetes mellitus type 2 and osteoarthritis who was admitted for being found down. Work up was neg. Pt is to suppose to get holter as outpt. She is dc with referral to [facility] for PreDM, HFrEF EF 45%.     Interval Subjective:   Reports to doing well, denied any complaints.  Is currently at work, does not have her journal with her.  States her numbers have been within normal limits.  Was able to get her glucometer, testing supplies through the pharmacy.  Unable to check her blood pressure as the cuff size was too small, waiting to get a bigger cuff through [employer].  Will schedule PCP appointment next week.    Masimo: No  Oxygen: No     CPAP/BIPAP: No    Wt Readings from Last 3 Encounters:   07/03/24 107 kg (235 lb)   07/03/24 107 kg (235 lb)   06/21/24 111 kg (245 lb)   7/10: 232lbs     Medications Issues/Refill need  Medication Follow up action needed: None    Requested Prescriptions      No prescriptions requested or ordered in this encounter       Upcoming Visits:  Recent Visits  Date Type Provider Dept   07/03/24 Telemedicine Cipriano Maldonado MD [facility]   Showing recent visits within past 30 days and meeting all other requirements  Future Appointments  No visits were found meeting these conditions.  Showing future appointments within next 90 days and meeting all other requirements      Interval or Pertinent Labs/Testing:  Lab Review:   Hemoglobin A1C   Date/Time Value Ref Range Status   11/18/2020 02:40 PM 6.0 % Final     Comment:          Diagnosis of Diabetes-Adults   Non-Diabetic: < or = 5.6%   Increased risk for developing diabetes: 5.7-6.4%   Diagnostic of diabetes: > or = 6.5%  .       Monitoring of Diabetes                 Age (y)     Therapeutic Goal (%)   Adults:          >18           <7.0   Pediatrics:    13-18           <7.5                   7-12           <8.0                   0- 6            7.5-8.5   American Diabetes Association. Diabetes Care 33(S1), Jan 2010.         not applicable     SDOH Concerns & Interventions: None    Assessment/Plan    1.HFrEF (EF 45%)  - continue GDMT:  losartan, metoprolol, jardiance, spironolactone  No concerns taking her medication.  Denied any edema or shortness of breath.    Advised to continue checking her weight and blood pressure    2.DM (A1c=6.0)  - metformin + jardiance  Received her glucometer and testing supplies from pharmacy.  Unable to remember her numbers, journal left at home.    Next cardiology appointment in October 2024, will schedule PCP appointment next week.  Discussed graduating from the program, felt comfortable, advised to call [facility] nurse line with any questions or concerns.  Graduated from the program today.    Call completed with patient, RN and pharmacist.

## 2024-07-18 PROCEDURE — RXMED WILLOW AMBULATORY MEDICATION CHARGE

## 2024-07-30 ENCOUNTER — PHARMACY VISIT (OUTPATIENT)
Dept: PHARMACY | Facility: CLINIC | Age: 69
End: 2024-07-30
Payer: COMMERCIAL

## 2024-07-30 ENCOUNTER — PATIENT OUTREACH (OUTPATIENT)
Dept: CARDIOLOGY | Facility: CLINIC | Age: 69
End: 2024-07-30
Payer: MEDICARE

## 2024-08-28 ENCOUNTER — PATIENT OUTREACH (OUTPATIENT)
Dept: CARDIOLOGY | Facility: CLINIC | Age: 69
End: 2024-08-28
Payer: MEDICARE

## 2024-08-29 ENCOUNTER — HOSPITAL ENCOUNTER (OUTPATIENT)
Dept: RADIOLOGY | Facility: HOSPITAL | Age: 69
Discharge: HOME | End: 2024-08-29
Payer: MEDICARE

## 2024-08-29 VITALS — WEIGHT: 235 LBS | HEIGHT: 65 IN | BODY MASS INDEX: 39.15 KG/M2

## 2024-08-29 DIAGNOSIS — Z12.31 ENCOUNTER FOR SCREENING MAMMOGRAM FOR MALIGNANT NEOPLASM OF BREAST: ICD-10-CM

## 2024-08-29 PROCEDURE — 77067 SCR MAMMO BI INCL CAD: CPT | Performed by: RADIOLOGY

## 2024-08-29 PROCEDURE — 77063 BREAST TOMOSYNTHESIS BI: CPT | Performed by: RADIOLOGY

## 2024-08-29 PROCEDURE — 77067 SCR MAMMO BI INCL CAD: CPT

## 2024-10-07 ENCOUNTER — APPOINTMENT (OUTPATIENT)
Dept: CARDIOLOGY | Facility: CLINIC | Age: 69
End: 2024-10-07
Payer: MEDICARE

## 2024-10-07 ENCOUNTER — LAB (OUTPATIENT)
Dept: LAB | Facility: LAB | Age: 69
End: 2024-10-07
Payer: MEDICARE

## 2024-10-07 VITALS
BODY MASS INDEX: 38.72 KG/M2 | SYSTOLIC BLOOD PRESSURE: 138 MMHG | WEIGHT: 232.4 LBS | DIASTOLIC BLOOD PRESSURE: 88 MMHG | HEIGHT: 65 IN | HEART RATE: 76 BPM

## 2024-10-07 DIAGNOSIS — E78.2 MIXED HYPERLIPIDEMIA: ICD-10-CM

## 2024-10-07 DIAGNOSIS — I25.5 ISCHEMIC CARDIOMYOPATHY: ICD-10-CM

## 2024-10-07 DIAGNOSIS — R55 SYNCOPE AND COLLAPSE: ICD-10-CM

## 2024-10-07 DIAGNOSIS — Z78.9 NEVER SMOKED TOBACCO: ICD-10-CM

## 2024-10-07 DIAGNOSIS — I50.20 HFREF (HEART FAILURE WITH REDUCED EJECTION FRACTION): ICD-10-CM

## 2024-10-07 DIAGNOSIS — R06.09 DOE (DYSPNEA ON EXERTION): ICD-10-CM

## 2024-10-07 DIAGNOSIS — I10 ESSENTIAL HYPERTENSION: ICD-10-CM

## 2024-10-07 LAB
ANION GAP SERPL CALC-SCNC: 9 MMOL/L (ref 10–20)
BNP SERPL-MCNC: 80 PG/ML (ref 0–99)
BUN SERPL-MCNC: 21 MG/DL (ref 6–23)
CALCIUM SERPL-MCNC: 9.6 MG/DL (ref 8.6–10.3)
CHLORIDE SERPL-SCNC: 107 MMOL/L (ref 98–107)
CO2 SERPL-SCNC: 29 MMOL/L (ref 21–32)
CREAT SERPL-MCNC: 1.08 MG/DL (ref 0.5–1.05)
EGFRCR SERPLBLD CKD-EPI 2021: 56 ML/MIN/1.73M*2
GLUCOSE SERPL-MCNC: 114 MG/DL (ref 74–99)
POTASSIUM SERPL-SCNC: 4.2 MMOL/L (ref 3.5–5.3)
SODIUM SERPL-SCNC: 141 MMOL/L (ref 136–145)

## 2024-10-07 PROCEDURE — 83880 ASSAY OF NATRIURETIC PEPTIDE: CPT

## 2024-10-07 PROCEDURE — 3075F SYST BP GE 130 - 139MM HG: CPT | Performed by: INTERNAL MEDICINE

## 2024-10-07 PROCEDURE — 99214 OFFICE O/P EST MOD 30 MIN: CPT | Performed by: INTERNAL MEDICINE

## 2024-10-07 PROCEDURE — 80048 BASIC METABOLIC PNL TOTAL CA: CPT

## 2024-10-07 PROCEDURE — 3079F DIAST BP 80-89 MM HG: CPT | Performed by: INTERNAL MEDICINE

## 2024-10-07 PROCEDURE — 1159F MED LIST DOCD IN RCRD: CPT | Performed by: INTERNAL MEDICINE

## 2024-10-07 PROCEDURE — 1036F TOBACCO NON-USER: CPT | Performed by: INTERNAL MEDICINE

## 2024-10-07 PROCEDURE — 3008F BODY MASS INDEX DOCD: CPT | Performed by: INTERNAL MEDICINE

## 2024-10-07 ASSESSMENT — ENCOUNTER SYMPTOMS
NEUROLOGICAL NEGATIVE: 1
RESPIRATORY NEGATIVE: 1
CARDIOVASCULAR NEGATIVE: 1
CONSTITUTIONAL NEGATIVE: 1

## 2024-10-07 NOTE — PATIENT INSTRUCTIONS
Continue same medications and treatments.   Patient educated on proper medication use.   Patient educated on risk factor modification.   Please bring any lab results from other providers / physicians to your next appointment.     Please bring all medicines, vitamins, and herbal supplements with you when you come to the office.     Prescriptions will not be filled unless you are compliant with your follow up appointments or have a follow up appointment scheduled as per instruction of your physician. Refills should be requested at the time of your visit.  BMP and BNP today orders in chart  Echo 6 month  6 month visit

## 2024-10-07 NOTE — PROGRESS NOTES
CARDIOLOGY OFFICE VISIT      CHIEF COMPLAINT  Chief Complaint   Patient presents with    Follow-up     3 month follow up        HISTORY OF PRESENT ILLNESS  Myrna Calhoun is a 69 y.o. year old female patient history of bilateral breast cancer s/p lumpectomy and radiation therapy as well as chemotherapy, hypertension dyslipidemia and diabetes who had an episode of sudden unresponsiveness when she collapsed on her driveway with no response to Narcan.  She was noted to have a significantly low potassium of 2.5 on initial labs.  She had a cardiac catheterization in June 2024 that showed normal coronaries with mildly reduced LV function.  She was treated for nonischemic cardiomyopathy she states she has been doing well with no chest pain or significant shortness of breath with her day-to-day activities.  She also denied orthopnea or paroxysmal nocturnal dyspnea.    ASSESSMENT AND PLAN  1.  Mild nonischemic cardiomyopathy: It is unclear whether syncope was an arrhythmic event, but patient is doing well with NYHA class I so we will continue current GDMT.  Will repeat metabolic profile and BNP as previously requested.  2.  Hypertension: Blood pressure is well-controlled on current medications which we will continue.  3.  Dyslipidemia: We will get lipids profile prior to next follow-up.    Problem List Items Addressed This Visit       Mixed hyperlipidemia    Syncope and collapse    HFrEF (heart failure with reduced ejection fraction)    Ischemic cardiomyopathy    Never smoked tobacco    BMI 38.0-38.9,adult       Recent Cardiovascular Testing:    Echo-  Stress-  Cath-  Carotid Ultrasound-    Past Medical History  Past Medical History:   Diagnosis Date    Adjustment disorder with depressed mood 10/23/2023    Diabetes (Multi)     Hyperlipidemia     Hypertension     Invasive ductal carcinoma of breast, female 2016    Bilateral    Personal history of irradiation        Social History  Social History     Tobacco Use    Smoking  status: Never    Smokeless tobacco: Never   Vaping Use    Vaping status: Never Used   Substance Use Topics    Alcohol use: Yes     Comment: holidays    Drug use: Never       Family History     Family History   Problem Relation Name Age of Onset    No Known Problems Mother      Hypertension Sister          Allergies:  Allergies   Allergen Reactions    Simvastatin Cough        Outpatient Medications:  Current Outpatient Medications   Medication Instructions    blood pressure monitor kit Use as directed    blood sugar diagnostic (Blood Glucose Test) strip Use to check blood sugar once daily    blood-glucose meter misc Use as directed    DULoxetine (CYMBALTA) 60 mg, oral, Daily    ezetimibe (ZETIA) 10 mg, oral, Daily    fluticasone (Flonase) 50 mcg/actuation nasal spray Administer 1 spray into each nostril once daily as needed for allergies.    Jardiance 10 mg, oral, Daily    lancets misc Use to check blood sugar once daily    losartan (COZAAR) 25 mg, oral, Daily    metFORMIN (Glucophage) 500 mg tablet Take 1 tablet (500 mg) by mouth 2 times a day, with morning and evening meals    metoprolol succinate XL (TOPROL-XL) 50 mg, oral, Daily, Do not crush or chew.    spironolactone (ALDACTONE) 12.5 mg, oral, Daily    traZODone (Desyrel) 50 mg tablet Take 1 tablet (50 mg) by mouth as needed at bedtime.    Vitamin D3 50 mcg (2,000 unit) capsule Take 1 capsule (50 mcg) by mouth early in the morning.. With a meal        Recent Lab Results:    CBC:   Lab Results   Component Value Date    WBC 7.5 06/13/2024    RBC 3.42 (L) 06/13/2024    HGB 11.1 (L) 06/13/2024    HCT 35.1 (L) 06/13/2024     06/13/2024        CMP:    Lab Results   Component Value Date     06/13/2024    K 4.3 06/13/2024     (H) 06/13/2024    CO2 24 06/13/2024    BUN 19 06/13/2024    CREATININE 1.06 (H) 06/13/2024    GLUCOSE 90 06/13/2024    CALCIUM 8.6 06/13/2024       Magnesium:    Lab Results   Component Value Date    MG 2.19 06/13/2024        Lipid Profile:    Lab Results   Component Value Date    TRIG 309 (H) 06/11/2024    HDL 45.5 06/11/2024    LDLCALC 70 06/11/2024       TSH:    Lab Results   Component Value Date    TSH 2.61 06/11/2024       BNP:   Lab Results   Component Value Date     (H) 06/10/2024        PT/INR:    Lab Results   Component Value Date    PROTIME 11.7 06/10/2024    INR 1.0 06/10/2024       HgBA1c:    Lab Results   Component Value Date    HGBA1C 6.0 11/18/2020       BMP:  Lab Results   Component Value Date     06/13/2024     06/12/2024     06/12/2024    K 4.3 06/13/2024    K 4.1 06/12/2024    K 3.9 06/12/2024     (H) 06/13/2024     (H) 06/12/2024     06/12/2024    CO2 24 06/13/2024    CO2 24 06/12/2024    CO2 26 06/12/2024    BUN 19 06/13/2024    BUN 15 06/12/2024    BUN 18 06/12/2024    CREATININE 1.06 (H) 06/13/2024    CREATININE 1.03 06/12/2024    CREATININE 1.07 (H) 06/12/2024       CBC:  Lab Results   Component Value Date    WBC 7.5 06/13/2024    WBC 10.6 06/12/2024    WBC 7.3 06/11/2024    RBC 3.42 (L) 06/13/2024    RBC 3.49 (L) 06/12/2024    RBC 4.17 06/11/2024    HGB 11.1 (L) 06/13/2024    HGB 11.2 (L) 06/12/2024    HGB 13.5 06/11/2024    HCT 35.1 (L) 06/13/2024    HCT 34.6 (L) 06/12/2024    HCT 42.8 06/11/2024     (H) 06/13/2024    MCV 99 06/12/2024     (H) 06/11/2024    MCH 32.5 06/13/2024    MCH 32.1 06/12/2024    MCH 32.4 06/11/2024    MCHC 31.6 (L) 06/13/2024    MCHC 32.4 06/12/2024    MCHC 31.5 (L) 06/11/2024    RDW 14.1 06/13/2024    RDW 14.1 06/12/2024    RDW 13.9 06/11/2024     06/13/2024     06/12/2024     06/11/2024       Cardiac Enzymes:    Lab Results   Component Value Date    TROPHS 8 06/10/2024    TROPHS 8 06/10/2024    TROPHS 7 02/06/2024       Hepatic Function Panel:    Lab Results   Component Value Date    ALKPHOS 40 06/13/2024    ALT 40 06/13/2024    AST 34 06/13/2024    PROT 6.3 (L) 06/13/2024    BILITOT 1.0 06/13/2024     BILIDIR 0.1 11/21/2021         REVIEW OF SYSTEMS  Review of Systems   Constitutional: Negative.   Cardiovascular: Negative.    Respiratory: Negative.     Neurological: Negative.    All other systems reviewed and are negative.      VITALS  Vitals:    10/07/24 0912   BP: 138/88   Pulse: 76     Wt Readings from Last 4 Encounters:   10/07/24 105 kg (232 lb 6.4 oz)   08/29/24 107 kg (235 lb)   07/03/24 107 kg (235 lb)   07/03/24 107 kg (235 lb)       PHYSICAL EXAM  Constitutional:       Appearance: Healthy appearance. Not in distress.   Eyes:      Conjunctiva/sclera: Conjunctivae normal.      Pupils: Pupils are equal, round, and reactive to light.   Neck:      Vascular: No JVR. JVD normal.   Pulmonary:      Effort: Pulmonary effort is normal.      Breath sounds: Normal breath sounds. No wheezing. No rhonchi. No rales.   Chest:      Chest wall: Not tender to palpatation.   Cardiovascular:      PMI at left midclavicular line. Normal rate. Regular rhythm. Frequent PVC's Normal S1. Normal S2.       Murmurs: There is no murmur.      No gallop.  No click. No rub.   Pulses:     Intact distal pulses.   Edema:     Peripheral edema absent.   Abdominal:      Tenderness: There is no abdominal tenderness.   Musculoskeletal: Normal range of motion.         General: No tenderness.      Cervical back: Normal range of motion. Skin:     General: Skin is warm and dry.   Neurological:      General: No focal deficit present.      Mental Status: Alert and oriented to person, place and time.

## 2025-04-14 ENCOUNTER — APPOINTMENT (OUTPATIENT)
Dept: CARDIOLOGY | Facility: CLINIC | Age: 70
End: 2025-04-14
Payer: MEDICARE

## 2025-04-14 VITALS
HEART RATE: 84 BPM | WEIGHT: 228.4 LBS | BODY MASS INDEX: 35.85 KG/M2 | HEIGHT: 67 IN | DIASTOLIC BLOOD PRESSURE: 90 MMHG | SYSTOLIC BLOOD PRESSURE: 120 MMHG

## 2025-04-14 DIAGNOSIS — I25.5 ISCHEMIC CARDIOMYOPATHY: ICD-10-CM

## 2025-04-14 DIAGNOSIS — I10 ESSENTIAL HYPERTENSION: ICD-10-CM

## 2025-04-14 DIAGNOSIS — I50.20 HFREF (HEART FAILURE WITH REDUCED EJECTION FRACTION): ICD-10-CM

## 2025-04-14 DIAGNOSIS — R55 SYNCOPE AND COLLAPSE: ICD-10-CM

## 2025-04-14 DIAGNOSIS — E78.2 MIXED HYPERLIPIDEMIA: ICD-10-CM

## 2025-04-14 DIAGNOSIS — Z78.9 NEVER SMOKED TOBACCO: ICD-10-CM

## 2025-04-14 PROCEDURE — 3080F DIAST BP >= 90 MM HG: CPT | Performed by: INTERNAL MEDICINE

## 2025-04-14 PROCEDURE — 99214 OFFICE O/P EST MOD 30 MIN: CPT | Performed by: INTERNAL MEDICINE

## 2025-04-14 PROCEDURE — 3074F SYST BP LT 130 MM HG: CPT | Performed by: INTERNAL MEDICINE

## 2025-04-14 PROCEDURE — 1036F TOBACCO NON-USER: CPT | Performed by: INTERNAL MEDICINE

## 2025-04-14 PROCEDURE — 1159F MED LIST DOCD IN RCRD: CPT | Performed by: INTERNAL MEDICINE

## 2025-04-14 PROCEDURE — 3008F BODY MASS INDEX DOCD: CPT | Performed by: INTERNAL MEDICINE

## 2025-04-14 RX ORDER — PANTOPRAZOLE SODIUM 20 MG/1
1 TABLET, DELAYED RELEASE ORAL
COMMUNITY
Start: 2024-10-18

## 2025-04-14 NOTE — PATIENT INSTRUCTIONS
Continue same medications/treatment.  Patient educated on proper medication use.  Patient educated on risk factor modification.  Please bring any lab results from other providers/physicians to your next appointment.    Please bring all medicines, vitamins, and herbal supplements with you when you come to the office.    Prescriptions will not be filled unless you are compliant with your follow up appointments or have a follow up appointment scheduled as per instruction of your physician. Refills should be requested at the time of your visit.    Follow up in 6 months  Labs to be done today    MARY CARMEN, ANGELA ELIAS RN, AM SCRIBING FOR AND IN THE PRESENCE OF DEEPA REDDING MD

## 2025-04-15 LAB
CHOLEST SERPL-MCNC: 238 MG/DL
CHOLEST/HDLC SERPL: 3.8 (CALC)
HDLC SERPL-MCNC: 63 MG/DL
LDLC SERPL CALC-MCNC: 160 MG/DL (CALC)
NONHDLC SERPL-MCNC: 175 MG/DL (CALC)
TRIGL SERPL-MCNC: 60 MG/DL

## 2025-10-13 ENCOUNTER — APPOINTMENT (OUTPATIENT)
Dept: CARDIOLOGY | Facility: CLINIC | Age: 70
End: 2025-10-13
Payer: MEDICARE

## 2025-10-20 ENCOUNTER — APPOINTMENT (OUTPATIENT)
Dept: CARDIOLOGY | Facility: CLINIC | Age: 70
End: 2025-10-20
Payer: MEDICARE

## (undated) DEVICE — CATHETER, OPTITORQUE, 5FR, JACKY, 3.5/ 2H/110CM, CURVED

## (undated) DEVICE — BAND, VASCULAR, RADIAL HEMOSTAT, EXTRA-LONG 29CM

## (undated) DEVICE — TUBING, MANIFOLD, LOW PRESSURE

## (undated) DEVICE — SHEATH, GLIDESHEATH, SLENDER, 6FR 10CM